# Patient Record
Sex: FEMALE | Race: WHITE | HISPANIC OR LATINO | Employment: FULL TIME | URBAN - METROPOLITAN AREA
[De-identification: names, ages, dates, MRNs, and addresses within clinical notes are randomized per-mention and may not be internally consistent; named-entity substitution may affect disease eponyms.]

---

## 2018-01-12 ENCOUNTER — OFFICE VISIT (OUTPATIENT)
Dept: URGENT CARE | Facility: CLINIC | Age: 53
End: 2018-01-12
Payer: COMMERCIAL

## 2018-01-12 VITALS
HEIGHT: 66 IN | TEMPERATURE: 98 F | OXYGEN SATURATION: 98 % | BODY MASS INDEX: 24.27 KG/M2 | RESPIRATION RATE: 18 BRPM | HEART RATE: 82 BPM | SYSTOLIC BLOOD PRESSURE: 134 MMHG | DIASTOLIC BLOOD PRESSURE: 64 MMHG | WEIGHT: 151 LBS

## 2018-01-12 DIAGNOSIS — R68.89 FLU-LIKE SYMPTOMS: ICD-10-CM

## 2018-01-12 DIAGNOSIS — J10.1 INFLUENZA A: Primary | ICD-10-CM

## 2018-01-12 LAB
CTP QC/QA: YES
FLUAV AG NPH QL: POSITIVE
FLUBV AG NPH QL: NEGATIVE

## 2018-01-12 PROCEDURE — 87804 INFLUENZA ASSAY W/OPTIC: CPT | Mod: 59,QW,S$GLB, | Performed by: NURSE PRACTITIONER

## 2018-01-12 PROCEDURE — 99203 OFFICE O/P NEW LOW 30 MIN: CPT | Mod: S$GLB,,, | Performed by: NURSE PRACTITIONER

## 2018-01-12 RX ORDER — ONDANSETRON 4 MG/1
4 TABLET, ORALLY DISINTEGRATING ORAL EVERY 8 HOURS PRN
Qty: 9 TABLET | Refills: 0 | Status: SHIPPED | OUTPATIENT
Start: 2018-01-12 | End: 2018-01-15

## 2018-01-12 RX ORDER — OSELTAMIVIR PHOSPHATE 75 MG/1
75 CAPSULE ORAL 2 TIMES DAILY
Qty: 10 CAPSULE | Refills: 0 | Status: SHIPPED | OUTPATIENT
Start: 2018-01-12 | End: 2018-01-17

## 2018-01-12 NOTE — LETTER
January 12, 2018      [facility]  [address] [place] [address]  Phone: [phone]  Fax: [phone]       Patient: Halle Spence   YOB: 1965  Date of Visit: 01/12/2018    To Whom It May Concern:    Gabriel Spence  was at [facility] on 01/12/2018. She may return to work/school on 1/16/18 with no restrictions. If you have any questions or concerns, or if I can be of further assistance, please do not hesitate to contact me.    Sincerely,    Halle English NP

## 2018-01-12 NOTE — PATIENT INSTRUCTIONS
Please drink plenty of fluids.  Please get plenty of rest.  Please return here or go to the Emergency Department for any concerns or worsening of condition.  Tamiflu prescription has been discussed and if prescribed, please take to completion unless you cannot tolerate the side effects.   If you were prescribed a narcotic medication, do not drive or operate heavy equipment or machinery while taking these medications.  If you were given a steroid shot in the clinic and have also been given a prescription for a steroid such as Prednisone or a Medrol Dose Pack, please begin taking them tomorrow.  If you do not have Hypertension or any history of palpitations, it is ok to take over the counter Sudafed or Mucinex D or Allegra-D or Claritin-D or Zyrtec-D.  If you do take one of the above, it is ok to combine that with plain over the counter Mucinex or Allegra or Claritin or Zyrtec.  If for example you are taking Zyrtec -D, you can combine that with Mucinex, but not Mucinex-D.  If you are taking Mucinex-D, you can combine that with plain Allegra or Claritin or Zyrtec.   If you do have Hypertension or palpitations, it is safe to take Coricidin HBP for relief of sinus symptoms.  If not allergic, please take over the counter Tylenol (Acetaminophen) and/or Motrin (Ibuprofen) as directed for control of pain and/or fever.  Please follow up with your primary care doctor or specialist as needed.    If you  smoke, please stop smoking.  The Flu (Influenza)     The virus that causes the flu spreads through the air in droplets when someone who has the flu coughs, sneezes, laughs, or talks.   The flu (influenza) is an infection that affects your respiratory tract. This tract is made up of your mouth, nose, and lungs, and the passages between them. Unlike a cold, the flu can make you very ill. And it can lead to pneumonia, a serious lung infection. The flu can have serious complications and even cause death.  Who is at risk for the  flu?  Anyone can get the flu. But you are more likely to become infected if you:  · Have a weakened immune system  · Work in a healthcare setting where you may be exposed to flu germs  · Live or work with someone who has the flu  · Havent had an annual flu shot  How does the flu spread?  The flu is caused by a virus. The virus spreads through the air in droplets when someone who has the flu coughs, sneezes, laughs, or talks. You can become infected when you inhale these viruses directly. You can also become infected when you touch a surface on which the droplets have landed and then transfer the germs to your eyes, nose, or mouth. Touching used tissues, or sharing utensils, drinking glasses, or a toothbrush from an infected person can expose you to flu viruses, too.  What are the symptoms of the flu?  Flu symptoms tend to come on quickly and may last a few days to a few weeks. They include:  · Fever usually higher than 100.4°F  (38°C) and chills  · Sore throat and headache  · Dry cough  · Runny nose  · Tiredness and weakness  · Muscle aches  Who is at risk for flu complications?  For some people, the flu can be very serious. The risk for complications is greater for:  · Children younger than age 5  · Adults ages 65 and older  · People with a chronic illness such as diabetes or heart, kidney, or lung disease  · People who live in a nursing home or long-term care facility   How is the flu treated?  The flu usually gets better after 7 days or so. In some cases, your healthcare provider may prescribe an antiviral medicine. This may help you get well a little sooner. For the medicine to help, you need to take it as soon as possible (ideally within 48 hours) after your symptoms start. If you develop pneumonia or other serious illness, you may need to stay in the hospital.  Easing flu symptoms  · Drink lots of fluids such as water, juice, and warm soup. A good rule is to drink enough so that you urinate your normal  amount.  · Get plenty of rest.  · Ask your healthcare provider what to take for fever and pain.  · Call your provider if your fever is 100.4°F (38°C) or higher, or you become dizzy, lightheaded, or short of breath.  Taking steps to protect others  · Wash your hands often, especially after coughing or sneezing. Or clean your hands with an alcohol-based hand  containing at least 60% alcohol.  · Cough or sneeze into a tissue. Then throw the tissue away and wash your hands. If you dont have a tissue, cough and sneeze into your elbow.  · Stay home until at least 24 hours after you no longer have a fever or chills. Be sure the fever isnt being hidden by fever-reducing medicine.  · Dont share food, utensils, drinking glasses, or a toothbrush with others.  · Ask your healthcare provider if others in your household should get antiviral medicine to help them avoid infection.  How can the flu be prevented?  · One of the best ways to avoid the flu is to get a flu vaccine each year. The virus that causes the flu changes from year to year. For that reason, healthcare providers recommend getting the flu vaccine each year, as soon as it's available in your area. The vaccine is given as a shot. Your healthcare provider can tell you which vaccine is right for you. A nasal spray is also available but is not recommended for the 4990-5058 flu season. The CDC says this is because the nasal spray did not seem to protect against the flu over the last several flu seasons. In the past, it was meant for people ages 2 to 49.  · Wash your hands often. Frequent handwashing is a proven way to help prevent infection.  · Carry an alcohol-based hand gel containing at least 60% alcohol. Use it when you can't use soap and water. Then wash your hands as soon as you can.  · Avoid touching your eyes, nose, and mouth.  · At home and work, clean phones, computer keyboards, and toys often with disinfectant wipes.  · If possible, avoid close  contact with others who have the flu or symptoms of the flu.  Handwashing tips  Handwashing is one of the best ways to prevent many common infections. If you are caring for or visiting someone with the flu, wash your hands each time you enter and leave the room. Follow these steps:  · Use warm water and plenty of soap. Rub your hands together well.  · Clean the whole hand, including under your nails, between your fingers, and up the wrists.  · Wash for at least 15 seconds.  · Rinse, letting the water run down your fingers, not up your wrists.  · Dry your hands well. Use a paper towel to turn off the faucet and open the door.  Using alcohol-based hand   Alcohol-based hand  are also a good choice. Use them when you can't use soap and water. Follow these steps:  · Squeeze about a tablespoon of gel into the palm of one hand.  · Rub your hands together briskly, cleaning the backs of your hands, the palms, between your fingers, and up the wrists.  · Rub until the gel is gone and your hands are completely dry.  Preventing the flu in healthcare settings  The flu is a special concern for people in hospitals and long-term care facilities. To help prevent the spread of flu, many hospitals and nursing homes take these steps:  · Healthcare providers wash their hands or use an alcohol-based hand  before and after treating each patient.  · People with the flu have private rooms and bathrooms or share a room with someone with the same infection.  · People who are at high risk for the flu but don't have it are encouraged to get the flu and pneumonia vaccines.  · All healthcare workers are encouraged or required to get flu shots.   Date Last Reviewed: 12/1/2016  © 6263-1929 [employer]. [address] [place] [address], [place] [address]. All rights reserved. This information is not intended as a substitute for professional medical care. Always follow your healthcare professional's instructions.

## 2018-01-12 NOTE — PROGRESS NOTES
"Subjective:       Patient ID: Halle Spence is a 52 y.o. female.    Vitals:  height is 5' 6" (1.676 m) and weight is 68.5 kg (151 lb). Her temperature is 98 °F (36.7 °C). Her blood pressure is 134/64 and her pulse is 82. Her respiration is 18 and oxygen saturation is 98%.     Chief Complaint: Sinus Problem    PATIENT REPORTS TO CLINIC WITH C/O VOMITING AND DIARRHEA ON  1/10/18. SHE STATES THAT THE VOMITING SUBSIDED THE NEXT DAY AND DIARRHEA STOPPED TODAY. SHE STATES THAT SHE TRIED TO GO TO WORK TODAY BUT DID NOT FEEL WELL. SHE HAS STARTED HAVING SINUS CONGESTION THIS MORNING. SHE STILL HAS SOME NAUSEA.       Sinus Problem   This is a new problem. Episode onset: 3 Days. The problem has been gradually worsening since onset. There has been no fever. Her pain is at a severity of 4/10. The pain is mild. Associated symptoms include chills and congestion. Pertinent negatives include no coughing, ear pain, headaches, hoarse voice, shortness of breath or sore throat. Past treatments include nothing.     Review of Systems   Constitution: Positive for chills and malaise/fatigue. Negative for fever.   HENT: Positive for congestion. Negative for ear pain, hoarse voice and sore throat.    Eyes: Negative for discharge and redness.   Cardiovascular: Negative for chest pain, dyspnea on exertion and leg swelling.   Respiratory: Negative for cough, shortness of breath, sputum production and wheezing.    Musculoskeletal: Negative for myalgias.   Gastrointestinal: Positive for diarrhea, nausea and vomiting. Negative for abdominal pain.   Neurological: Negative for headaches.       Objective:      Physical Exam   Constitutional: She is oriented to person, place, and time. She appears well-developed and well-nourished. She is cooperative.  Non-toxic appearance. She does not appear ill. No distress.   HENT:   Head: Normocephalic and atraumatic.   Right Ear: Hearing, tympanic membrane, external ear and ear canal normal.   Left Ear: " Hearing, tympanic membrane, external ear and ear canal normal.   Nose: Nose normal. No mucosal edema, rhinorrhea or nasal deformity. No epistaxis. Right sinus exhibits no maxillary sinus tenderness and no frontal sinus tenderness. Left sinus exhibits no maxillary sinus tenderness and no frontal sinus tenderness.   Mouth/Throat: Uvula is midline, oropharynx is clear and moist and mucous membranes are normal. No trismus in the jaw. Normal dentition. No uvula swelling. No posterior oropharyngeal erythema.   Eyes: Conjunctivae and lids are normal. No scleral icterus.   Sclera clear bilat   Neck: Trachea normal, full passive range of motion without pain and phonation normal. Neck supple.   Cardiovascular: Normal rate, regular rhythm, normal heart sounds, intact distal pulses and normal pulses.    Pulmonary/Chest: Effort normal and breath sounds normal. No respiratory distress.   Abdominal: Soft. Normal appearance and bowel sounds are normal. She exhibits no distension. There is no tenderness.   Musculoskeletal: Normal range of motion. She exhibits no edema or deformity.   Neurological: She is alert and oriented to person, place, and time. She exhibits normal muscle tone. Coordination normal.   Skin: Skin is warm, dry and intact. She is not diaphoretic. No pallor.   Psychiatric: She has a normal mood and affect. Her speech is normal and behavior is normal. Judgment and thought content normal. Cognition and memory are normal.   Nursing note and vitals reviewed.      Office Visit on 01/12/2018   Component Date Value Ref Range Status    Rapid Influenza A Ag 01/12/2018 Positive* Negative Final    Rapid Influenza B Ag 01/12/2018 Negative  Negative Final     Acceptable 01/12/2018 Yes   Final     Assessment:       1. Influenza A    2. Flu-like symptoms        Plan:         Influenza A  -     oseltamivir (TAMIFLU) 75 MG capsule; Take 1 capsule (75 mg total) by mouth 2 (two) times daily.  Dispense: 10 capsule;  Refill: 0  -     ondansetron (ZOFRAN-ODT) 4 MG TbDL; Take 1 tablet (4 mg total) by mouth every 8 (eight) hours as needed (NAUSEA).  Dispense: 9 tablet; Refill: 0    Flu-like symptoms  -     POCT Influenza A/B      Patient Instructions     Please drink plenty of fluids.  Please get plenty of rest.  Please return here or go to the Emergency Department for any concerns or worsening of condition.  Tamiflu prescription has been discussed and if prescribed, please take to completion unless you cannot tolerate the side effects.   If you were prescribed a narcotic medication, do not drive or operate heavy equipment or machinery while taking these medications.  If you were given a steroid shot in the clinic and have also been given a prescription for a steroid such as Prednisone or a Medrol Dose Pack, please begin taking them tomorrow.  If you do not have Hypertension or any history of palpitations, it is ok to take over the counter Sudafed or Mucinex D or Allegra-D or Claritin-D or Zyrtec-D.  If you do take one of the above, it is ok to combine that with plain over the counter Mucinex or Allegra or Claritin or Zyrtec.  If for example you are taking Zyrtec -D, you can combine that with Mucinex, but not Mucinex-D.  If you are taking Mucinex-D, you can combine that with plain Allegra or Claritin or Zyrtec.   If you do have Hypertension or palpitations, it is safe to take Coricidin HBP for relief of sinus symptoms.  If not allergic, please take over the counter Tylenol (Acetaminophen) and/or Motrin (Ibuprofen) as directed for control of pain and/or fever.  Please follow up with your primary care doctor or specialist as needed.    If you  smoke, please stop smoking.  The Flu (Influenza)     The virus that causes the flu spreads through the air in droplets when someone who has the flu coughs, sneezes, laughs, or talks.   The flu (influenza) is an infection that affects your respiratory tract. This tract is made up of your mouth,  nose, and lungs, and the passages between them. Unlike a cold, the flu can make you very ill. And it can lead to pneumonia, a serious lung infection. The flu can have serious complications and even cause death.  Who is at risk for the flu?  Anyone can get the flu. But you are more likely to become infected if you:  · Have a weakened immune system  · Work in a healthcare setting where you may be exposed to flu germs  · Live or work with someone who has the flu  · Havent had an annual flu shot  How does the flu spread?  The flu is caused by a virus. The virus spreads through the air in droplets when someone who has the flu coughs, sneezes, laughs, or talks. You can become infected when you inhale these viruses directly. You can also become infected when you touch a surface on which the droplets have landed and then transfer the germs to your eyes, nose, or mouth. Touching used tissues, or sharing utensils, drinking glasses, or a toothbrush from an infected person can expose you to flu viruses, too.  What are the symptoms of the flu?  Flu symptoms tend to come on quickly and may last a few days to a few weeks. They include:  · Fever usually higher than 100.4°F  (38°C) and chills  · Sore throat and headache  · Dry cough  · Runny nose  · Tiredness and weakness  · Muscle aches  Who is at risk for flu complications?  For some people, the flu can be very serious. The risk for complications is greater for:  · Children younger than age 5  · Adults ages 65 and older  · People with a chronic illness such as diabetes or heart, kidney, or lung disease  · People who live in a nursing home or long-term care facility   How is the flu treated?  The flu usually gets better after 7 days or so. In some cases, your healthcare provider may prescribe an antiviral medicine. This may help you get well a little sooner. For the medicine to help, you need to take it as soon as possible (ideally within 48 hours) after your symptoms start. If  you develop pneumonia or other serious illness, you may need to stay in the hospital.  Easing flu symptoms  · Drink lots of fluids such as water, juice, and warm soup. A good rule is to drink enough so that you urinate your normal amount.  · Get plenty of rest.  · Ask your healthcare provider what to take for fever and pain.  · Call your provider if your fever is 100.4°F (38°C) or higher, or you become dizzy, lightheaded, or short of breath.  Taking steps to protect others  · Wash your hands often, especially after coughing or sneezing. Or clean your hands with an alcohol-based hand  containing at least 60% alcohol.  · Cough or sneeze into a tissue. Then throw the tissue away and wash your hands. If you dont have a tissue, cough and sneeze into your elbow.  · Stay home until at least 24 hours after you no longer have a fever or chills. Be sure the fever isnt being hidden by fever-reducing medicine.  · Dont share food, utensils, drinking glasses, or a toothbrush with others.  · Ask your healthcare provider if others in your household should get antiviral medicine to help them avoid infection.  How can the flu be prevented?  · One of the best ways to avoid the flu is to get a flu vaccine each year. The virus that causes the flu changes from year to year. For that reason, healthcare providers recommend getting the flu vaccine each year, as soon as it's available in your area. The vaccine is given as a shot. Your healthcare provider can tell you which vaccine is right for you. A nasal spray is also available but is not recommended for the 5307-9942 flu season. The CDC says this is because the nasal spray did not seem to protect against the flu over the last several flu seasons. In the past, it was meant for people ages 2 to 49.  · Wash your hands often. Frequent handwashing is a proven way to help prevent infection.  · Carry an alcohol-based hand gel containing at least 60% alcohol. Use it when you can't use  soap and water. Then wash your hands as soon as you can.  · Avoid touching your eyes, nose, and mouth.  · At home and work, clean phones, computer keyboards, and toys often with disinfectant wipes.  · If possible, avoid close contact with others who have the flu or symptoms of the flu.  Handwashing tips  Handwashing is one of the best ways to prevent many common infections. If you are caring for or visiting someone with the flu, wash your hands each time you enter and leave the room. Follow these steps:  · Use warm water and plenty of soap. Rub your hands together well.  · Clean the whole hand, including under your nails, between your fingers, and up the wrists.  · Wash for at least 15 seconds.  · Rinse, letting the water run down your fingers, not up your wrists.  · Dry your hands well. Use a paper towel to turn off the faucet and open the door.  Using alcohol-based hand   Alcohol-based hand  are also a good choice. Use them when you can't use soap and water. Follow these steps:  · Squeeze about a tablespoon of gel into the palm of one hand.  · Rub your hands together briskly, cleaning the backs of your hands, the palms, between your fingers, and up the wrists.  · Rub until the gel is gone and your hands are completely dry.  Preventing the flu in healthcare settings  The flu is a special concern for people in hospitals and long-term care facilities. To help prevent the spread of flu, many hospitals and nursing homes take these steps:  · Healthcare providers wash their hands or use an alcohol-based hand  before and after treating each patient.  · People with the flu have private rooms and bathrooms or share a room with someone with the same infection.  · People who are at high risk for the flu but don't have it are encouraged to get the flu and pneumonia vaccines.  · All healthcare workers are encouraged or required to get flu shots.   Date Last Reviewed: 12/1/2016  © 8963-6743 [email or web address] [address] [place] [address], [place] [address]. All rights reserved. This information is not intended as a substitute for professional medical care. Always follow your healthcare professional's instructions.

## 2019-02-15 ENCOUNTER — OFFICE VISIT (OUTPATIENT)
Dept: URGENT CARE | Facility: CLINIC | Age: 54
End: 2019-02-15
Payer: COMMERCIAL

## 2019-02-15 VITALS
WEIGHT: 151 LBS | DIASTOLIC BLOOD PRESSURE: 67 MMHG | HEART RATE: 70 BPM | RESPIRATION RATE: 18 BRPM | OXYGEN SATURATION: 97 % | BODY MASS INDEX: 25.16 KG/M2 | SYSTOLIC BLOOD PRESSURE: 134 MMHG | HEIGHT: 65 IN

## 2019-02-15 DIAGNOSIS — R30.0 DYSURIA: Primary | ICD-10-CM

## 2019-02-15 DIAGNOSIS — R10.84 GENERALIZED ABDOMINAL PAIN: ICD-10-CM

## 2019-02-15 LAB
BILIRUB UR QL STRIP: NEGATIVE
GLUCOSE UR QL STRIP: POSITIVE
KETONES UR QL STRIP: NEGATIVE
LEUKOCYTE ESTERASE UR QL STRIP: NEGATIVE
PH, POC UA: 5.5 (ref 5–8)
POC BLOOD, URINE: POSITIVE
POC NITRATES, URINE: NEGATIVE
PROT UR QL STRIP: NEGATIVE
SP GR UR STRIP: 1.02 (ref 1–1.03)
UROBILINOGEN UR STRIP-ACNC: NORMAL (ref 0.1–1.1)

## 2019-02-15 PROCEDURE — 99214 PR OFFICE/OUTPT VISIT, EST, LEVL IV, 30-39 MIN: ICD-10-PCS | Mod: 25,S$GLB,, | Performed by: NURSE PRACTITIONER

## 2019-02-15 PROCEDURE — 3008F PR BODY MASS INDEX (BMI) DOCUMENTED: ICD-10-PCS | Mod: CPTII,S$GLB,, | Performed by: NURSE PRACTITIONER

## 2019-02-15 PROCEDURE — 81003 POCT URINALYSIS, DIPSTICK, AUTOMATED, W/O SCOPE: ICD-10-PCS | Mod: QW,S$GLB,, | Performed by: NURSE PRACTITIONER

## 2019-02-15 PROCEDURE — 3008F BODY MASS INDEX DOCD: CPT | Mod: CPTII,S$GLB,, | Performed by: NURSE PRACTITIONER

## 2019-02-15 PROCEDURE — 99214 OFFICE O/P EST MOD 30 MIN: CPT | Mod: 25,S$GLB,, | Performed by: NURSE PRACTITIONER

## 2019-02-15 PROCEDURE — 81003 URINALYSIS AUTO W/O SCOPE: CPT | Mod: QW,S$GLB,, | Performed by: NURSE PRACTITIONER

## 2019-02-15 RX ORDER — CANAGLIFLOZIN 100 MG/1
TABLET, FILM COATED ORAL
Refills: 10 | Status: ON HOLD | COMMUNITY
Start: 2019-01-24 | End: 2020-04-02 | Stop reason: HOSPADM

## 2019-02-15 RX ORDER — NITROFURANTOIN 25; 75 MG/1; MG/1
100 CAPSULE ORAL 2 TIMES DAILY
Qty: 14 CAPSULE | Refills: 0 | Status: SHIPPED | OUTPATIENT
Start: 2019-02-15 | End: 2019-02-22

## 2019-02-15 RX ORDER — INSULIN LISPRO 100 [IU]/ML
INJECTION, SOLUTION INTRAVENOUS; SUBCUTANEOUS
Refills: 4 | COMMUNITY
Start: 2019-01-28 | End: 2022-12-05

## 2019-02-15 RX ORDER — LEVOTHYROXINE SODIUM 88 UG/1
TABLET ORAL
Refills: 9 | COMMUNITY
Start: 2019-01-28 | End: 2019-11-25 | Stop reason: SDUPTHER

## 2019-02-15 NOTE — PROGRESS NOTES
"Subjective:       Patient ID: Halle Spence is a 53 y.o. female.    Vitals:  height is 5' 5" (1.651 m) and weight is 68.5 kg (151 lb). Her blood pressure is 134/67 and her pulse is 70. Her respiration is 18 and oxygen saturation is 97%.     Chief Complaint: Urinary Tract Infection    Urinary Tract Infection    This is a new problem. Episode onset: 3 days. The problem has been unchanged. Quality: just pressure  The pain is at a severity of 5/10. The pain is moderate. There has been no fever. She is sexually active. There is no history of pyelonephritis. Pertinent negatives include no chills, frequency, hematuria, nausea, urgency, vomiting or rash. Treatments tried: ibuprofen. The treatment provided no relief.       Constitution: Negative for chills and fever.   Neck: Negative for painful lymph nodes.   Gastrointestinal: Positive for abdominal pain. Negative for nausea and vomiting.   Genitourinary: Negative for dysuria, frequency, urgency, urine decreased, hematuria, history of kidney stones, painful menstruation, irregular menstruation, missed menses, heavy menstrual bleeding, ovarian cysts, genital trauma, vaginal pain, vaginal discharge, vaginal bleeding, vaginal odor, painful intercourse, genital sore, painful ejaculation and pelvic pain.   Musculoskeletal: Positive for back pain.   Skin: Negative for rash and lesion.   Hematologic/Lymphatic: Negative for swollen lymph nodes.       Objective:      Physical Exam   Constitutional: She is oriented to person, place, and time. She appears well-developed and well-nourished. She is cooperative.  Non-toxic appearance. She does not appear ill. No distress.   HENT:   Head: Normocephalic and atraumatic.   Right Ear: Hearing, tympanic membrane, external ear and ear canal normal.   Left Ear: Hearing, tympanic membrane, external ear and ear canal normal.   Nose: Nose normal. No mucosal edema, rhinorrhea or nasal deformity. No epistaxis. Right sinus exhibits no maxillary " sinus tenderness and no frontal sinus tenderness. Left sinus exhibits no maxillary sinus tenderness and no frontal sinus tenderness.   Mouth/Throat: Uvula is midline, oropharynx is clear and moist and mucous membranes are normal. No trismus in the jaw. Normal dentition. No uvula swelling. No posterior oropharyngeal erythema.   Eyes: Conjunctivae and lids are normal. Right eye exhibits no discharge. Left eye exhibits no discharge. No scleral icterus.   Sclera clear bilat   Neck: Trachea normal, normal range of motion, full passive range of motion without pain and phonation normal. Neck supple.   Cardiovascular: Normal rate, regular rhythm, normal heart sounds, intact distal pulses and normal pulses.   Pulmonary/Chest: Effort normal and breath sounds normal. No respiratory distress.   Abdominal: Soft. Normal appearance and bowel sounds are normal. She exhibits no distension, no pulsatile midline mass and no mass. There is tenderness in the suprapubic area.       Musculoskeletal: Normal range of motion. She exhibits no edema or deformity.   Neurological: She is alert and oriented to person, place, and time. She exhibits normal muscle tone. Coordination normal.   Skin: Skin is warm, dry and intact. She is not diaphoretic. No pallor.   Psychiatric: She has a normal mood and affect. Her speech is normal and behavior is normal. Judgment and thought content normal. Cognition and memory are normal.   Nursing note and vitals reviewed.      Results for orders placed or performed in visit on 02/15/19   POCT Urinalysis, Dipstick, Automated, W/O Scope   Result Value Ref Range    POC Blood, Urine Positive (A) Negative    POC Bilirubin, Urine Negative Negative    POC Urobilinogen, Urine normal 0.1 - 1.1    POC Ketones, Urine Negative Negative    POC Protein, Urine Negative Negative    POC Nitrates, Urine Negative Negative    POC Glucose, Urine Positive (A) Negative    pH, UA 5.5 5 - 8    POC Specific Gravity, Urine 1.020 1.003 -  1.029    POC Leukocytes, Urine Negative Negative     Assessment:       1. Dysuria    2. Generalized abdominal pain        Plan:       Patient urine negative for leukocytes and nitrates.  Urine culture sent off.  Patient treated symptomatically with Macrobid.  Dysuria  -     Culture, Urine    Generalized abdominal pain  -     POCT Urinalysis, Dipstick, Automated, W/O Scope      Patient Instructions     Patient given antibiotics treating symptomatically. Patient educated and will come back approximately 5 days.    Please return here or go to the Emergency Department for any concerns or worsening of condition.  If you were prescribed antibiotics, please take them to completion.  Please follow up with your primary care doctor or specialist as needed.  Please drink plenty of fluids.  Please get plenty of rest.  I  Push fluids aggressively to improve symptoms and wash through the infection.  Cranberry juice can help relieve symptoms  If you  smoke, please stop smoking.    Please follow up with your primary care doctor or specialist as needed.    Adolfo Osborne MD  [phone]        Dysuria     Painful urination (dysuria) is often caused by a problem in the urinary tract.   Dysuria is pain felt during urination. It is often described as a burning. Learn more about this problem and how it can be treated.  What causes dysuria?  Possible causes include:  · Infection with a bacteria or virus such as a urinary tract infection (UTI or a sexually transmitted infection (STI)  · Sensitivity or allergy to chemicals such as those found in lotions and other products  · Prostate or bladder problems  · Radiation therapy to the pelvic area  How is dysuria diagnosed?  Your healthcare provider will examine you. He or she will ask about your symptoms and health. After talking with you and doing a physical exam, your healthcare provider may know what is causing your dysuria. He or she will usually request  a sample of your urine. Tests  "of your urine, or a "urinalysis," are done. A urinalysis may include:  · Looking at the urine sample (visual exam)  · Checking for substances (chemical exam)  · Looking at a small amount under a microscope (microscopic exam)  Some parts of the urinalysis may be done in the provider's office and some in a lab. And, the urine sample may be checked for bacteria and yeast (urine culture). Your healthcare provider will tell you more about these tests if they are needed.  How is dysuria treated?  Treatment depends on the cause. If you have a bacterial infection, you may need antibiotics. You may be given medicines to make it easier for you to urinate and help relieve pain. Your healthcare provider can tell you more about your treatment options. Untreated, symptoms may get worse.  When to call your healthcare provider  Call the healthcare provider right away if you have any of the following:  · Fever of 100.4°F (38°C) or higher   · No improvement after three days of treatment  · Trouble urinating because of pain  · New or increased discharge from the vagina or penis  · Rash or joint pain  · Increased back or abdominal pain  · Enlarged painful lymph nodes (lumps) in the groin   Date Last Reviewed: 1/1/2017  © 1874-5046 [employer]. [address] [place] [address], [place] [address]. All rights reserved. This information is not intended as a substitute for professional medical care. Always follow your healthcare professional's instructions.             "

## 2019-02-15 NOTE — PATIENT INSTRUCTIONS
"Patient given antibiotics treating symptomatically. Patient educated and will come back approximately 5 days.    Please return here or go to the Emergency Department for any concerns or worsening of condition.  If you were prescribed antibiotics, please take them to completion.  Please follow up with your primary care doctor or specialist as needed.  Please drink plenty of fluids.  Please get plenty of rest.  I  Push fluids aggressively to improve symptoms and wash through the infection.  Cranberry juice can help relieve symptoms  If you  smoke, please stop smoking.    Please follow up with your primary care doctor or specialist as needed.    Adolfo Osborne MD  [phone]        Dysuria     Painful urination (dysuria) is often caused by a problem in the urinary tract.   Dysuria is pain felt during urination. It is often described as a burning. Learn more about this problem and how it can be treated.  What causes dysuria?  Possible causes include:  · Infection with a bacteria or virus such as a urinary tract infection (UTI or a sexually transmitted infection (STI)  · Sensitivity or allergy to chemicals such as those found in lotions and other products  · Prostate or bladder problems  · Radiation therapy to the pelvic area  How is dysuria diagnosed?  Your healthcare provider will examine you. He or she will ask about your symptoms and health. After talking with you and doing a physical exam, your healthcare provider may know what is causing your dysuria. He or she will usually request  a sample of your urine. Tests of your urine, or a "urinalysis," are done. A urinalysis may include:  · Looking at the urine sample (visual exam)  · Checking for substances (chemical exam)  · Looking at a small amount under a microscope (microscopic exam)  Some parts of the urinalysis may be done in the provider's office and some in a lab. And, the urine sample may be checked for bacteria and yeast (urine culture). Your healthcare " provider will tell you more about these tests if they are needed.  How is dysuria treated?  Treatment depends on the cause. If you have a bacterial infection, you may need antibiotics. You may be given medicines to make it easier for you to urinate and help relieve pain. Your healthcare provider can tell you more about your treatment options. Untreated, symptoms may get worse.  When to call your healthcare provider  Call the healthcare provider right away if you have any of the following:  · Fever of 100.4°F (38°C) or higher   · No improvement after three days of treatment  · Trouble urinating because of pain  · New or increased discharge from the vagina or penis  · Rash or joint pain  · Increased back or abdominal pain  · Enlarged painful lymph nodes (lumps) in the groin   Date Last Reviewed: 1/1/2017  © 0295-7333 [employer]. [address] [place] [address], [place] [address]. All rights reserved. This information is not intended as a substitute for professional medical care. Always follow your healthcare professional's instructions.

## 2019-02-18 LAB
BACTERIA UR CULT: NORMAL
BACTERIA UR CULT: NORMAL

## 2019-02-20 ENCOUNTER — TELEPHONE (OUTPATIENT)
Dept: URGENT CARE | Facility: CLINIC | Age: 54
End: 2019-02-20

## 2019-02-20 NOTE — TELEPHONE ENCOUNTER
Called patient, no answer, could not leave a message. I was calling to inform patient of her negative urine culture results.

## 2019-02-20 NOTE — TELEPHONE ENCOUNTER
----- Message from Laura Dubon PA-C sent at 2/20/2019  8:55 AM CST -----  Please call the patient regarding her normal urine culture results.

## 2019-02-27 ENCOUNTER — TELEPHONE (OUTPATIENT)
Dept: URGENT CARE | Facility: CLINIC | Age: 54
End: 2019-02-27

## 2019-03-05 ENCOUNTER — TELEPHONE (OUTPATIENT)
Dept: URGENT CARE | Facility: CLINIC | Age: 54
End: 2019-03-05

## 2019-03-05 NOTE — TELEPHONE ENCOUNTER
Called patient, no answer, could not leave a message. I was calling to inform patient of her results.

## 2019-07-16 ENCOUNTER — OFFICE VISIT (OUTPATIENT)
Dept: URGENT CARE | Facility: CLINIC | Age: 54
End: 2019-07-16
Payer: COMMERCIAL

## 2019-07-16 VITALS
DIASTOLIC BLOOD PRESSURE: 69 MMHG | TEMPERATURE: 98 F | BODY MASS INDEX: 25.83 KG/M2 | OXYGEN SATURATION: 98 % | HEIGHT: 65 IN | WEIGHT: 155 LBS | HEART RATE: 72 BPM | RESPIRATION RATE: 18 BRPM | SYSTOLIC BLOOD PRESSURE: 125 MMHG

## 2019-07-16 DIAGNOSIS — J06.9 URI, ACUTE: ICD-10-CM

## 2019-07-16 DIAGNOSIS — J02.9 ACUTE PHARYNGITIS, UNSPECIFIED ETIOLOGY: Primary | ICD-10-CM

## 2019-07-16 DIAGNOSIS — J30.1 SEASONAL ALLERGIC RHINITIS DUE TO POLLEN: ICD-10-CM

## 2019-07-16 LAB
CTP QC/QA: YES
S PYO RRNA THROAT QL PROBE: NEGATIVE

## 2019-07-16 PROCEDURE — 99213 PR OFFICE/OUTPT VISIT, EST, LEVL III, 20-29 MIN: ICD-10-PCS | Mod: S$GLB,,, | Performed by: FAMILY MEDICINE

## 2019-07-16 PROCEDURE — 3008F BODY MASS INDEX DOCD: CPT | Mod: CPTII,S$GLB,, | Performed by: FAMILY MEDICINE

## 2019-07-16 PROCEDURE — 87880 STREP A ASSAY W/OPTIC: CPT | Mod: QW,S$GLB,, | Performed by: FAMILY MEDICINE

## 2019-07-16 PROCEDURE — 99213 OFFICE O/P EST LOW 20 MIN: CPT | Mod: S$GLB,,, | Performed by: FAMILY MEDICINE

## 2019-07-16 PROCEDURE — 87880 POCT RAPID STREP A: ICD-10-PCS | Mod: QW,S$GLB,, | Performed by: FAMILY MEDICINE

## 2019-07-16 PROCEDURE — 3008F PR BODY MASS INDEX (BMI) DOCUMENTED: ICD-10-PCS | Mod: CPTII,S$GLB,, | Performed by: FAMILY MEDICINE

## 2019-07-16 NOTE — PROGRESS NOTES
"Subjective:       Patient ID: Halle Spence is a 53 y.o. female.    Vitals:  height is 5' 5" (1.651 m) and weight is 70.3 kg (155 lb). Her temperature is 98.2 °F (36.8 °C). Her blood pressure is 125/69 and her pulse is 72. Her respiration is 18 and oxygen saturation is 98%.     Chief Complaint: Sore Throat    C/o sore throat, post nasal drip and mild cough x few days, now with earache    Sore Throat    This is a new problem. The current episode started yesterday. The problem has been gradually worsening. Neither side of throat is experiencing more pain than the other. There has been no fever. The pain is at a severity of 5/10. The pain is moderate. Associated symptoms include coughing, ear pain, headaches and a hoarse voice. Pertinent negatives include no abdominal pain, congestion, diarrhea, drooling, ear discharge, plugged ear sensation, neck pain, shortness of breath, stridor, swollen glands, trouble swallowing or vomiting. She has had no exposure to strep or mono. She has tried nothing for the symptoms.       Constitution: Positive for chills. Negative for sweating, fatigue and fever.   HENT: Positive for ear pain and sore throat. Negative for ear discharge, drooling, congestion, sinus pain, sinus pressure, trouble swallowing and voice change.    Neck: Negative for neck pain and painful lymph nodes.   Eyes: Negative for eye redness.   Respiratory: Positive for cough. Negative for chest tightness, sputum production, bloody sputum, COPD, shortness of breath, stridor, wheezing and asthma.    Gastrointestinal: Negative for abdominal pain, nausea, vomiting and diarrhea.   Musculoskeletal: Negative for muscle ache.   Skin: Negative for rash.   Allergic/Immunologic: Negative for seasonal allergies and asthma.   Neurological: Positive for headaches.   Hematologic/Lymphatic: Negative for swollen lymph nodes.       Objective:      Physical Exam   Constitutional: She is oriented to person, place, and time. She " appears well-developed and well-nourished. She is cooperative.  Non-toxic appearance. She does not appear ill. No distress.   HENT:   Head: Normocephalic and atraumatic.   Right Ear: Hearing, tympanic membrane, external ear and ear canal normal.   Left Ear: Hearing, tympanic membrane, external ear and ear canal normal.   Nose: Nose normal. No mucosal edema, rhinorrhea or nasal deformity. No epistaxis. Right sinus exhibits no maxillary sinus tenderness and no frontal sinus tenderness. Left sinus exhibits no maxillary sinus tenderness and no frontal sinus tenderness.   Mouth/Throat: Uvula is midline, oropharynx is clear and moist and mucous membranes are normal. No trismus in the jaw. Normal dentition. No uvula swelling. No posterior oropharyngeal erythema.   Erythematous posterior pharynx, no LAP  No neck rigidity   Eyes: Conjunctivae and lids are normal. Right eye exhibits no discharge. Left eye exhibits no discharge. No scleral icterus.   Sclera clear bilat   Neck: Trachea normal, normal range of motion, full passive range of motion without pain and phonation normal. Neck supple.   Cardiovascular: Normal rate, regular rhythm, normal heart sounds, intact distal pulses and normal pulses.   Pulmonary/Chest: Effort normal and breath sounds normal. No respiratory distress.   Abdominal: Soft. Normal appearance and bowel sounds are normal. She exhibits no distension, no pulsatile midline mass and no mass. There is no tenderness.   Musculoskeletal: Normal range of motion. She exhibits no edema or deformity.   Neurological: She is alert and oriented to person, place, and time. She exhibits normal muscle tone. Coordination normal.   Skin: Skin is warm, dry and intact. She is not diaphoretic. No pallor.   Psychiatric: She has a normal mood and affect. Her speech is normal and behavior is normal. Judgment and thought content normal. Cognition and memory are normal.   Nursing note and vitals reviewed.      Assessment:       1.  Acute pharyngitis, unspecified etiology    2. URI, acute    3. Seasonal allergic rhinitis due to pollen        Plan:         Acute pharyngitis, unspecified etiology  -     POCT rapid strep A    URI, acute    Seasonal allergic rhinitis due to pollen     Cont zyrtec D one bid    Mucinex DM one bid

## 2019-09-19 ENCOUNTER — OFFICE VISIT (OUTPATIENT)
Dept: URGENT CARE | Facility: CLINIC | Age: 54
End: 2019-09-19
Payer: COMMERCIAL

## 2019-09-19 VITALS
SYSTOLIC BLOOD PRESSURE: 130 MMHG | TEMPERATURE: 98 F | HEIGHT: 65 IN | HEART RATE: 63 BPM | RESPIRATION RATE: 16 BRPM | WEIGHT: 155 LBS | OXYGEN SATURATION: 98 % | BODY MASS INDEX: 25.83 KG/M2 | DIASTOLIC BLOOD PRESSURE: 72 MMHG

## 2019-09-19 DIAGNOSIS — H66.92 LEFT OTITIS MEDIA, UNSPECIFIED OTITIS MEDIA TYPE: Primary | ICD-10-CM

## 2019-09-19 DIAGNOSIS — R68.83 CHILLS: ICD-10-CM

## 2019-09-19 DIAGNOSIS — J32.9 BACTERIAL SINUSITIS: ICD-10-CM

## 2019-09-19 DIAGNOSIS — J02.9 SORE THROAT: ICD-10-CM

## 2019-09-19 DIAGNOSIS — B96.89 BACTERIAL SINUSITIS: ICD-10-CM

## 2019-09-19 DIAGNOSIS — R11.0 NAUSEA: ICD-10-CM

## 2019-09-19 LAB
CTP QC/QA: YES
CTP QC/QA: YES
FLUAV AG NPH QL: NEGATIVE
FLUBV AG NPH QL: NEGATIVE
S PYO RRNA THROAT QL PROBE: NEGATIVE

## 2019-09-19 PROCEDURE — 87804 POCT INFLUENZA A/B: ICD-10-PCS | Mod: 59,QW,S$GLB, | Performed by: PHYSICIAN ASSISTANT

## 2019-09-19 PROCEDURE — 99214 OFFICE O/P EST MOD 30 MIN: CPT | Mod: S$GLB,,, | Performed by: PHYSICIAN ASSISTANT

## 2019-09-19 PROCEDURE — 87880 STREP A ASSAY W/OPTIC: CPT | Mod: QW,S$GLB,, | Performed by: PHYSICIAN ASSISTANT

## 2019-09-19 PROCEDURE — 87804 INFLUENZA ASSAY W/OPTIC: CPT | Mod: QW,S$GLB,, | Performed by: PHYSICIAN ASSISTANT

## 2019-09-19 PROCEDURE — 99214 PR OFFICE/OUTPT VISIT, EST, LEVL IV, 30-39 MIN: ICD-10-PCS | Mod: S$GLB,,, | Performed by: PHYSICIAN ASSISTANT

## 2019-09-19 PROCEDURE — 87880 POCT RAPID STREP A: ICD-10-PCS | Mod: QW,S$GLB,, | Performed by: PHYSICIAN ASSISTANT

## 2019-09-19 PROCEDURE — 3008F PR BODY MASS INDEX (BMI) DOCUMENTED: ICD-10-PCS | Mod: CPTII,S$GLB,, | Performed by: PHYSICIAN ASSISTANT

## 2019-09-19 PROCEDURE — 3008F BODY MASS INDEX DOCD: CPT | Mod: CPTII,S$GLB,, | Performed by: PHYSICIAN ASSISTANT

## 2019-09-19 RX ORDER — AMOXICILLIN AND CLAVULANATE POTASSIUM 875; 125 MG/1; MG/1
1 TABLET, FILM COATED ORAL 2 TIMES DAILY
Qty: 20 TABLET | Refills: 0 | Status: SHIPPED | OUTPATIENT
Start: 2019-09-19 | End: 2019-09-29

## 2019-09-19 RX ORDER — ONDANSETRON 4 MG/1
4 TABLET, ORALLY DISINTEGRATING ORAL EVERY 8 HOURS PRN
Qty: 12 TABLET | Refills: 0 | Status: SHIPPED | OUTPATIENT
Start: 2019-09-19 | End: 2019-11-25

## 2019-09-19 RX ORDER — PREDNISONE 10 MG/1
20 TABLET ORAL DAILY
Qty: 6 TABLET | Refills: 0 | Status: SHIPPED | OUTPATIENT
Start: 2019-09-19 | End: 2019-09-22

## 2019-09-19 NOTE — PROGRESS NOTES
"Subjective:       Patient ID: Halle Spence is a 53 y.o. female.    Vitals:  height is 5' 5" (1.651 m) and weight is 70.3 kg (155 lb). Her temperature is 98 °F (36.7 °C). Her blood pressure is 130/72 and her pulse is 63. Her respiration is 16 and oxygen saturation is 98%.     Chief Complaint: Otalgia and Sore Throat    Otalgia     There is pain in the left ear. This is a new problem. The current episode started in the past 7 days. The problem occurs constantly. The problem has been gradually worsening. There has been no fever. The pain is at a severity of 5/10. The pain is mild. Associated symptoms include headaches and a sore throat. Pertinent negatives include no abdominal pain, coughing, diarrhea, ear discharge, hearing loss, neck pain, rash, rhinorrhea or vomiting. She has tried acetaminophen for the symptoms. The treatment provided no relief.    Sore Throat     This is a new problem. The current episode started in the past 7 days. The problem has been unchanged.  Neither side of throat is experiencing more pain than the other. There has been no fever. The pain is at a severity of 3/10. The pain is mild. Associated symptoms include congestion, ear pain (left) and headaches. Pertinent negatives include no abdominal pain, coughing, diarrhea, drooling, ear discharge, hoarse voice, plugged ear sensation, neck pain, shortness of breath, stridor, swollen glands, trouble swallowing or vomiting. She has had no exposure to strep or mono. She has tried acetaminophen for the symptoms. The treatment provided no relief.       Constitution: Positive for chills. Negative for sweating, fatigue and fever.   HENT: Positive for ear pain (left), congestion, postnasal drip, sinus pain, sinus pressure and sore throat. Negative for ear discharge, foreign body in ear, tinnitus, hearing loss, drooling, nosebleeds, foreign body in nose, trouble swallowing and voice change.    Neck: Negative for neck pain, neck stiffness, painful " lymph nodes and neck swelling.   Cardiovascular: Negative for chest pain, leg swelling, palpitations, sob on exertion and passing out.   Eyes: Negative for eye pain, eye redness, photophobia, double vision, blurred vision and eyelid swelling.   Respiratory: Negative for chest tightness, cough, sputum production, bloody sputum, shortness of breath, stridor and wheezing.    Gastrointestinal: Positive for nausea. Negative for abdominal pain, abdominal bloating, vomiting, constipation, diarrhea and heartburn.   Musculoskeletal: Negative for joint pain, joint swelling, muscle cramps and muscle ache.   Skin: Negative for rash and hives.   Allergic/Immunologic: Negative for seasonal allergies, hives, itching and sneezing.   Neurological: Positive for headaches. Negative for dizziness, light-headedness, passing out, loss of balance, altered mental status, loss of consciousness and seizures.   Hematologic/Lymphatic: Negative for swollen lymph nodes.   Psychiatric/Behavioral: Negative for altered mental status and nervous/anxious. The patient is not nervous/anxious.        Objective:      Physical Exam   Constitutional: She is oriented to person, place, and time. She appears well-developed and well-nourished. She is cooperative.  Non-toxic appearance. She does not appear ill. No distress.   HENT:   Head: Normocephalic and atraumatic.   Right Ear: Hearing, tympanic membrane, external ear and ear canal normal.   Left Ear: Hearing, external ear and ear canal normal. No lacerations. There is tenderness. No drainage or swelling. No foreign bodies. No mastoid tenderness. Tympanic membrane is injected and erythematous. Tympanic membrane is not scarred, not perforated, not retracted and not bulging. Tympanic membrane mobility is abnormal. A middle ear effusion is present. No hemotympanum. No decreased hearing is noted.   Nose: Mucosal edema and rhinorrhea present. No nasal deformity. No epistaxis. Right sinus exhibits maxillary sinus  tenderness and frontal sinus tenderness. Left sinus exhibits maxillary sinus tenderness and frontal sinus tenderness.   Mouth/Throat: Uvula is midline and mucous membranes are normal. No trismus in the jaw. Normal dentition. No uvula swelling. Posterior oropharyngeal erythema present. No oropharyngeal exudate or posterior oropharyngeal edema. No tonsillar exudate.   Eyes: Conjunctivae and lids are normal. No scleral icterus.   Sclera clear bilat   Neck: Trachea normal, normal range of motion, full passive range of motion without pain and phonation normal. Neck supple.   Cardiovascular: Normal rate, regular rhythm, normal heart sounds, intact distal pulses and normal pulses.   Pulmonary/Chest: Effort normal and breath sounds normal. No respiratory distress.   Abdominal: Soft. Normal appearance and bowel sounds are normal. She exhibits no distension. There is no tenderness. There is no rigidity, no rebound, no guarding, no CVA tenderness, no tenderness at McBurney's point and negative Galdamez's sign. No hernia.   Musculoskeletal: Normal range of motion. She exhibits no edema or deformity.   Lymphadenopathy:     She has no cervical adenopathy.   Neurological: She is alert and oriented to person, place, and time. She exhibits normal muscle tone. Coordination normal.   Skin: Skin is warm, dry and intact. Capillary refill takes less than 2 seconds. No rash noted. She is not diaphoretic. No pallor.   Psychiatric: She has a normal mood and affect. Her speech is normal and behavior is normal. Judgment and thought content normal. Cognition and memory are normal.   Nursing note and vitals reviewed.        Results for orders placed or performed in visit on 09/19/19   POCT rapid strep A   Result Value Ref Range    Rapid Strep A Screen Negative Negative     Acceptable Yes    POCT Influenza A/B   Result Value Ref Range    Rapid Influenza A Ag Negative Negative    Rapid Influenza B Ag Negative Negative    Quality  Control Acceptable Yes        Assessment:       1. Left otitis media, unspecified otitis media type    2. Bacterial sinusitis    3. Sore throat    4. Chills    5. Nausea        Plan:         Left otitis media, unspecified otitis media type  -     amoxicillin-clavulanate 875-125mg (AUGMENTIN) 875-125 mg per tablet; Take 1 tablet by mouth 2 (two) times daily. for 10 days  Dispense: 20 tablet; Refill: 0    Bacterial sinusitis  -     amoxicillin-clavulanate 875-125mg (AUGMENTIN) 875-125 mg per tablet; Take 1 tablet by mouth 2 (two) times daily. for 10 days  Dispense: 20 tablet; Refill: 0  -     predniSONE (DELTASONE) 10 MG tablet; Take 2 tablets (20 mg total) by mouth once daily. for 3 days  Dispense: 6 tablet; Refill: 0    Sore throat  -     POCT rapid strep A    Chills  -     POCT Influenza A/B    Nausea  -     ondansetron (ZOFRAN-ODT) 4 MG TbDL; Take 1 tablet (4 mg total) by mouth every 8 (eight) hours as needed (nausea).  Dispense: 12 tablet; Refill: 0      Patient Instructions   If you were prescribed a narcotic or controlled medication, do not drive or operate heavy equipment or machinery while taking these medications.  You must understand that you've received an Urgent Care treatment only and that you may be released before all your medical problems are known or treated. You, the patient, will arrange for follow up care as instructed.  Follow up with your PCP or specialty clinic as directed if not improved or as needed. You can call [phone] to schedule an appointment with the appropriate provider.  If your condition worsens we recommend that you receive another evaluation at the Emergency Department for any concerns or worsening of condition.  Patient aware and verbalized understanding.    You tested NEGATIVE for both flu and strep today.  Patient aware and verbalized understanding.    Take full course of antibiotics as prescribed.  Prednisone RX as prescribed to help reduce inflammation/swelling.  Zofran  RX as prescribed for nausea/vomiting as needed.  Humidifier use at home.  Warm compresses to affected ear.  Elevate head on a pillow at night.   Over the Counter Flonase Nasal Spray as directed for nasal congestion.  Over the Counter Claritin, Allegra or Zyrtec (plain) as directed for allergy symptoms/nasal congestion.  Over the Counter Tylenol or Motrin every 4 - 6 hours as needed for fever or ear pain.  No swimming for at least 7-10 days and DO NOT place any foreign objects including Q-tips into affected ear because this could make it worse.  Follow up with your PCP in 1 week of initiating antibiotics or sooner for no improvement in symptoms.  Follow up in the ER for any worsening of symptoms such as new fever, increasing ear pain, neck stiffness, shortness of breath, etc.  If you smoke, please stop smoking.  Patient aware, verbalized understanding and agreed with plan of care.    Middle Ear Infection (Adult)  You have an infection of the middle ear, the space behind the eardrum. This is also called acute otitis media (AOM). Sometimes it is caused by the common cold. This is because congestion can block the internal passage (eustachian tube) that drains fluid from the middle ear. When the middle ear fills with fluid, bacteria can grow there and cause an infection. Oral antibiotics are used to treat this illness, not ear drops. Symptoms usually start to improve within 1 to 2 days of treatment.    Home care  The following are general care guidelines:  · Finish all of the antibiotic medicine given, even though you may feel better after the first few days.  · You may use over-the-counter medicine, such as acetaminophen or ibuprofen, to control pain and fever, unless something else was prescribed. If you have chronic liver or kidney disease or have ever had a stomach ulcer or gastrointestinal bleeding, talk with your healthcare provider before using these medicines. Do not give aspirin to anyone under 18 years of age  who has a fever. It may cause severe illness or death.  Follow-up care  Follow up with your healthcare provider, or as advised, in 2 weeks if all symptoms have not gotten better, or if hearing doesn't go back to normal within 1 month.  When to seek medical advice  Call your healthcare provider right away if any of these occur:  · Ear pain gets worse or does not improve after 3 days of treatment  · Unusual drowsiness or confusion  · Neck pain, stiff neck, or headache  · Fluid or blood draining from the ear canal  · Fever of 100.4°F (38°C) or as advised   · Seizure  Date Last Reviewed: 6/1/2016 © 2000-2017 [employer] [address] [place] [address], [place] [address]. All rights reserved. This information is not intended as a substitute for professional medical care. Always follow your healthcare professional's instructions.

## 2019-09-19 NOTE — PATIENT INSTRUCTIONS
If you were prescribed a narcotic or controlled medication, do not drive or operate heavy equipment or machinery while taking these medications.  You must understand that you've received an Urgent Care treatment only and that you may be released before all your medical problems are known or treated. You, the patient, will arrange for follow up care as instructed.  Follow up with your PCP or specialty clinic as directed if not improved or as needed. You can call [phone] to schedule an appointment with the appropriate provider.  If your condition worsens we recommend that you receive another evaluation at the Emergency Department for any concerns or worsening of condition.  Patient aware and verbalized understanding.    You tested NEGATIVE for both flu and strep today.  Patient aware and verbalized understanding.    Take full course of antibiotics as prescribed.  Prednisone RX as prescribed to help reduce inflammation/swelling.  Zofran RX as prescribed for nausea/vomiting as needed.  Humidifier use at home.  Warm compresses to affected ear.  Elevate head on a pillow at night.   Over the Counter Flonase Nasal Spray as directed for nasal congestion.  Over the Counter Claritin, Allegra or Zyrtec (plain) as directed for allergy symptoms/nasal congestion.  Over the Counter Tylenol or Motrin every 4 - 6 hours as needed for fever or ear pain.  No swimming for at least 7-10 days and DO NOT place any foreign objects including Q-tips into affected ear because this could make it worse.  Follow up with your PCP in 1 week of initiating antibiotics or sooner for no improvement in symptoms.  Follow up in the ER for any worsening of symptoms such as new fever, increasing ear pain, neck stiffness, shortness of breath, etc.  If you smoke, please stop smoking.  Patient aware, verbalized understanding and agreed with plan of care.    Middle Ear Infection (Adult)  You have an infection of the middle ear, the space behind the  eardrum. This is also called acute otitis media (AOM). Sometimes it is caused by the common cold. This is because congestion can block the internal passage (eustachian tube) that drains fluid from the middle ear. When the middle ear fills with fluid, bacteria can grow there and cause an infection. Oral antibiotics are used to treat this illness, not ear drops. Symptoms usually start to improve within 1 to 2 days of treatment.    Home care  The following are general care guidelines:  · Finish all of the antibiotic medicine given, even though you may feel better after the first few days.  · You may use over-the-counter medicine, such as acetaminophen or ibuprofen, to control pain and fever, unless something else was prescribed. If you have chronic liver or kidney disease or have ever had a stomach ulcer or gastrointestinal bleeding, talk with your healthcare provider before using these medicines. Do not give aspirin to anyone under 18 years of age who has a fever. It may cause severe illness or death.  Follow-up care  Follow up with your healthcare provider, or as advised, in 2 weeks if all symptoms have not gotten better, or if hearing doesn't go back to normal within 1 month.  When to seek medical advice  Call your healthcare provider right away if any of these occur:  · Ear pain gets worse or does not improve after 3 days of treatment  · Unusual drowsiness or confusion  · Neck pain, stiff neck, or headache  · Fluid or blood draining from the ear canal  · Fever of 100.4°F (38°C) or as advised   · Seizure  Date Last Reviewed: 6/1/2016  © 9418-3122 [employer]. [address] [place] [address], [place] [address]. All rights reserved. This information is not intended as a substitute for professional medical care. Always follow your healthcare professional's instructions.

## 2019-10-07 ENCOUNTER — TELEPHONE (OUTPATIENT)
Dept: FAMILY MEDICINE | Facility: CLINIC | Age: 54
End: 2019-10-07

## 2019-10-07 NOTE — TELEPHONE ENCOUNTER
----- Message from Magdalena Ortiz sent at 10/7/2019  2:50 PM CDT -----  No. [phone]   New patient would like an appointment.  She would like to get established with Dr. English.

## 2019-11-25 ENCOUNTER — OFFICE VISIT (OUTPATIENT)
Dept: INTERNAL MEDICINE | Facility: CLINIC | Age: 54
End: 2019-11-25
Payer: COMMERCIAL

## 2019-11-25 ENCOUNTER — LAB VISIT (OUTPATIENT)
Dept: LAB | Facility: HOSPITAL | Age: 54
End: 2019-11-25
Attending: INTERNAL MEDICINE
Payer: COMMERCIAL

## 2019-11-25 VITALS
BODY MASS INDEX: 26.63 KG/M2 | WEIGHT: 159.81 LBS | SYSTOLIC BLOOD PRESSURE: 112 MMHG | HEART RATE: 70 BPM | TEMPERATURE: 98 F | HEIGHT: 65 IN | DIASTOLIC BLOOD PRESSURE: 64 MMHG

## 2019-11-25 DIAGNOSIS — Z00.00 ANNUAL PHYSICAL EXAM: ICD-10-CM

## 2019-11-25 DIAGNOSIS — H65.03 NON-RECURRENT ACUTE SEROUS OTITIS MEDIA OF BOTH EARS: ICD-10-CM

## 2019-11-25 DIAGNOSIS — Z11.59 NEED FOR HEPATITIS C SCREENING TEST: ICD-10-CM

## 2019-11-25 DIAGNOSIS — E06.3 HYPOTHYROIDISM DUE TO HASHIMOTO'S THYROIDITIS: ICD-10-CM

## 2019-11-25 DIAGNOSIS — Z00.00 ANNUAL PHYSICAL EXAM: Primary | ICD-10-CM

## 2019-11-25 DIAGNOSIS — S16.1XXA STRAIN OF NECK MUSCLE, INITIAL ENCOUNTER: ICD-10-CM

## 2019-11-25 DIAGNOSIS — E10.9 TYPE 1 DIABETES MELLITUS WITHOUT COMPLICATION: ICD-10-CM

## 2019-11-25 DIAGNOSIS — M21.42 PES PLANUS OF BOTH FEET: ICD-10-CM

## 2019-11-25 DIAGNOSIS — E03.8 HYPOTHYROIDISM DUE TO HASHIMOTO'S THYROIDITIS: ICD-10-CM

## 2019-11-25 DIAGNOSIS — R60.9 EDEMA, UNSPECIFIED TYPE: ICD-10-CM

## 2019-11-25 DIAGNOSIS — J30.9 ALLERGIC RHINITIS, UNSPECIFIED SEASONALITY, UNSPECIFIED TRIGGER: ICD-10-CM

## 2019-11-25 DIAGNOSIS — Z23 NEED FOR INFLUENZA VACCINATION: ICD-10-CM

## 2019-11-25 DIAGNOSIS — M21.41 PES PLANUS OF BOTH FEET: ICD-10-CM

## 2019-11-25 DIAGNOSIS — Z12.31 SCREENING MAMMOGRAM, ENCOUNTER FOR: ICD-10-CM

## 2019-11-25 DIAGNOSIS — Z12.11 ENCOUNTER FOR SCREENING COLONOSCOPY: ICD-10-CM

## 2019-11-25 LAB
ALBUMIN SERPL BCP-MCNC: 4.1 G/DL (ref 3.5–5.2)
ALP SERPL-CCNC: 116 U/L (ref 55–135)
ALT SERPL W/O P-5'-P-CCNC: 15 U/L (ref 10–44)
ANION GAP SERPL CALC-SCNC: 7 MMOL/L (ref 8–16)
AST SERPL-CCNC: 16 U/L (ref 10–40)
BASOPHILS # BLD AUTO: 0.08 K/UL (ref 0–0.2)
BASOPHILS NFR BLD: 1.5 % (ref 0–1.9)
BILIRUB SERPL-MCNC: 0.6 MG/DL (ref 0.1–1)
BNP SERPL-MCNC: 14 PG/ML (ref 0–99)
BUN SERPL-MCNC: 22 MG/DL (ref 6–20)
CALCIUM SERPL-MCNC: 9.1 MG/DL (ref 8.7–10.5)
CHLORIDE SERPL-SCNC: 108 MMOL/L (ref 95–110)
CHOLEST SERPL-MCNC: 219 MG/DL (ref 120–199)
CHOLEST/HDLC SERPL: 2.6 {RATIO} (ref 2–5)
CO2 SERPL-SCNC: 27 MMOL/L (ref 23–29)
CREAT SERPL-MCNC: 0.9 MG/DL (ref 0.5–1.4)
DIFFERENTIAL METHOD: NORMAL
EOSINOPHIL # BLD AUTO: 0.1 K/UL (ref 0–0.5)
EOSINOPHIL NFR BLD: 2.2 % (ref 0–8)
ERYTHROCYTE [DISTWIDTH] IN BLOOD BY AUTOMATED COUNT: 12.6 % (ref 11.5–14.5)
EST. GFR  (AFRICAN AMERICAN): >60 ML/MIN/1.73 M^2
EST. GFR  (NON AFRICAN AMERICAN): >60 ML/MIN/1.73 M^2
GLUCOSE SERPL-MCNC: 171 MG/DL (ref 70–110)
HCT VFR BLD AUTO: 44.4 % (ref 37–48.5)
HDLC SERPL-MCNC: 83 MG/DL (ref 40–75)
HDLC SERPL: 37.9 % (ref 20–50)
HGB BLD-MCNC: 14.7 G/DL (ref 12–16)
IMM GRANULOCYTES # BLD AUTO: 0.02 K/UL (ref 0–0.04)
IMM GRANULOCYTES NFR BLD AUTO: 0.4 % (ref 0–0.5)
LDLC SERPL CALC-MCNC: 123.6 MG/DL (ref 63–159)
LYMPHOCYTES # BLD AUTO: 2.3 K/UL (ref 1–4.8)
LYMPHOCYTES NFR BLD: 41.9 % (ref 18–48)
MCH RBC QN AUTO: 30.7 PG (ref 27–31)
MCHC RBC AUTO-ENTMCNC: 33.1 G/DL (ref 32–36)
MCV RBC AUTO: 93 FL (ref 82–98)
MONOCYTES # BLD AUTO: 0.5 K/UL (ref 0.3–1)
MONOCYTES NFR BLD: 8.7 % (ref 4–15)
NEUTROPHILS # BLD AUTO: 2.5 K/UL (ref 1.8–7.7)
NEUTROPHILS NFR BLD: 45.3 % (ref 38–73)
NONHDLC SERPL-MCNC: 136 MG/DL
NRBC BLD-RTO: 0 /100 WBC
PLATELET # BLD AUTO: 243 K/UL (ref 150–350)
PMV BLD AUTO: 10.3 FL (ref 9.2–12.9)
POTASSIUM SERPL-SCNC: 4.4 MMOL/L (ref 3.5–5.1)
PROT SERPL-MCNC: 6.6 G/DL (ref 6–8.4)
RBC # BLD AUTO: 4.79 M/UL (ref 4–5.4)
SODIUM SERPL-SCNC: 142 MMOL/L (ref 136–145)
TRIGL SERPL-MCNC: 62 MG/DL (ref 30–150)
TSH SERPL DL<=0.005 MIU/L-ACNC: 1.1 UIU/ML (ref 0.4–4)
WBC # BLD AUTO: 5.49 K/UL (ref 3.9–12.7)

## 2019-11-25 PROCEDURE — 99396 PR PREVENTIVE VISIT,EST,40-64: ICD-10-PCS | Mod: 25,S$GLB,, | Performed by: INTERNAL MEDICINE

## 2019-11-25 PROCEDURE — 83036 HEMOGLOBIN GLYCOSYLATED A1C: CPT

## 2019-11-25 PROCEDURE — 80061 LIPID PANEL: CPT

## 2019-11-25 PROCEDURE — 83880 ASSAY OF NATRIURETIC PEPTIDE: CPT

## 2019-11-25 PROCEDURE — 90686 FLU VACCINE (QUAD) GREATER THAN OR EQUAL TO 3YO PRESERVATIVE FREE IM: ICD-10-PCS | Mod: S$GLB,,, | Performed by: INTERNAL MEDICINE

## 2019-11-25 PROCEDURE — 90686 IIV4 VACC NO PRSV 0.5 ML IM: CPT | Mod: S$GLB,,, | Performed by: INTERNAL MEDICINE

## 2019-11-25 PROCEDURE — 99396 PREV VISIT EST AGE 40-64: CPT | Mod: 25,S$GLB,, | Performed by: INTERNAL MEDICINE

## 2019-11-25 PROCEDURE — 90471 FLU VACCINE (QUAD) GREATER THAN OR EQUAL TO 3YO PRESERVATIVE FREE IM: ICD-10-PCS | Mod: S$GLB,,, | Performed by: INTERNAL MEDICINE

## 2019-11-25 PROCEDURE — 85025 COMPLETE CBC W/AUTO DIFF WBC: CPT

## 2019-11-25 PROCEDURE — 80053 COMPREHEN METABOLIC PANEL: CPT

## 2019-11-25 PROCEDURE — 84443 ASSAY THYROID STIM HORMONE: CPT

## 2019-11-25 PROCEDURE — 90471 IMMUNIZATION ADMIN: CPT | Mod: S$GLB,,, | Performed by: INTERNAL MEDICINE

## 2019-11-25 PROCEDURE — 36415 COLL VENOUS BLD VENIPUNCTURE: CPT | Mod: PO

## 2019-11-25 PROCEDURE — 99999 PR PBB SHADOW E&M-EST. PATIENT-LVL IV: ICD-10-PCS | Mod: PBBFAC,,, | Performed by: INTERNAL MEDICINE

## 2019-11-25 PROCEDURE — 86803 HEPATITIS C AB TEST: CPT

## 2019-11-25 PROCEDURE — 99999 PR PBB SHADOW E&M-EST. PATIENT-LVL IV: CPT | Mod: PBBFAC,,, | Performed by: INTERNAL MEDICINE

## 2019-11-25 RX ORDER — FLUTICASONE PROPIONATE 50 MCG
2 SPRAY, SUSPENSION (ML) NASAL DAILY
Qty: 16 G | Refills: 11 | Status: SHIPPED | OUTPATIENT
Start: 2019-11-25 | End: 2019-12-25

## 2019-11-25 RX ORDER — AMOXICILLIN AND CLAVULANATE POTASSIUM 875; 125 MG/1; MG/1
TABLET, FILM COATED ORAL
Refills: 0 | COMMUNITY
Start: 2019-11-20 | End: 2020-03-28 | Stop reason: ALTCHOICE

## 2019-11-25 RX ORDER — CIPROFLOXACIN AND DEXAMETHASONE 3; 1 MG/ML; MG/ML
SUSPENSION/ DROPS AURICULAR (OTIC)
Refills: 0 | COMMUNITY
Start: 2019-11-20 | End: 2020-01-17 | Stop reason: SDUPTHER

## 2019-11-25 RX ORDER — MONTELUKAST SODIUM 10 MG/1
10 TABLET ORAL NIGHTLY
Qty: 90 TABLET | Refills: 3 | Status: SHIPPED | OUTPATIENT
Start: 2019-11-25 | End: 2019-12-25

## 2019-11-25 NOTE — PROGRESS NOTES
Subjective:       Patient ID: Halle Spence is a 53 y.o. female.    Chief Complaint: [facility]    HPI Mrs. Spence is a 53-year-old female with type 1 diabetes, hypothyroidism due to Hashimoto's thyroiditis, and allergic rhinitis who presents to [facility], for annual exam, and with chief complaint of earache and retaining fluid.  Onset of earache was 1.5 months ago.  She went to an urgent care was treated with amoxicillin for 10 days.  It felt slightly better but she continued to have a slight ache.  She then went to see her ENT told her there was a pimple forming in the ear canal.  He treated her with in office steroid shot and antibiotic shot.  She got prescriptions for Augmentin and steroid drops.  She is on day 5 of these medications.  Reports continued ache in the left ear.  Patient has been retaining fluid.  Feels that her weight fluctuates.  Fluid retention most noticeable in feet and face.  Also has flat feet and wears inserts in her shoes.  Swelling in the feet improves with elevation.  Facial swelling most noticeable when she had severe allergic symptoms.  No associated shortness of breath of her swelling.  She had a hysterectomy for benign reasons.  She is not up-to-date with mammogram or colonoscopy.  She follows with her endocrinologist every 3 months for treatment of hypothyroidism caused by Hashimoto's thyroiditis and type 1 diabetes.  She is currently on an insulin pump.  Last A1c was 7.9, prior to this it was 8.5.  Her OBGYN is Dr. Green.  Her ENT is .   Also complains of left-sided neck pain. She is wondering if this is related to her ear.    Review of Systems   Constitutional: Negative for activity change and unexpected weight change.   HENT: Negative for hearing loss, rhinorrhea and trouble swallowing.    Eyes: Negative for discharge and visual disturbance.   Respiratory: Negative for chest tightness and wheezing.    Cardiovascular: Negative for chest pain and  palpitations.   Gastrointestinal: Negative for blood in stool, constipation, diarrhea and vomiting.   Endocrine: Negative for polydipsia and polyuria.   Genitourinary: Negative for difficulty urinating, dysuria, hematuria and menstrual problem.   Musculoskeletal: Positive for neck pain. Negative for arthralgias and joint swelling.   Neurological: Positive for headaches. Negative for weakness.   Psychiatric/Behavioral: Negative for confusion and dysphoric mood.       Objective:      Physical Exam   Constitutional: She is oriented to person, place, and time. She appears well-developed and well-nourished. No distress.   HENT:   Head: Normocephalic and atraumatic.   Right Ear: External ear and ear canal normal.   Left Ear: External ear and ear canal normal.   Nose: Nose normal.   No light reflex appreciated bilaterally.  No comedones present.   Eyes: Conjunctivae and EOM are normal.   Neck:   Tense, tight left-sided cervical neck muscles.   Cardiovascular: Normal rate, regular rhythm, normal heart sounds and intact distal pulses. Exam reveals no gallop and no friction rub.   No murmur heard.  Pulmonary/Chest: Effort normal and breath sounds normal. No stridor. No respiratory distress. She has no wheezes. She has no rales.   Neurological: She is alert and oriented to person, place, and time.   Skin: Skin is warm and dry. She is not diaphoretic.   Psychiatric: She has a normal mood and affect. Her behavior is normal. Judgment and thought content normal.   Vitals reviewed.      Assessment:       1. Annual physical exam    2. Screening mammogram, encounter for    3. Encounter for screening colonoscopy    4. Need for influenza vaccination    5. Allergic rhinitis, unspecified seasonality, unspecified trigger Active   6. Non-recurrent acute serous otitis media of both ears Active   7. Type 1 diabetes mellitus without complication Sub-optimally controlled   8. Hypothyroidism due to Hashimoto's thyroiditis    9. Need for  hepatitis C screening test    10. Edema, unspecified type Inactive   11. Strain of neck muscle, initial encounter Active   12. Pes planus of both feet        Plan:     Edema: Will check renal function and BNP. But likely due to two different etiologies. Swelling in the face likely due to allergies. And swelling in the feet like due to pes planus deformity. Recommend allergy treatment as below and wearing her foot inserts for pes planus deformity.      Halle was seen today for establish care.    Diagnoses and all orders for this visit:    Annual physical exam  Comments:  Not up-to-date with mammogram or colonoscopy.  Orders:  -     Comprehensive metabolic panel; Future  -     CBC auto differential; Future  -     Hemoglobin A1c; Future  -     Lipid panel; Future  -     TSH; Future  -     Brain natriuretic peptide; Future    Screening mammogram, encounter for  -     Mammo Digital Screening Bilat; Future  -     Mammo Digital Screening Bilat    Encounter for screening colonoscopy  -     Case request GI: COLONOSCOPY    Need for influenza vaccination  -     Influenza - Quadrivalent (PF)    Allergic rhinitis, unspecified seasonality, unspecified trigger  Comments:  Discontinue OTC allergy pills. Start singulair and flonase. Use both daily  Orders:  -     montelukast (SINGULAIR) 10 mg tablet; Take 1 tablet (10 mg total) by mouth every evening.  -     fluticasone propionate (FLONASE) 50 mcg/actuation nasal spray; 2 sprays (100 mcg total) by Each Nostril route once daily.    Non-recurrent acute serous otitis media of both ears  Comments:  Finish antibiotics and steroids as prescribed by ENT. Discontinue OTC allergy pills. Start singulair and flonase. Use both daily. If no relief, f/u with ENT  Orders:  -     montelukast (SINGULAIR) 10 mg tablet; Take 1 tablet (10 mg total) by mouth every evening.  -     fluticasone propionate (FLONASE) 50 mcg/actuation nasal spray; 2 sprays (100 mcg total) by Each Nostril route once  daily.    Type 1 diabetes mellitus without complication  Comments:  Being managed by endocrinology. Sees them every 3 months    Hypothyroidism due to Hashimoto's thyroiditis  Comments:  Status unknown. Managed by endocrinology. Sees them every 3 months    Need for hepatitis C screening test  -     Hepatitis C antibody; Future    Edema, unspecified type    Strain of neck muscle, initial encounter  Comments:  Recommend use of heating pad and OTC NSAIDS as needed for pain relief    Pes planus of both feet    RTC one year and PRN

## 2019-11-26 LAB
ESTIMATED AVG GLUCOSE: 177 MG/DL (ref 68–131)
HBA1C MFR BLD HPLC: 7.8 % (ref 4–5.6)

## 2019-11-27 LAB — HCV AB SERPL QL IA: NEGATIVE

## 2019-12-04 ENCOUNTER — TELEPHONE (OUTPATIENT)
Dept: GASTROENTEROLOGY | Facility: CLINIC | Age: 54
End: 2019-12-04

## 2020-01-17 ENCOUNTER — OFFICE VISIT (OUTPATIENT)
Dept: INTERNAL MEDICINE | Facility: CLINIC | Age: 55
End: 2020-01-17
Payer: COMMERCIAL

## 2020-01-17 ENCOUNTER — TELEPHONE (OUTPATIENT)
Dept: GASTROENTEROLOGY | Facility: CLINIC | Age: 55
End: 2020-01-17

## 2020-01-17 VITALS
DIASTOLIC BLOOD PRESSURE: 62 MMHG | HEIGHT: 65 IN | SYSTOLIC BLOOD PRESSURE: 90 MMHG | HEART RATE: 71 BPM | BODY MASS INDEX: 26.6 KG/M2 | WEIGHT: 159.63 LBS | OXYGEN SATURATION: 96 % | TEMPERATURE: 98 F

## 2020-01-17 DIAGNOSIS — H69.93 DYSFUNCTION OF BOTH EUSTACHIAN TUBES: ICD-10-CM

## 2020-01-17 DIAGNOSIS — J30.9 ALLERGIC RHINITIS, UNSPECIFIED SEASONALITY, UNSPECIFIED TRIGGER: ICD-10-CM

## 2020-01-17 DIAGNOSIS — B34.9 VIRAL ILLNESS: Primary | ICD-10-CM

## 2020-01-17 LAB
CTP QC/QA: YES
FLUAV AG NPH QL: NEGATIVE
FLUBV AG NPH QL: NEGATIVE

## 2020-01-17 PROCEDURE — 87804 INFLUENZA ASSAY W/OPTIC: CPT | Mod: QW,S$GLB,, | Performed by: INTERNAL MEDICINE

## 2020-01-17 PROCEDURE — 3008F BODY MASS INDEX DOCD: CPT | Mod: CPTII,S$GLB,, | Performed by: INTERNAL MEDICINE

## 2020-01-17 PROCEDURE — 99999 PR PBB SHADOW E&M-EST. PATIENT-LVL III: ICD-10-PCS | Mod: PBBFAC,,, | Performed by: INTERNAL MEDICINE

## 2020-01-17 PROCEDURE — 3008F PR BODY MASS INDEX (BMI) DOCUMENTED: ICD-10-PCS | Mod: CPTII,S$GLB,, | Performed by: INTERNAL MEDICINE

## 2020-01-17 PROCEDURE — 99214 OFFICE O/P EST MOD 30 MIN: CPT | Mod: 25,S$GLB,, | Performed by: INTERNAL MEDICINE

## 2020-01-17 PROCEDURE — 99999 PR PBB SHADOW E&M-EST. PATIENT-LVL III: CPT | Mod: PBBFAC,,, | Performed by: INTERNAL MEDICINE

## 2020-01-17 PROCEDURE — 99214 PR OFFICE/OUTPT VISIT, EST, LEVL IV, 30-39 MIN: ICD-10-PCS | Mod: 25,S$GLB,, | Performed by: INTERNAL MEDICINE

## 2020-01-17 PROCEDURE — 87804 POCT INFLUENZA A/B: ICD-10-PCS | Mod: QW,S$GLB,, | Performed by: INTERNAL MEDICINE

## 2020-01-17 RX ORDER — PREDNISONE 20 MG/1
20 TABLET ORAL DAILY
Qty: 20 TABLET | Refills: 0 | Status: SHIPPED | OUTPATIENT
Start: 2020-01-17 | End: 2020-01-27

## 2020-01-17 RX ORDER — CIPROFLOXACIN AND DEXAMETHASONE 3; 1 MG/ML; MG/ML
SUSPENSION/ DROPS AURICULAR (OTIC)
Qty: 7.5 ML | Refills: 1 | Status: SHIPPED | OUTPATIENT
Start: 2020-01-17 | End: 2020-03-28 | Stop reason: ALTCHOICE

## 2020-01-17 NOTE — PROGRESS NOTES
Patient ID: Halle Spence is a 54 y.o. female.    Chief Complaint: Sore Throat (x 2 days); Otalgia (left mainly); and Cough    HPI Halle is a 54 y.o. female who presents today with chief complaint of cough and sore throat.  Onset was last night.  She has associated symptoms of chills and ear discomfort.  Symptoms are constant in duration.  She is taking her regular singulair and Flonase, but with no relief of her symptoms.  Nothing is making symptoms better currently.  Of note, she has had multiple sick contacts in her office who tested positive for influenza.  Also of note, she follows with ENT Dr. Lcuio for chronic sinusitis and chronic otitis media.    Review of Systems   Constitutional: Negative for fever.   HENT: Positive for ear pain and sore throat.    Respiratory: Positive for cough.         Objective:     Vitals:    01/17/20 0842   BP: 90/62   Pulse: 71   Temp: 98 °F (36.7 °C)        Physical Exam   Constitutional: She is oriented to person, place, and time. She appears well-developed and well-nourished. No distress.   HENT:   Head: Normocephalic and atraumatic.   Right Ear: External ear normal.   Left Ear: External ear normal.   Nose: Nose normal.   Mouth/Throat: Oropharynx is clear and moist. No oropharyngeal exudate.   Small ear canals.  Fluid behind TMs bilaterally.   Eyes: Conjunctivae and EOM are normal. Right eye exhibits no discharge. Left eye exhibits no discharge. No scleral icterus.   Cardiovascular: Normal rate, regular rhythm, normal heart sounds and intact distal pulses. Exam reveals no gallop and no friction rub.   No murmur heard.  Pulmonary/Chest: Effort normal and breath sounds normal. No respiratory distress. She has no wheezes. She has no rales.   Neurological: She is alert and oriented to person, place, and time.   Skin: Skin is warm and dry. She is not diaphoretic.   Psychiatric: She has a normal mood and affect. Her behavior is normal. Judgment and thought content normal.    Vitals reviewed.      Assessment:       1. Viral illness Active   2. Dysfunction of both eustachian tubes Active   3. Allergic rhinitis, unspecified seasonality, unspecified trigger Active       Plan:     Recommend follow up with her ENT for chronic eustachian tube dysfunction.  Patient states she will follow up with her ENT.  Influenza test negative.     Viral illness  -     POCT Influenza A/B  -     predniSONE (DELTASONE) 20 MG tablet; Take 1 tablet (20 mg total) by mouth once daily. Take 3 pills daily for 3 days, then 2 pills daily for 3 days, then 1 pill daily for 3 days, then 1/2 pill daily for 4 days. for 10 days  Dispense: 20 tablet; Refill: 0    Dysfunction of both eustachian tubes  Comments:  Follow-up with ENT.  Orders:  -     predniSONE (DELTASONE) 20 MG tablet; Take 1 tablet (20 mg total) by mouth once daily. Take 3 pills daily for 3 days, then 2 pills daily for 3 days, then 1 pill daily for 3 days, then 1/2 pill daily for 4 days. for 10 days  Dispense: 20 tablet; Refill: 0  -     CIPRODEX 0.3-0.1 % DrpS; PLACE 4 DROP INTO BOTH EARS BID  Dispense: 7.5 mL; Refill: 1    Allergic rhinitis, unspecified seasonality, unspecified trigger  Comments:  Continue current medications and follow up with ENT.        RTC PRN    Warning signs discussed, patient to call with any further issues or worsening of symptoms.       Parts of the above note were dictated using a voice dictation software. Please excuse any grammatical or typographical errors.

## 2020-03-28 ENCOUNTER — HOSPITAL ENCOUNTER (INPATIENT)
Facility: HOSPITAL | Age: 55
LOS: 2 days | Discharge: ANOTHER HOSPITAL | DRG: 637 | End: 2020-03-30
Attending: EMERGENCY MEDICINE | Admitting: FAMILY MEDICINE
Payer: COMMERCIAL

## 2020-03-28 DIAGNOSIS — J18.9 PRIMARY ATYPICAL PNEUMONIA: ICD-10-CM

## 2020-03-28 DIAGNOSIS — R07.89 CHEST PRESSURE: ICD-10-CM

## 2020-03-28 DIAGNOSIS — Z20.822 SUSPECTED 2019 NOVEL CORONAVIRUS INFECTION: ICD-10-CM

## 2020-03-28 DIAGNOSIS — R73.9 HYPERGLYCEMIA: ICD-10-CM

## 2020-03-28 DIAGNOSIS — E11.10 DIABETIC KETOACIDOSIS WITHOUT COMA ASSOCIATED WITH TYPE 2 DIABETES MELLITUS: Primary | ICD-10-CM

## 2020-03-28 DIAGNOSIS — A41.9 SEPSIS, DUE TO UNSPECIFIED ORGANISM, UNSPECIFIED WHETHER ACUTE ORGAN DYSFUNCTION PRESENT: ICD-10-CM

## 2020-03-28 DIAGNOSIS — E10.65 TYPE 1 DIABETES MELLITUS WITH HYPERGLYCEMIA: ICD-10-CM

## 2020-03-28 DIAGNOSIS — J18.9 PNEUMONIA DUE TO INFECTIOUS ORGANISM, UNSPECIFIED LATERALITY, UNSPECIFIED PART OF LUNG: ICD-10-CM

## 2020-03-28 DIAGNOSIS — R00.0 TACHYCARDIA: ICD-10-CM

## 2020-03-28 DIAGNOSIS — E10.10 DIABETIC KETOACIDOSIS WITHOUT COMA ASSOCIATED WITH TYPE 1 DIABETES MELLITUS: ICD-10-CM

## 2020-03-28 PROBLEM — E87.20 METABOLIC ACIDOSIS: Status: ACTIVE | Noted: 2020-03-28

## 2020-03-28 PROBLEM — R79.89 ELEVATED D-DIMER: Status: ACTIVE | Noted: 2020-03-28

## 2020-03-28 PROBLEM — E87.5 HYPERKALEMIA: Status: ACTIVE | Noted: 2020-03-28

## 2020-03-28 PROBLEM — D72.829 LEUKOCYTOSIS: Status: ACTIVE | Noted: 2020-03-28

## 2020-03-28 PROBLEM — E87.20 LACTIC ACIDOSIS: Status: ACTIVE | Noted: 2020-03-28

## 2020-03-28 LAB
ALBUMIN SERPL BCP-MCNC: 4 G/DL (ref 3.5–5.2)
ALP SERPL-CCNC: 138 U/L (ref 55–135)
ALT SERPL W/O P-5'-P-CCNC: 19 U/L (ref 10–44)
ANION GAP SERPL CALC-SCNC: ABNORMAL MMOL/L (ref 8–16)
AST SERPL-CCNC: 20 U/L (ref 10–40)
B-OH-BUTYR BLD STRIP-SCNC: 4.2 MMOL/L (ref 0–0.5)
BACTERIA #/AREA URNS HPF: NORMAL /HPF
BASOPHILS # BLD AUTO: 0.12 K/UL (ref 0–0.2)
BASOPHILS NFR BLD: 0.4 % (ref 0–1.9)
BILIRUB SERPL-MCNC: 0.2 MG/DL (ref 0.1–1)
BILIRUB UR QL STRIP: NEGATIVE
BNP SERPL-MCNC: 44 PG/ML (ref 0–99)
BUN SERPL-MCNC: 25 MG/DL (ref 6–20)
CALCIUM SERPL-MCNC: 8.8 MG/DL (ref 8.7–10.5)
CHLORIDE SERPL-SCNC: 113 MMOL/L (ref 95–110)
CLARITY UR: CLEAR
CO2 SERPL-SCNC: <5 MMOL/L (ref 23–29)
COLOR UR: YELLOW
CREAT SERPL-MCNC: 1.5 MG/DL (ref 0.5–1.4)
D DIMER PPP IA.FEU-MCNC: 1.13 MG/L FEU
DIFFERENTIAL METHOD: ABNORMAL
EOSINOPHIL # BLD AUTO: 0 K/UL (ref 0–0.5)
EOSINOPHIL NFR BLD: 0.1 % (ref 0–8)
ERYTHROCYTE [DISTWIDTH] IN BLOOD BY AUTOMATED COUNT: 12.9 % (ref 11.5–14.5)
EST. GFR  (AFRICAN AMERICAN): 45 ML/MIN/1.73 M^2
EST. GFR  (NON AFRICAN AMERICAN): 39 ML/MIN/1.73 M^2
FERRITIN SERPL-MCNC: 530 NG/ML (ref 20–300)
GLUCOSE SERPL-MCNC: 545 MG/DL (ref 70–110)
GLUCOSE SERPL-MCNC: >500 MG/DL (ref 70–110)
GLUCOSE UR QL STRIP: ABNORMAL
HCT VFR BLD AUTO: 52.5 % (ref 37–48.5)
HGB BLD-MCNC: 16.2 G/DL (ref 12–16)
HGB UR QL STRIP: ABNORMAL
HYALINE CASTS #/AREA URNS LPF: 0 /LPF
IMM GRANULOCYTES # BLD AUTO: 0.44 K/UL (ref 0–0.04)
IMM GRANULOCYTES NFR BLD AUTO: 1.6 % (ref 0–0.5)
INFLUENZA A, MOLECULAR: NEGATIVE
INFLUENZA B, MOLECULAR: NEGATIVE
INR PPP: 0.9 (ref 0.8–1.2)
KETONES UR QL STRIP: ABNORMAL
LACTATE SERPL-SCNC: 6.5 MMOL/L (ref 0.5–2.2)
LEUKOCYTE ESTERASE UR QL STRIP: NEGATIVE
LIPASE SERPL-CCNC: 72 U/L (ref 4–60)
LYMPHOCYTES # BLD AUTO: 2.1 K/UL (ref 1–4.8)
LYMPHOCYTES NFR BLD: 7.7 % (ref 18–48)
MAGNESIUM SERPL-MCNC: 2.6 MG/DL (ref 1.6–2.6)
MCH RBC QN AUTO: 30.6 PG (ref 27–31)
MCHC RBC AUTO-ENTMCNC: 30.9 G/DL (ref 32–36)
MCV RBC AUTO: 99 FL (ref 82–98)
MICROSCOPIC COMMENT: NORMAL
MONOCYTES # BLD AUTO: 1.3 K/UL (ref 0.3–1)
MONOCYTES NFR BLD: 4.7 % (ref 4–15)
NEUTROPHILS # BLD AUTO: 23.6 K/UL (ref 1.8–7.7)
NEUTROPHILS NFR BLD: 85.5 % (ref 38–73)
NITRITE UR QL STRIP: NEGATIVE
NRBC BLD-RTO: 0 /100 WBC
PH UR STRIP: 6 [PH] (ref 5–8)
PLATELET # BLD AUTO: 346 K/UL (ref 150–350)
PLATELET BLD QL SMEAR: ABNORMAL
PMV BLD AUTO: 10.7 FL (ref 9.2–12.9)
POCT GLUCOSE: 412 MG/DL (ref 70–110)
POCT GLUCOSE: 452 MG/DL (ref 70–110)
POCT GLUCOSE: 452 MG/DL (ref 70–110)
POCT GLUCOSE: 470 MG/DL (ref 70–110)
POCT GLUCOSE: 478 MG/DL (ref 70–110)
POLYCHROMASIA BLD QL SMEAR: ABNORMAL
POTASSIUM SERPL-SCNC: 5.4 MMOL/L (ref 3.5–5.1)
PROT SERPL-MCNC: 7.2 G/DL (ref 6–8.4)
PROT UR QL STRIP: ABNORMAL
PROTHROMBIN TIME: 10 SEC (ref 9–12.5)
RBC # BLD AUTO: 5.3 M/UL (ref 4–5.4)
RBC #/AREA URNS HPF: 0 /HPF (ref 0–4)
SODIUM SERPL-SCNC: 144 MMOL/L (ref 136–145)
SP GR UR STRIP: >=1.03 (ref 1–1.03)
SPECIMEN SOURCE: NORMAL
TROPONIN I SERPL DL<=0.01 NG/ML-MCNC: 0.01 NG/ML (ref 0–0.03)
URN SPEC COLLECT METH UR: ABNORMAL
UROBILINOGEN UR STRIP-ACNC: NEGATIVE EU/DL
WBC # BLD AUTO: 27.63 K/UL (ref 3.9–12.7)
WBC #/AREA URNS HPF: 1 /HPF (ref 0–5)
YEAST URNS QL MICRO: NORMAL

## 2020-03-28 PROCEDURE — 81000 URINALYSIS NONAUTO W/SCOPE: CPT

## 2020-03-28 PROCEDURE — 85025 COMPLETE CBC W/AUTO DIFF WBC: CPT

## 2020-03-28 PROCEDURE — 83605 ASSAY OF LACTIC ACID: CPT

## 2020-03-28 PROCEDURE — 85610 PROTHROMBIN TIME: CPT

## 2020-03-28 PROCEDURE — 87040 BLOOD CULTURE FOR BACTERIA: CPT | Mod: 59

## 2020-03-28 PROCEDURE — 25000003 PHARM REV CODE 250: Performed by: NURSE PRACTITIONER

## 2020-03-28 PROCEDURE — 85379 FIBRIN DEGRADATION QUANT: CPT

## 2020-03-28 PROCEDURE — 99291 CRITICAL CARE FIRST HOUR: CPT | Mod: 25

## 2020-03-28 PROCEDURE — 96375 TX/PRO/DX INJ NEW DRUG ADDON: CPT

## 2020-03-28 PROCEDURE — 82728 ASSAY OF FERRITIN: CPT

## 2020-03-28 PROCEDURE — 93010 EKG 12-LEAD: ICD-10-PCS | Mod: ,,, | Performed by: INTERNAL MEDICINE

## 2020-03-28 PROCEDURE — 83605 ASSAY OF LACTIC ACID: CPT | Mod: 91

## 2020-03-28 PROCEDURE — 63600175 PHARM REV CODE 636 W HCPCS: Performed by: NURSE PRACTITIONER

## 2020-03-28 PROCEDURE — 96365 THER/PROPH/DIAG IV INF INIT: CPT | Mod: 59

## 2020-03-28 PROCEDURE — 63600175 PHARM REV CODE 636 W HCPCS: Performed by: EMERGENCY MEDICINE

## 2020-03-28 PROCEDURE — U0002 COVID-19 LAB TEST NON-CDC: HCPCS

## 2020-03-28 PROCEDURE — 96361 HYDRATE IV INFUSION ADD-ON: CPT

## 2020-03-28 PROCEDURE — 99900035 HC TECH TIME PER 15 MIN (STAT)

## 2020-03-28 PROCEDURE — 93010 ELECTROCARDIOGRAM REPORT: CPT | Mod: ,,, | Performed by: INTERNAL MEDICINE

## 2020-03-28 PROCEDURE — 80053 COMPREHEN METABOLIC PANEL: CPT | Mod: 91

## 2020-03-28 PROCEDURE — 12000002 HC ACUTE/MED SURGE SEMI-PRIVATE ROOM

## 2020-03-28 PROCEDURE — 84484 ASSAY OF TROPONIN QUANT: CPT

## 2020-03-28 PROCEDURE — 83735 ASSAY OF MAGNESIUM: CPT

## 2020-03-28 PROCEDURE — 93005 ELECTROCARDIOGRAM TRACING: CPT

## 2020-03-28 PROCEDURE — 83690 ASSAY OF LIPASE: CPT

## 2020-03-28 PROCEDURE — 82803 BLOOD GASES ANY COMBINATION: CPT

## 2020-03-28 PROCEDURE — 87502 INFLUENZA DNA AMP PROBE: CPT

## 2020-03-28 PROCEDURE — 96366 THER/PROPH/DIAG IV INF ADDON: CPT | Mod: 59

## 2020-03-28 PROCEDURE — 80053 COMPREHEN METABOLIC PANEL: CPT

## 2020-03-28 PROCEDURE — 82010 KETONE BODYS QUAN: CPT

## 2020-03-28 PROCEDURE — 83880 ASSAY OF NATRIURETIC PEPTIDE: CPT

## 2020-03-28 PROCEDURE — 25000003 PHARM REV CODE 250: Performed by: EMERGENCY MEDICINE

## 2020-03-28 PROCEDURE — 82962 GLUCOSE BLOOD TEST: CPT

## 2020-03-28 RX ORDER — SODIUM CHLORIDE 9 MG/ML
1000 INJECTION, SOLUTION INTRAVENOUS
Status: COMPLETED | OUTPATIENT
Start: 2020-03-28 | End: 2020-03-28

## 2020-03-28 RX ORDER — SODIUM CHLORIDE 9 MG/ML
INJECTION, SOLUTION INTRAVENOUS CONTINUOUS
Status: DISCONTINUED | OUTPATIENT
Start: 2020-03-28 | End: 2020-03-30

## 2020-03-28 RX ORDER — ONDANSETRON 2 MG/ML
8 INJECTION INTRAMUSCULAR; INTRAVENOUS ONCE
Status: COMPLETED | OUTPATIENT
Start: 2020-03-28 | End: 2020-03-28

## 2020-03-28 RX ORDER — MONTELUKAST SODIUM 10 MG/1
10 TABLET ORAL NIGHTLY
COMMUNITY
End: 2020-11-18

## 2020-03-28 RX ORDER — INDOMETHACIN 25 MG/1
50 CAPSULE ORAL
Status: COMPLETED | OUTPATIENT
Start: 2020-03-28 | End: 2020-03-28

## 2020-03-28 RX ORDER — DEXTROSE MONOHYDRATE 100 MG/ML
1000 INJECTION, SOLUTION INTRAVENOUS
Status: DISCONTINUED | OUTPATIENT
Start: 2020-03-28 | End: 2020-03-30

## 2020-03-28 RX ORDER — ENOXAPARIN SODIUM 100 MG/ML
30 INJECTION SUBCUTANEOUS EVERY 24 HOURS
Status: DISCONTINUED | OUTPATIENT
Start: 2020-03-28 | End: 2020-03-30 | Stop reason: HOSPADM

## 2020-03-28 RX ORDER — SODIUM CHLORIDE 0.9 % (FLUSH) 0.9 %
10 SYRINGE (ML) INJECTION
Status: DISCONTINUED | OUTPATIENT
Start: 2020-03-28 | End: 2020-03-30 | Stop reason: HOSPADM

## 2020-03-28 RX ORDER — TALC
6 POWDER (GRAM) TOPICAL NIGHTLY PRN
Status: DISCONTINUED | OUTPATIENT
Start: 2020-03-28 | End: 2020-03-30 | Stop reason: HOSPADM

## 2020-03-28 RX ORDER — ACETAMINOPHEN 325 MG/1
650 TABLET ORAL EVERY 6 HOURS PRN
Status: DISCONTINUED | OUTPATIENT
Start: 2020-03-28 | End: 2020-03-30 | Stop reason: HOSPADM

## 2020-03-28 RX ORDER — ONDANSETRON 2 MG/ML
4 INJECTION INTRAMUSCULAR; INTRAVENOUS EVERY 6 HOURS PRN
Status: DISCONTINUED | OUTPATIENT
Start: 2020-03-28 | End: 2020-03-30 | Stop reason: HOSPADM

## 2020-03-28 RX ADMIN — PIPERACILLIN SODIUM AND TAZOBACTAM SODIUM 4.5 G: 4; .5 INJECTION, POWDER, LYOPHILIZED, FOR SOLUTION INTRAVENOUS at 07:03

## 2020-03-28 RX ADMIN — SODIUM CHLORIDE 1000 ML: 0.9 INJECTION, SOLUTION INTRAVENOUS at 06:03

## 2020-03-28 RX ADMIN — VANCOMYCIN HYDROCHLORIDE 2000 MG: 100 INJECTION, POWDER, LYOPHILIZED, FOR SOLUTION INTRAVENOUS at 07:03

## 2020-03-28 RX ADMIN — SODIUM CHLORIDE: 0.9 INJECTION, SOLUTION INTRAVENOUS at 10:03

## 2020-03-28 RX ADMIN — SODIUM CHLORIDE 5 UNITS/HR: 9 INJECTION, SOLUTION INTRAVENOUS at 07:03

## 2020-03-28 RX ADMIN — ONDANSETRON 8 MG: 2 INJECTION INTRAMUSCULAR; INTRAVENOUS at 06:03

## 2020-03-28 RX ADMIN — SODIUM BICARBONATE 50 MEQ: 84 INJECTION, SOLUTION INTRAVENOUS at 07:03

## 2020-03-28 RX ADMIN — INSULIN HUMAN 5 UNITS: 100 INJECTION, SOLUTION PARENTERAL at 07:03

## 2020-03-28 RX ADMIN — SODIUM CHLORIDE 1000 ML: 0.9 INJECTION, SOLUTION INTRAVENOUS at 07:03

## 2020-03-28 RX ADMIN — ACETAMINOPHEN 650 MG: 325 TABLET ORAL at 10:03

## 2020-03-28 RX ADMIN — SODIUM CHLORIDE 5 UNITS/HR: 9 INJECTION, SOLUTION INTRAVENOUS at 09:03

## 2020-03-28 NOTE — ED PROVIDER NOTES
Encounter Date: 3/28/2020    SCRIBE #1 NOTE: I, Risa Ramirez, am scribing for, and in the presence of,  Dr. Lefort. I have scribed the entire note.       History     Chief Complaint   Patient presents with    Vomiting     n/v, chest wall pain, hyperglycemia     The patient is a 54 y.o. female who presents to the ED with complaint of vomiting. It is unknown when the patient's symptoms began. She has associated chest pain and shortness of breath but denies cough, congestion, fever or chills. Her history is limited secondary to respiratory distress.    The patient has a past medical history of Allergic rhinitis, Diabetes mellitus, Pes planus, and Thyroid disease. She also has a past surgical history that includes Appendectomy; Hysterectomy; Foot surgery; and Hand surgery.          The history is provided by the patient. The history is limited by the condition of the patient.     Review of patient's allergies indicates:   Allergen Reactions    Bactrim [sulfamethoxazole-trimethoprim] Hives and Itching    Codeine Anaphylaxis     Past Medical History:   Diagnosis Date    Allergic rhinitis     Diabetes mellitus     type I    Pes planus     Thyroid disease     hashimoto's     Past Surgical History:   Procedure Laterality Date    APPENDECTOMY      FOOT SURGERY      HAND SURGERY      HYSTERECTOMY       Family History   Problem Relation Age of Onset    Atrial fibrillation Mother     Heart disease Mother     Diabetes Father     Diabetes Maternal Grandfather     Diabetes Paternal Grandfather      Social History     Tobacco Use    Smoking status: Never Smoker    Smokeless tobacco: Never Used   Substance Use Topics    Alcohol use: Yes     Frequency: 2-4 times a month     Drinks per session: 1 or 2     Binge frequency: Never     Comment: occasional wine    Drug use: Not on file     Review of Systems   Constitutional: Negative for chills and fever.   HENT: Negative for congestion, rhinorrhea and sore throat.     Eyes: Negative for redness and visual disturbance.   Respiratory: Negative for cough, shortness of breath and wheezing.    Cardiovascular: Negative for chest pain and palpitations.   Gastrointestinal: Positive for nausea and vomiting. Negative for abdominal pain and diarrhea.   Genitourinary: Negative for dysuria and hematuria.   Musculoskeletal: Negative for back pain, myalgias and neck pain.   Skin: Negative for rash.   Neurological: Negative for dizziness, weakness and light-headedness.   Psychiatric/Behavioral: Negative for confusion.       Physical Exam     Initial Vitals [03/28/20 1730]   BP Pulse Resp Temp SpO2   (!) 138/99 (!) 124 -- -- 100 %      MAP       --         Physical Exam    Nursing note and vitals reviewed.  Constitutional: She appears well-developed and well-nourished. She is not diaphoretic. No distress.   HENT:   Head: Normocephalic and atraumatic.   Right Ear: Tympanic membrane normal.   Left Ear: Tympanic membrane normal.   Mouth/Throat: Mucous membranes are dry.   Eyes: Conjunctivae and EOM are normal. Pupils are equal, round, and reactive to light.   Neck: Normal range of motion. Neck supple.   Cardiovascular: Regular rhythm and normal heart sounds. Tachycardia present.  Exam reveals no gallop and no friction rub.    No murmur heard.  Pulmonary/Chest: Breath sounds normal. Tachypnea noted. She has no wheezes. She has no rhonchi. She has no rales.   Abdominal: Soft. Bowel sounds are normal. There is no tenderness. There is no rebound and no guarding.   Musculoskeletal: Normal range of motion. She exhibits no edema or tenderness.   Lymphadenopathy:     She has no cervical adenopathy.   Neurological: She is alert and oriented to person, place, and time. She has normal strength.   Skin: Skin is warm and dry. Capillary refill takes less than 2 seconds. No rash noted.         ED Course   Critical Care  Date/Time: 3/28/2020 9:12 PM  Performed by: Guy J. Lefort, MD  Authorized by: Guy J. Lefort,  MD   Direct patient critical care time: 10 minutes  Additional history critical care time: 5 minutes  Ordering / reviewing critical care time: 5 minutes  Documentation critical care time: 5 minutes  Consulting other physicians critical care time: 5 minutes  Consult with family critical care time: 5 minutes  Total critical care time (exclusive of procedural time) : 35 minutes  Critical care time was exclusive of separately billable procedures and treating other patients and teaching time.  Critical care was necessary to treat or prevent imminent or life-threatening deterioration of the following conditions: respiratory failure.  Critical care was time spent personally by me on the following activities: development of treatment plan with patient or surrogate, discussions with consultants, interpretation of cardiac output measurements, evaluation of patient's response to treatment, examination of patient, obtaining history from patient or surrogate, ordering and performing treatments and interventions, ordering and review of laboratory studies, ordering and review of radiographic studies, re-evaluation of patient's condition and review of old charts.        Labs Reviewed   POCT GLUCOSE - Abnormal; Notable for the following components:       Result Value    POCT Glucose 478 (*)     All other components within normal limits     EKG Readings: (Independently Interpreted)   Sinus tachycardia with a rate of 115. Rightward axis. No ST changes. No STEMI       Imaging Results          CTA Chest Non Coronary (Final result)  Result time 03/29/20 01:16:44   Procedure changed from CT Chest Without Contrast     Final result by Petty Frazier MD (03/29/20 01:16:44)                 Impression:      1. No evidence of PE.  2. Mild dependent atelectatic/consolidative changes within the lower lobes.  3. Mild circumferential wall thickening involving the distal esophagus.  Correlate for clinical symptoms of  esophagitis.      Electronically signed by: Petty Frazier MD  Date:    03/29/2020  Time:    01:16             Narrative:    EXAMINATION:  CTA CHEST NON CORONARY    CLINICAL HISTORY:  Chest pain, acute, PE suspected, intermed prob, positive D-dimer;    TECHNIQUE:  Low dose axial images, sagittal and coronal reformations were obtained from the thoracic inlet to the lung bases following the IV administration of 100 mL of Omnipaque 350.  Contrast timing was optimized to evaluate the pulmonary arteries.  MIP images were performed.    COMPARISON:  None    FINDINGS:  Structures at the base of the neck are unremarkable.  Aorta is non-aneurysmal.  The heart is normal in size without pericardial effusion.  No intraluminal filling defects within the pulmonary arteries to suggest pulmonary thromboembolism.   There is no evidence of mediastinal, axillary, or hilar lymph node enlargement.  Mild circumferential wall thickening is seen involving the distal esophagus.    The trachea and bronchi are patent.  The lungs are symmetrically expanded.  Mild dependent atelectatic/consolidative changes are seen within the lower lobes.  Right middle lobe and upper lobes are relatively clear.  No evidence of pneumothorax or pleural effusion.    The visualized abdominal structures show no acute abnormalities.  Calcified granulomas are noted in the spleen.  Degenerative changes are seen in the midthoracic spine.  Extrathoracic soft tissues are unremarkable.                               X-Ray Chest AP Portable (Final result)  Result time 03/28/20 18:19:35    Final result by Benoit Sarmiento MD (03/28/20 18:19:35)                 Impression:      Medial left basilar few small patchy opacities that may represent subsegmental retrocardiac atelectasis; however, early aspiration or pneumonia not excluded in the proper clinical setting.      Electronically signed by: Benoit Sarmiento MD  Date:    03/28/2020  Time:    18:19             Narrative:     EXAMINATION:  XR CHEST AP PORTABLE    CLINICAL HISTORY:  hyperglycemia;    TECHNIQUE:  Single frontal view of the chest was performed.    COMPARISON:  None    FINDINGS:  Monitoring leads overlie the chest.    Patchy few small opacities project over the medial left lung base.  The lungs are otherwise well expanded without large consolidation, pleural effusion or pneumothorax.    The cardiac silhouette is normal in size. The hilar and mediastinal contours are unremarkable.    No acute osseous process seen.  PA and lateral views can be obtained.                                 Medical Decision Making:   Differential Diagnosis:   Differential Diagnosis includes, but is not limited to:  PE, MI/ACS, pneumothorax, pericardial effusion/tamonade, pneumonia, lung abscess, pericarditis/myocarditis, pleural effusion, lung mass, CHF exacerbation, asthma exacerbation, COPD exacerbation, aspirated/ingested foreign body, airway obstruction, CO poisoning, anemia, metabolic derangement, allergy/atopy, influenza, viral URI, viral syndrome.    Independently Interpreted Test(s):   I have ordered and independently interpreted EKG Reading(s) - see prior notes  Clinical Tests:   Lab Tests: Ordered and Reviewed  Radiological Study: Ordered and Reviewed  Medical Tests: Ordered and Reviewed  ED Management:  Insulin, iv abx, ivf, r/o covid  [facility] to admit.  Other:   I have discussed this case with another health care provider.                   ED Course as of Mar 30 0246   Sat Mar 28, 2020   2017 Consult: I consulted Jama-NP of [facility]ist team who will accept the patient for admission.    [SP]      ED Course User Index  [SP] Risa Ramirez                Clinical Impression:     1. Diabetic ketoacidosis without coma associated with type 2 diabetes mellitus    2. Hyperglycemia    3. Sepsis, due to unspecified organism, unspecified whether acute organ dysfunction present    4. Pneumonia due to infectious organism, unspecified  laterality, unspecified part of lung    5. Tachycardia    6. Diabetic ketoacidosis without coma associated with type 1 diabetes mellitus    7. Suspected 2019 Novel Coronavirus Infection    8. Chest pressure    9. Type 1 diabetes mellitus with hyperglycemia        Disposition:   Disposition: Admitted  Condition: Critical         Scribe attestation I, Dr. Guy Lefort, personally performed the services described in this documentation. All medical record entries made by the scribe were at my direction and in my presence. I have reviewed the chart and agree that the record reflects my personal performance and is accurate and complete. Guy Lefort, MD.  2:46 AM 03/30/2020                 Guy J. Lefort, MD  03/30/20 0247

## 2020-03-29 PROBLEM — K20.90 ESOPHAGITIS, ACUTE: Status: ACTIVE | Noted: 2020-03-29

## 2020-03-29 PROBLEM — E10.9 TYPE 1 DIABETES MELLITUS WITHOUT COMPLICATION: Status: RESOLVED | Noted: 2019-11-25 | Resolved: 2020-03-29

## 2020-03-29 PROBLEM — E06.3 HYPOTHYROIDISM DUE TO HASHIMOTO'S THYROIDITIS: Chronic | Status: ACTIVE | Noted: 2019-11-25

## 2020-03-29 PROBLEM — E87.5 HYPERKALEMIA: Status: RESOLVED | Noted: 2020-03-28 | Resolved: 2020-03-29

## 2020-03-29 PROBLEM — E11.10 DKA (DIABETIC KETOACIDOSIS): Status: RESOLVED | Noted: 2020-03-28 | Resolved: 2020-03-29

## 2020-03-29 PROBLEM — D72.829 LEUKOCYTOSIS: Status: RESOLVED | Noted: 2020-03-28 | Resolved: 2020-03-29

## 2020-03-29 PROBLEM — E03.8 HYPOTHYROIDISM DUE TO HASHIMOTO'S THYROIDITIS: Chronic | Status: ACTIVE | Noted: 2019-11-25

## 2020-03-29 PROBLEM — E87.20 METABOLIC ACIDOSIS: Status: RESOLVED | Noted: 2020-03-28 | Resolved: 2020-03-29

## 2020-03-29 PROBLEM — E10.10 DIABETIC KETOACIDOSIS WITHOUT COMA ASSOCIATED WITH TYPE 1 DIABETES MELLITUS: Status: ACTIVE | Noted: 2020-03-28

## 2020-03-29 PROBLEM — E87.20 LACTIC ACIDOSIS: Status: RESOLVED | Noted: 2020-03-28 | Resolved: 2020-03-29

## 2020-03-29 PROBLEM — R79.89 ELEVATED D-DIMER: Status: RESOLVED | Noted: 2020-03-28 | Resolved: 2020-03-29

## 2020-03-29 LAB
ALBUMIN SERPL BCP-MCNC: 3.4 G/DL (ref 3.5–5.2)
ALBUMIN SERPL BCP-MCNC: 3.5 G/DL (ref 3.5–5.2)
ALBUMIN SERPL BCP-MCNC: 3.9 G/DL (ref 3.5–5.2)
ALP SERPL-CCNC: 105 U/L (ref 55–135)
ALP SERPL-CCNC: 129 U/L (ref 55–135)
ALP SERPL-CCNC: 96 U/L (ref 55–135)
ALT SERPL W/O P-5'-P-CCNC: 15 U/L (ref 10–44)
ALT SERPL W/O P-5'-P-CCNC: 17 U/L (ref 10–44)
ALT SERPL W/O P-5'-P-CCNC: 19 U/L (ref 10–44)
ANION GAP SERPL CALC-SCNC: 16 MMOL/L (ref 8–16)
ANION GAP SERPL CALC-SCNC: 17 MMOL/L (ref 8–16)
ANION GAP SERPL CALC-SCNC: ABNORMAL MMOL/L (ref 8–16)
AST SERPL-CCNC: 17 U/L (ref 10–40)
AST SERPL-CCNC: 22 U/L (ref 10–40)
AST SERPL-CCNC: 22 U/L (ref 10–40)
BILIRUB SERPL-MCNC: 0.3 MG/DL (ref 0.1–1)
BILIRUB SERPL-MCNC: 0.6 MG/DL (ref 0.1–1)
BILIRUB SERPL-MCNC: 0.9 MG/DL (ref 0.1–1)
BUN SERPL-MCNC: 16 MG/DL (ref 6–20)
BUN SERPL-MCNC: 16 MG/DL (ref 6–20)
BUN SERPL-MCNC: 21 MG/DL (ref 6–20)
CALCIUM SERPL-MCNC: 8.3 MG/DL (ref 8.7–10.5)
CALCIUM SERPL-MCNC: 8.8 MG/DL (ref 8.7–10.5)
CALCIUM SERPL-MCNC: 9 MG/DL (ref 8.7–10.5)
CHLORIDE SERPL-SCNC: 107 MMOL/L (ref 95–110)
CHLORIDE SERPL-SCNC: 115 MMOL/L (ref 95–110)
CHLORIDE SERPL-SCNC: 115 MMOL/L (ref 95–110)
CO2 SERPL-SCNC: 15 MMOL/L (ref 23–29)
CO2 SERPL-SCNC: 9 MMOL/L (ref 23–29)
CO2 SERPL-SCNC: <5 MMOL/L (ref 23–29)
CREAT SERPL-MCNC: 1.2 MG/DL (ref 0.5–1.4)
CREAT SERPL-MCNC: 1.2 MG/DL (ref 0.5–1.4)
CREAT SERPL-MCNC: 1.4 MG/DL (ref 0.5–1.4)
CRP SERPL-MCNC: 56.3 MG/L (ref 0–8.2)
EST. GFR  (AFRICAN AMERICAN): 48 ML/MIN/1.73 M^2
EST. GFR  (AFRICAN AMERICAN): 58 ML/MIN/1.73 M^2
EST. GFR  (AFRICAN AMERICAN): 58 ML/MIN/1.73 M^2
EST. GFR  (NON AFRICAN AMERICAN): 42 ML/MIN/1.73 M^2
EST. GFR  (NON AFRICAN AMERICAN): 51 ML/MIN/1.73 M^2
EST. GFR  (NON AFRICAN AMERICAN): 51 ML/MIN/1.73 M^2
GLUCOSE SERPL-MCNC: 183 MG/DL (ref 70–110)
GLUCOSE SERPL-MCNC: 258 MG/DL (ref 70–110)
GLUCOSE SERPL-MCNC: 467 MG/DL (ref 70–110)
LACTATE SERPL-SCNC: 3 MMOL/L (ref 0.5–2.2)
LDH SERPL L TO P-CCNC: 308 U/L (ref 110–260)
POCT GLUCOSE: 158 MG/DL (ref 70–110)
POCT GLUCOSE: 159 MG/DL (ref 70–110)
POCT GLUCOSE: 175 MG/DL (ref 70–110)
POCT GLUCOSE: 191 MG/DL (ref 70–110)
POCT GLUCOSE: 197 MG/DL (ref 70–110)
POCT GLUCOSE: 200 MG/DL (ref 70–110)
POCT GLUCOSE: 214 MG/DL (ref 70–110)
POCT GLUCOSE: 214 MG/DL (ref 70–110)
POCT GLUCOSE: 216 MG/DL (ref 70–110)
POCT GLUCOSE: 220 MG/DL (ref 70–110)
POCT GLUCOSE: 250 MG/DL (ref 70–110)
POCT GLUCOSE: 265 MG/DL (ref 70–110)
POCT GLUCOSE: 315 MG/DL (ref 70–110)
POCT GLUCOSE: 316 MG/DL (ref 70–110)
POTASSIUM SERPL-SCNC: 3.7 MMOL/L (ref 3.5–5.1)
POTASSIUM SERPL-SCNC: 4.2 MMOL/L (ref 3.5–5.1)
POTASSIUM SERPL-SCNC: 4.7 MMOL/L (ref 3.5–5.1)
PROT SERPL-MCNC: 5.9 G/DL (ref 6–8.4)
PROT SERPL-MCNC: 6.5 G/DL (ref 6–8.4)
PROT SERPL-MCNC: 7.2 G/DL (ref 6–8.4)
SARS-COV-2 RNA RESP QL NAA+PROBE: NOT DETECTED
SODIUM SERPL-SCNC: 139 MMOL/L (ref 136–145)
SODIUM SERPL-SCNC: 140 MMOL/L (ref 136–145)
SODIUM SERPL-SCNC: 141 MMOL/L (ref 136–145)

## 2020-03-29 PROCEDURE — 83615 LACTATE (LD) (LDH) ENZYME: CPT

## 2020-03-29 PROCEDURE — 99232 SBSQ HOSP IP/OBS MODERATE 35: CPT | Mod: ,,, | Performed by: INTERNAL MEDICINE

## 2020-03-29 PROCEDURE — C9399 UNCLASSIFIED DRUGS OR BIOLOG: HCPCS | Performed by: FAMILY MEDICINE

## 2020-03-29 PROCEDURE — 80053 COMPREHEN METABOLIC PANEL: CPT | Mod: 91

## 2020-03-29 PROCEDURE — 94761 N-INVAS EAR/PLS OXIMETRY MLT: CPT

## 2020-03-29 PROCEDURE — 93005 ELECTROCARDIOGRAM TRACING: CPT

## 2020-03-29 PROCEDURE — 63600175 PHARM REV CODE 636 W HCPCS: Performed by: FAMILY MEDICINE

## 2020-03-29 PROCEDURE — 12000002 HC ACUTE/MED SURGE SEMI-PRIVATE ROOM

## 2020-03-29 PROCEDURE — 99232 PR SUBSEQUENT HOSPITAL CARE,LEVL II: ICD-10-PCS | Mod: ,,, | Performed by: INTERNAL MEDICINE

## 2020-03-29 PROCEDURE — 25000003 PHARM REV CODE 250: Performed by: NURSE PRACTITIONER

## 2020-03-29 PROCEDURE — 93010 ELECTROCARDIOGRAM REPORT: CPT | Mod: ,,, | Performed by: INTERNAL MEDICINE

## 2020-03-29 PROCEDURE — 86140 C-REACTIVE PROTEIN: CPT

## 2020-03-29 PROCEDURE — 25000003 PHARM REV CODE 250: Performed by: INTERNAL MEDICINE

## 2020-03-29 PROCEDURE — 63600175 PHARM REV CODE 636 W HCPCS: Performed by: NURSE PRACTITIONER

## 2020-03-29 PROCEDURE — 93010 EKG 12-LEAD: ICD-10-PCS | Mod: ,,, | Performed by: INTERNAL MEDICINE

## 2020-03-29 PROCEDURE — 25500020 PHARM REV CODE 255: Performed by: FAMILY MEDICINE

## 2020-03-29 RX ORDER — AZITHROMYCIN 250 MG/1
500 TABLET, FILM COATED ORAL ONCE
Status: COMPLETED | OUTPATIENT
Start: 2020-03-29 | End: 2020-03-29

## 2020-03-29 RX ORDER — ALBUTEROL SULFATE 90 UG/1
2 AEROSOL, METERED RESPIRATORY (INHALATION) EVERY 6 HOURS PRN
Status: DISCONTINUED | OUTPATIENT
Start: 2020-03-29 | End: 2020-03-30 | Stop reason: HOSPADM

## 2020-03-29 RX ORDER — AZITHROMYCIN 250 MG/1
250 TABLET, FILM COATED ORAL DAILY
Status: DISCONTINUED | OUTPATIENT
Start: 2020-03-30 | End: 2020-03-30 | Stop reason: HOSPADM

## 2020-03-29 RX ORDER — INSULIN ASPART 100 [IU]/ML
1-10 INJECTION, SOLUTION INTRAVENOUS; SUBCUTANEOUS
Status: DISCONTINUED | OUTPATIENT
Start: 2020-03-29 | End: 2020-03-30

## 2020-03-29 RX ORDER — PANTOPRAZOLE SODIUM 40 MG/1
40 TABLET, DELAYED RELEASE ORAL 2 TIMES DAILY
Status: DISCONTINUED | OUTPATIENT
Start: 2020-03-29 | End: 2020-03-30 | Stop reason: HOSPADM

## 2020-03-29 RX ORDER — LEVOTHYROXINE SODIUM 50 UG/1
100 TABLET ORAL DAILY
Status: DISCONTINUED | OUTPATIENT
Start: 2020-03-29 | End: 2020-03-30 | Stop reason: HOSPADM

## 2020-03-29 RX ORDER — VANCOMYCIN HCL IN 5 % DEXTROSE 1G/250ML
1000 PLASTIC BAG, INJECTION (ML) INTRAVENOUS
Status: DISCONTINUED | OUTPATIENT
Start: 2020-03-29 | End: 2020-03-30

## 2020-03-29 RX ORDER — HYDROXYCHLOROQUINE SULFATE 200 MG/1
400 TABLET, FILM COATED ORAL DAILY
Status: DISCONTINUED | OUTPATIENT
Start: 2020-03-31 | End: 2020-03-29

## 2020-03-29 RX ORDER — HYDROXYCHLOROQUINE SULFATE 200 MG/1
400 TABLET, FILM COATED ORAL 2 TIMES DAILY
Status: DISCONTINUED | OUTPATIENT
Start: 2020-03-29 | End: 2020-03-29

## 2020-03-29 RX ORDER — INDOMETHACIN 25 MG/1
50 CAPSULE ORAL
Status: DISCONTINUED | OUTPATIENT
Start: 2020-03-29 | End: 2020-03-29

## 2020-03-29 RX ADMIN — ACETAMINOPHEN 650 MG: 325 TABLET ORAL at 03:03

## 2020-03-29 RX ADMIN — AZITHROMYCIN 500 MG: 250 TABLET, FILM COATED ORAL at 03:03

## 2020-03-29 RX ADMIN — IOHEXOL 100 ML: 350 INJECTION, SOLUTION INTRAVENOUS at 01:03

## 2020-03-29 RX ADMIN — LEVOTHYROXINE SODIUM 100 MCG: 50 TABLET ORAL at 09:03

## 2020-03-29 RX ADMIN — ENOXAPARIN SODIUM 30 MG: 100 INJECTION SUBCUTANEOUS at 10:03

## 2020-03-29 RX ADMIN — PIPERACILLIN SODIUM AND TAZOBACTAM SODIUM 4.5 G: 4; .5 INJECTION, POWDER, LYOPHILIZED, FOR SOLUTION INTRAVENOUS at 03:03

## 2020-03-29 RX ADMIN — SODIUM BICARBONATE: 84 INJECTION, SOLUTION INTRAVENOUS at 01:03

## 2020-03-29 RX ADMIN — PANTOPRAZOLE SODIUM 40 MG: 40 TABLET, DELAYED RELEASE ORAL at 07:03

## 2020-03-29 RX ADMIN — VANCOMYCIN HYDROCHLORIDE 1000 MG: 1 INJECTION, POWDER, LYOPHILIZED, FOR SOLUTION INTRAVENOUS at 08:03

## 2020-03-29 RX ADMIN — ACETAMINOPHEN 650 MG: 325 TABLET ORAL at 05:03

## 2020-03-29 RX ADMIN — SODIUM CHLORIDE: 0.9 INJECTION, SOLUTION INTRAVENOUS at 06:03

## 2020-03-29 RX ADMIN — PIPERACILLIN SODIUM AND TAZOBACTAM SODIUM 4.5 G: 4; .5 INJECTION, POWDER, LYOPHILIZED, FOR SOLUTION INTRAVENOUS at 07:03

## 2020-03-29 RX ADMIN — PIPERACILLIN SODIUM AND TAZOBACTAM SODIUM 4.5 G: 4; .5 INJECTION, POWDER, LYOPHILIZED, FOR SOLUTION INTRAVENOUS at 10:03

## 2020-03-29 RX ADMIN — INSULIN DETEMIR 10 UNITS: 100 INJECTION, SOLUTION SUBCUTANEOUS at 10:03

## 2020-03-29 RX ADMIN — SODIUM CHLORIDE: 0.9 INJECTION, SOLUTION INTRAVENOUS at 08:03

## 2020-03-29 RX ADMIN — INSULIN ASPART 4 UNITS: 100 INJECTION, SOLUTION INTRAVENOUS; SUBCUTANEOUS at 10:03

## 2020-03-29 NOTE — ASSESSMENT & PLAN NOTE
Suspected Covid-19 Virus Infection    Pneumonia     Covid-19 test collected, pending results  Influenza: negative  Droplet/contact/airborne precautions  Continuous Cardiac Monitoring   Blood Cultures: pending  Reviewed infectious disease recommendations   - Continue Vanc/Zosyn   - add Azithromycin   - initiate hydroxychloroquine

## 2020-03-29 NOTE — PROGRESS NOTES
Pharmacokinetic Initial Assessment: IV Vancomycin    Assessment/Plan:    Initiate intravenous vancomycin with loading dose of 2000 mg once followed by a maintenance dose of vancomycin 1000mg IV every 24 hours  Desired empiric serum trough concentration is 10 to 15 mcg/mL  Draw vancomycin trough level 30 min prior to third dose on 3-30 at approximately 19:00   Pharmacy will continue to follow and monitor vancomycin.      Please contact pharmacy at extension [phone] with any questions regarding this assessment.     Thank you for the consult,   Swapnil Dias       Patient brief summary:  Halle Spence is a 54 y.o. female initiated on antimicrobial therapy with IV Vancomycin for treatment of suspected lower respiratory infection    Drug Allergies:   Review of patient's allergies indicates:   Allergen Reactions    Bactrim [sulfamethoxazole-trimethoprim] Hives and Itching    Codeine Anaphylaxis       Actual Body Weight:   68.5 kg    Renal Function:   Estimated Creatinine Clearance: 43.6 mL/min (based on SCr of 1.4 mg/dL).,     Dialysis Method (if applicable):  N/A    CBC (last 72 hours):  Recent Labs   Lab Result Units 03/28/20  1758   WBC K/uL 27.63*   Hemoglobin g/dL 16.2*   Hematocrit % 52.5*   Platelets K/uL 346   Gran% % 85.5*   Lymph% % 7.7*   Mono% % 4.7   Eosinophil% % 0.1   Basophil% % 0.4   Differential Method  Automated       Metabolic Panel (last 72 hours):  Recent Labs   Lab Result Units 03/28/20  1911 03/28/20  1925 03/28/20  2223   Sodium mmol/L 144  --  141   Potassium mmol/L 5.4*  --  4.7   Chloride mmol/L 113*  --  115*   CO2 mmol/L <5*  --  <5*   Glucose mg/dL 545*  --  467*   Glucose, UA   --  3+*  --    BUN, Bld mg/dL 25*  --  21*   Creatinine mg/dL 1.5*  --  1.4   Albumin g/dL 4.0  --  3.9   Total Bilirubin mg/dL 0.2  --  0.3   Alkaline Phosphatase U/L 138*  --  129   AST U/L 20  --  22   ALT U/L 19  --  19   Magnesium mg/dL 2.6  --   --        Drug levels (last 3 results):  No results for  input(s): VANCOMYCINRA, VANCOMYCINPE, VANCOMYCINTR in the last 72 hours.    Microbiologic Results:  Microbiology Results (last 7 days)       Procedure Component Value Units Date/Time    Blood culture #1 [[id number]] Collected:  03/28/20 1735    Order Status:  Sent Specimen:  Blood from Peripheral, Antecubital, Left Updated:  03/28/20 2154    Blood culture #2 [[id number]] Collected:  03/28/20 1800    Order Status:  Sent Specimen:  Blood from Peripheral, Antecubital, Left Updated:  03/28/20 2154    Influenza A & B by Molecular [[id number]] Collected:  03/28/20 1815    Order Status:  Completed Specimen:  Nasopharyngeal Swab Updated:  03/28/20 1855     Influenza A, Molecular Negative     Influenza B, Molecular Negative     Flu A & B Source Nasal swab

## 2020-03-29 NOTE — ED NOTES
Assumed care of patient from JORDEN Bond. Pt resting on stretcher.    Cervical and Thoracic x-rays orders are needed for our mutual patient. Dr. Gonzales is unable to order the X-rays due to medicare bi laws.     Thank you for your assistance and please contact our office if you need anything further.

## 2020-03-29 NOTE — ASSESSMENT & PLAN NOTE
DKA (diabetic ketoacidosis)  Type 1 diabetes mellitus without complication  Metobolic acidosis  Lactic acidosis  Hyperkalemia    Blood Glucose: 545  HgbA1C pending  Anion Gap: elevated unable to calculate, repeat pending  Bicarb: 5  Beta-Hydroxybutyrate: elevated  urinary ketones: present  BMP q 6hours  Insulin drip   Bicarb drip  NS IVFs  glucose q 1 hour  Moderate Sliding Scale  Continuous Cardiac Monitor  Seizure precautions  NPO  Monitor  Repeat Lactic and CMP pending

## 2020-03-29 NOTE — ED NOTES
Pt provided sugar free jello and pudding. Pt provided bedpan and had an output of 200ml of urine. Dr. Jimenez at bedside- states she will call  with an update. Pt denies needing anything at this time, will continue to monitor. 18 L AC PIV infiltrated. IV removed and NACL infusing through 22 g r wrist.

## 2020-03-29 NOTE — CONSULTS
[facility] Consult     Primary Team: Essence Saleem*  Consultant Attending: Dr. Eva PALMER  Date of Admit: 3/28/2020    Reason for Consult     COVID 19 rule out & hydroxychlorquine recommendations     History of Present Illness     Per admit H&P: Patient is a 55 yo F with PMH DM1, allergic rhinitis, thyroid disease who presented with chief complaint of nausea and vomiting for unknown duration. Patient reportedly endorsed associated chest pain and SOB but denied cough, fevers, chills, changes in bowel or bladder habits. Afebrile in ED but tachycardic and tachypneic. Lab work with elevated WBC count, elevated lactate, elevated ferritin, elevated LDH and CRP, and CMP consistent with DKA. CT chest with atelectatic/consolidative changes in lower lobes. ID consulted for further assistance.    Review of Systems (positives in bold):  ROS  Defer to primary team ROS due to COVID19 testing    Allergies:  Review of patient's allergies indicates:   Allergen Reactions    Bactrim [sulfamethoxazole-trimethoprim] Hives and Itching    Codeine Anaphylaxis       Medications:   In-Hospital Scheduled Medications:   enoxaparin  30 mg Subcutaneous Daily    levothyroxine  100 mcg Oral Daily    piperacillin-tazobactam (ZOSYN) IVPB  4.5 g Intravenous Q8H    vancomycin in dextrose 5 %  1,000 mg Intravenous Q24H      In-Hospital PRN Medications:  acetaminophen, albuterol, dextrose 10 % in water (D10W), dextrose 10 % in water (D10W), Dextrose 10% Bolus, Dextrose 10% Bolus, insulin aspart U-100, melatonin, ondansetron, sodium chloride 0.9%      Past Medical History:  Past Medical History:   Diagnosis Date    Allergic rhinitis     Diabetes mellitus     type I    Pes planus     Thyroid disease     hashimoto's     Past Surgical History/ObGyn Hx if gender appropriate:  Past Surgical History:   Procedure Laterality Date    APPENDECTOMY      FOOT SURGERY      HAND SURGERY      HYSTERECTOMY       Family  History:  Family History   Problem Relation Age of Onset    Atrial fibrillation Mother     Heart disease Mother     Diabetes Father     Diabetes Maternal Grandfather     Diabetes Paternal Grandfather      Social History:  Social History     Tobacco Use    Smoking status: Never Smoker    Smokeless tobacco: Never Used   Substance Use Topics    Alcohol use: Yes     Frequency: 2-4 times a month     Drinks per session: 1 or 2     Binge frequency: Never     Comment: occasional wine    Drug use: Not on file         Objective   Last 24 Hour Vital Signs:  BP  Min: 97/60  Max: 162/79  Temp  Av.4 °F (36.3 °C)  Min: 96.5 °F (35.8 °C)  Max: 98.4 °F (36.9 °C)  Pulse  Av  Min: 77  Max: 124  Resp  Av.9  Min: 23  Max: 32  SpO2  Av.7 %  Min: 99 %  Max: 100 %  Weight  Av.5 kg (151 lb)  Min: 68.5 kg (151 lb)  Max: 68.5 kg (151 lb)        Physical Examination:  Physical Exam  Defer to primary team due to COVID19 testing    Laboratory:  CBC:   Lab Results   Component Value Date    WBC 27.63 (H) 2020    HGB 16.2 (H) 2020     2020    MCV 99 (H) 2020    RDW 12.9 2020       Estimated Creatinine Clearance: 50.9 mL/min (based on SCr of 1.2 mg/dL).    BUN/Cr 25/1.5  D-dimer 1.13  Trop 0.006  BNP 44  Lactate 6.5 -> 3  Ferritin 530  CRP 56.3      Assessment     Per admit H&P:  Halle Spence is a 54 y.o. female with PMH DM1, allergic rhinitis, thyroid disease here for DKA, currently being ruled out for COVID-19.     Recommendations   Pneumonia Secondary Suspected COVID 19 Rule Out  - Date of admission: 3/28  - Upon presentation, 97.6F, 124 HR , 26 RR , spo2 100% on RA  - Physical exam: per H&P, tachypneic with diminished breath sounds  - lab work as above  - following blood cultures from 3/28  - Chest x-ray on 3/28: small patchy opacities in medial left base    - low suspicion for COVID-19, however would start hydroxychloroquine early (400 mg BID day 1 followed by  400 mg daily days 2-5) while looking for source of infection as trigger for DKA  - on vanc and zosyn for antibiotics; would add azithromycin and repeat CXR tomorrow  - monitor QTc daily while on hydroxychloroquine and azithromycin  - would trend WBC as initial count was markedly elevated  - follow up COVID 19 PCR from 3/28  - will not see patient daily. Call ID as needed.        Thank you for this consult. We will follow.      Brendan Pritchett MD  [facility]

## 2020-03-29 NOTE — HOSPITAL COURSE
3/29/20- Anion gap closed, patient transitioned to SQ insulin and NS IV fluids. Seen by ID; on plaquenil for suspected COVID infection; also on empiric ABX for bacterial PNA.   3/30/20- patient went back into DKA overnight, restarting Insulin infusion protocol, upgrading to ICU level of care. COVID test returned NEG but high clinical suspicion and will continue to treat accordingly

## 2020-03-29 NOTE — ED NOTES
Pt provided hospital breakfast tray. Pt sitting up in bed eating. Pt denies needing anything at this time. Call light within reach, will continue to monitor.

## 2020-03-29 NOTE — PROGRESS NOTES
[facility] Hospital Medicine  Progress Note    Patient Name: Halle Spence  MRN: [id number]  Patient Class: IP- Inpatient   Admission Date: 3/28/2020  Length of Stay: 1 days  Attending Physician: Michelle Jimenez MD  Primary Care Provider: Vilma Milan MD        Subjective:     Principal Problem:Diabetic ketoacidosis without coma associated with type 1 diabetes mellitus        HPI:  54 y.o. female with past medical history of DM type 1 on insulin pump, who presents to the ED with complaint of nausea and vomiting.   Work up revealed DKA.  Patient also reported headache, cough, SOB; chest x-ray concerning for pneumonia.   No confirmed COVID contacts, has elderly mother who resided in a local nursing home known to have a COVID outbreak, and patient brought mother home to live with her one week ago.   Patient admitted for DKA, pneumonia, and suspected covid infection.    Overview/Hospital Course:  3/29/20- Anion gap closed, patient transitioned to SQ insulin and NS IV fluids. Seen by ID; on plaquenil for suspected COVID infection; also on empiric ABX for bacterial PNA.     Interval History:   Patient seen in ED.  Tearful, anxious.  Unable to tolerate oral intake due to burning pain in throat and chest- she tells me this is the result of recent vomiting.  ID consult note reviewed.      Review of Systems   Constitutional: Positive for appetite change and fatigue. Negative for chills, diaphoresis and fever.   HENT: Negative for congestion.    Eyes: Negative for redness.   Respiratory: Positive for cough, chest tightness and shortness of breath. Negative for wheezing.    Cardiovascular: Negative for leg swelling.   Gastrointestinal: Positive for nausea and vomiting.   Endocrine: Positive for polydipsia.   Genitourinary: Negative for difficulty urinating and dysuria.   Musculoskeletal: Negative for arthralgias and myalgias.   Skin: Negative for rash.   Neurological: Negative for dizziness.    Psychiatric/Behavioral: The patient is nervous/anxious.      Objective:     Vital Signs (Most Recent):  Temp: 98.8 °F (37.1 °C) (03/29/20 1411)  Pulse: 78 (03/29/20 1502)  Resp: 16 (03/29/20 1136)  BP: (!) 103/54 (03/29/20 1502)  SpO2: 100 % (03/29/20 1502) Vital Signs (24h Range):  Temp:  [96.5 °F (35.8 °C)-98.8 °F (37.1 °C)] 98.8 °F (37.1 °C)  Pulse:  [] 78  Resp:  [16-32] 16  SpO2:  [99 %-100 %] 100 %  BP: ()/(53-99) 103/54     Weight: 68.5 kg (151 lb)  Body mass index is 25.13 kg/m².    Intake/Output Summary (Last 24 hours) at 3/29/2020 1658  Last data filed at 3/29/2020 1532  Gross per 24 hour   Intake 4268.12 ml   Output 2175 ml   Net 2093.12 ml      Physical Exam   Constitutional: She is oriented to person, place, and time. She appears well-developed and well-nourished. No distress.   HENT:   Head: Normocephalic.   Eyes: Conjunctivae are normal.   Neck: No JVD present.   Cardiovascular: Normal rate and regular rhythm.   No murmur heard.  Pulmonary/Chest: Effort normal and breath sounds normal. No respiratory distress.   Abdominal: Soft. Bowel sounds are normal. She exhibits no distension.   Musculoskeletal: She exhibits no edema.   Neurological: She is alert and oriented to person, place, and time.   Skin: Skin is warm and dry.   Psychiatric:   + anxious        Significant Labs:   Results for MICHAEL THOMPSON (MRN [id number]) as of 3/29/2020 17:10   Ref. Range 3/29/2020 13:37   Anion Gap Latest Ref Range: 8 - 16 mmol/L 17 (H)   Results for MICHAEL THOMPSON (MRN [id number]) as of 3/29/2020 17:10   Ref. Range 3/29/2020 13:37   BUN, Bld Latest Ref Range: 6 - 20 mg/dL 16   Creatinine Latest Ref Range: 0.5 - 1.4 mg/dL 1.2   Results for MICHAEL THOMPSON (MRN [id number]) as of 3/29/2020 17:10   Ref. Range 3/28/2020 22:23 3/29/2020 01:37   LD Latest Ref Range: 110 - 260 U/L  308 (H)   Lactate, [place] Latest Ref Range: 0.5 - 2.2 mmol/L 3.0 (H)        Significant Imaging:     CT Chest 3/29/20:  1. No  evidence of PE.  2. Mild dependent atelectatic/consolidative changes within the lower lobes.  3. Mild circumferential wall thickening involving the distal esophagus.  Correlate for clinical symptoms of esophagitis.      Assessment/Plan:      * Diabetic ketoacidosis without coma associated with type 1 diabetes mellitus  DKA (diabetic ketoacidosis)  Type 1 diabetes mellitus without complication  Metobolic acidosis  Lactic acidosis  Hyperkalemia    - resolved, transitioned to SQ insulin and NS infusion  - monitor labs  - oral intake as tolerated    Esophagitis, acute  - likely secondary to vomiting  - PPI twice daily      Pneumonia  Suspected Covid-19 Virus Infection    Pneumonia     Covid-19 test collected, pending results  Influenza: negative  Droplet/contact/airborne precautions  Continuous Cardiac Monitoring   Blood Cultures: pending  Reviewed infectious disease recommendations   - Continue Vanc/Zosyn   - add Azithromycin   - initiate hydroxychloroquine     Hypothyroidism due to Hashimoto's thyroiditis  Continue Synthroid      VTE Risk Mitigation (From admission, onward)         Ordered     enoxaparin injection 30 mg  Daily      03/28/20 2052     IP VTE HIGH RISK PATIENT  Once      03/28/20 2052                      Michelle Jimenez MD  Department of Hospital Medicine   [facility]

## 2020-03-29 NOTE — ED NOTES
Insulin pump turned off and disconnected from patient, placed in personal belongings bag with clothing.

## 2020-03-29 NOTE — H&P
[facility] Hospital Medicine  History & Physical    Patient Name: Halle Spence  MRN: [id number]  Admission Date: 3/28/2020  Attending Physician: Essence Saleem*   Primary Care Provider: Vilma Milan MD         Patient information was obtained from patient, past medical records and ER records.     Subjective:     Principal Problem:<principal problem not specified>    Chief Complaint:   Chief Complaint   Patient presents with    Vomiting     n/v, chest wall pain, hyperglycemia        HPI: 54 y.o. female with past medical history of DM type 1 on insulin pump, allergic rhinitis, thyroid disease who presents to the ED with complaint of nausea and vomiting. It is unknown when the patient's symptoms began. She has associated chest pain and shortness of breath but denies cough, congestion, fever or chills change in bladder habits or change in bowel habits. ED findings: WBCs 27.63, H/H 16.2/52.5, ferritin 530, elevated D-Dimer, potassium 5.4, bicarb 5, creatinine 1.5, glucose 545, alk phos 138, lipase 72, flu swab negative, urine positive for ketones, chest x-ray concerning for pneumonia.  Patient admitted for DKA, pneumonia and suspected covid infection.    Past Medical History:   Diagnosis Date    Allergic rhinitis     Diabetes mellitus     type I    Pes planus     Thyroid disease     hashimoto's       Past Surgical History:   Procedure Laterality Date    APPENDECTOMY      FOOT SURGERY      HAND SURGERY      HYSTERECTOMY         Review of patient's allergies indicates:   Allergen Reactions    Bactrim [sulfamethoxazole-trimethoprim] Hives and Itching    Codeine Anaphylaxis       No current facility-administered medications on file prior to encounter.      Current Outpatient Medications on File Prior to Encounter   Medication Sig    HUMALOG U-100 INSULIN 100 unit/mL injection USE WITH INSULIN PUMP MAX  UNITS PER DAY    INVOKANA 100 mg Tab TK 1 T PO QD     levothyroxine (SYNTHROID) 100 MCG tablet Take 100 mcg by mouth once daily.    montelukast (SINGULAIR) 10 mg tablet Take 10 mg by mouth every evening.    [DISCONTINUED] amoxicillin-clavulanate 875-125mg (AUGMENTIN) 875-125 mg per tablet TK 1 T PO Q 12 H FOR 7 DAYS    [DISCONTINUED] CIPRODEX 0.3-0.1 % DrpS PLACE 4 DROP INTO BOTH EARS BID     Family History     Problem Relation (Age of Onset)    Atrial fibrillation Mother    Diabetes Father, Maternal Grandfather, Paternal Grandfather    Heart disease Mother        Tobacco Use    Smoking status: Never Smoker    Smokeless tobacco: Never Used   Substance and Sexual Activity    Alcohol use: Yes     Frequency: 2-4 times a month     Drinks per session: 1 or 2     Binge frequency: Never     Comment: occasional wine    Drug use: Not on file    Sexual activity: Not on file     Review of Systems   Constitutional: Negative for chills and fever.   HENT: Negative for congestion, postnasal drip and rhinorrhea.    Eyes: Negative for visual disturbance.   Respiratory: Positive for chest tightness and shortness of breath.    Cardiovascular: Negative for chest pain and palpitations.   Gastrointestinal: Positive for nausea and vomiting. Negative for abdominal distention and abdominal pain.   Genitourinary: Negative for difficulty urinating.   Musculoskeletal: Negative for arthralgias and myalgias.   Skin: Negative for color change and wound.   Neurological: Negative for weakness and headaches.   Hematological: Does not bruise/bleed easily.   Psychiatric/Behavioral: Negative for agitation.     Objective:     Vital Signs (Most Recent):  Temp: 96.5 °F (35.8 °C) (03/28/20 2202)  Pulse: (!) 114 (03/28/20 2302)  Resp: (!) 28 (03/28/20 2302)  BP: 119/65 (03/28/20 2302)  SpO2: 100 % (03/28/20 2302) Vital Signs (24h Range):  Temp:  [96.5 °F (35.8 °C)-97.6 °F (36.4 °C)] 96.5 °F (35.8 °C)  Pulse:  [114-124] 114  Resp:  [26-32] 28  SpO2:  [100 %] 100 %  BP: (119-162)/(63-99) 119/65      Weight: 68.5 kg (151 lb)  Body mass index is 25.13 kg/m².    Physical Exam   Constitutional: She is oriented to person, place, and time. She appears well-developed and well-nourished.   HENT:   Head: Normocephalic and atraumatic.   Eyes: Pupils are equal, round, and reactive to light. EOM are normal.   Neck: Normal range of motion. Neck supple.   Cardiovascular: Tachycardia present.   No murmur heard.  Pulmonary/Chest: Tachypnea noted.   Diminished breath sounds   Abdominal: Soft. Bowel sounds are normal.   Musculoskeletal: She exhibits no deformity.   Neurological: She is alert and oriented to person, place, and time.   Skin: Skin is warm and dry.   Psychiatric: She has a normal mood and affect.         CRANIAL NERVES     CN III, IV, VI   Pupils are equal, round, and reactive to light.  Extraocular motions are normal.        Significant Labs:   A1C:   Recent Labs   Lab 11/25/19  0940   HGBA1C 7.8*     Bilirubin:   Recent Labs   Lab 03/28/20 1911   BILITOT 0.2     BMP:   Recent Labs   Lab 03/28/20 1911   *      K 5.4*   *   CO2 <5*   BUN 25*   CREATININE 1.5*   CALCIUM 8.8   MG 2.6     CBC:   Recent Labs   Lab 03/28/20 1758   WBC 27.63*   HGB 16.2*   HCT 52.5*        CMP:   Recent Labs   Lab 03/28/20 1911      K 5.4*   *   CO2 <5*   *   BUN 25*   CREATININE 1.5*   CALCIUM 8.8   PROT 7.2   ALBUMIN 4.0   BILITOT 0.2   ALKPHOS 138*   AST 20   ALT 19   ANIONGAP Unable to calculate   EGFRNONAA 39*     Cardiac Markers:   Recent Labs   Lab 03/28/20  1758   BNP 44     Coagulation:   Recent Labs   Lab 03/28/20  1758   INR 0.9     Lactic Acid:   Recent Labs   Lab 03/28/20 1758   LACTATE 6.5*     Lipase:   Recent Labs   Lab 03/28/20 1911   LIPASE 72*     Magnesium:   Recent Labs   Lab 03/28/20 1911   MG 2.6     Troponin:   Recent Labs   Lab 03/28/20  1758   TROPONINI 0.006     TSH:   Recent Labs   Lab 11/25/19  0940   TSH 1.104     Urine Studies:   Recent Labs   Lab  03/28/20 1925   COLORU Yellow   APPEARANCEUA Clear   PHUR 6.0   SPECGRAV >=1.030*   PROTEINUA 1+*   GLUCUA 3+*   KETONESU 3+*   BILIRUBINUA Negative   OCCULTUA 2+*   NITRITE Negative   UROBILINOGEN Negative   LEUKOCYTESUR Negative   RBCUA 0   WBCUA 1   BACTERIA None   HYALINECASTS 0       Significant Imaging: I have reviewed all pertinent imaging results/findings within the past 24 hours.    Assessment/Plan:     Elevated d-dimer  Patient complained of SOB and chest pain on admission  CT of chest ordered to r/o PE      Pneumonia  Suspected Covid-19 Virus Infection    Leukocytosis          Covid-19 test collected, pending results  Influenza: negative  Droplet/contact/airborne precautions  Continuous Cardiac Monitoring   Blood Cultures: pending  Urine: negative for UTI  Chest x-ray: concerning for pneumonia  Oxygen prn  Albuterol inhaler prn  Antipyretics prn  Antiemetics prn  IVFs   Consult infectious disease  Continue Vanc and Zosyn    Diabetic ketoacidosis without coma associated with type 2 diabetes mellitus  DKA (diabetic ketoacidosis)  Type 1 diabetes mellitus without complication  Metobolic acidosis  Lactic acidosis  Hyperkalemia    Blood Glucose: 545  HgbA1C pending  Anion Gap: elevated unable to calculate, repeat pending  Bicarb: 5  Beta-Hydroxybutyrate: elevated  urinary ketones: present  BMP q 6hours  Insulin drip   Bicarb drip  NS IVFs  glucose q 1 hour  Moderate Sliding Scale  Continuous Cardiac Monitor  Seizure precautions  NPO  Monitor  Repeat Lactic and CMP pending    Hypothyroidism due to Hashimoto's thyroiditis    Continue Synthroid      VTE Risk Mitigation (From admission, onward)         Ordered     enoxaparin injection 30 mg  Daily      03/28/20 2052     IP VTE HIGH RISK PATIENT  Once      03/28/20 2052                   Bijal Yun NP  Department of Hospital Medicine   [facility]

## 2020-03-29 NOTE — SUBJECTIVE & OBJECTIVE
Interval History:   Patient seen in ED.  Tearful, anxious.  Unable to tolerate oral intake due to burning pain in throat and chest- she tells me this is the result of recent vomiting.  ID consult note reviewed.      Review of Systems   Constitutional: Positive for appetite change and fatigue. Negative for chills, diaphoresis and fever.   HENT: Negative for congestion.    Eyes: Negative for redness.   Respiratory: Positive for cough, chest tightness and shortness of breath. Negative for wheezing.    Cardiovascular: Negative for leg swelling.   Gastrointestinal: Positive for nausea and vomiting.   Endocrine: Positive for polydipsia.   Genitourinary: Negative for difficulty urinating and dysuria.   Musculoskeletal: Negative for arthralgias and myalgias.   Skin: Negative for rash.   Neurological: Negative for dizziness.   Psychiatric/Behavioral: The patient is nervous/anxious.      Objective:     Vital Signs (Most Recent):  Temp: 98.8 °F (37.1 °C) (03/29/20 1411)  Pulse: 78 (03/29/20 1502)  Resp: 16 (03/29/20 1136)  BP: (!) 103/54 (03/29/20 1502)  SpO2: 100 % (03/29/20 1502) Vital Signs (24h Range):  Temp:  [96.5 °F (35.8 °C)-98.8 °F (37.1 °C)] 98.8 °F (37.1 °C)  Pulse:  [] 78  Resp:  [16-32] 16  SpO2:  [99 %-100 %] 100 %  BP: ()/(53-99) 103/54     Weight: 68.5 kg (151 lb)  Body mass index is 25.13 kg/m².    Intake/Output Summary (Last 24 hours) at 3/29/2020 1658  Last data filed at 3/29/2020 1532  Gross per 24 hour   Intake 4268.12 ml   Output 2175 ml   Net 2093.12 ml      Physical Exam   Constitutional: She is oriented to person, place, and time. She appears well-developed and well-nourished. No distress.   HENT:   Head: Normocephalic.   Eyes: Conjunctivae are normal.   Neck: No JVD present.   Cardiovascular: Normal rate and regular rhythm.   No murmur heard.  Pulmonary/Chest: Effort normal and breath sounds normal. No respiratory distress.   Abdominal: Soft. Bowel sounds are normal. She exhibits no  distension.   Musculoskeletal: She exhibits no edema.   Neurological: She is alert and oriented to person, place, and time.   Skin: Skin is warm and dry.   Psychiatric:   + anxious        Significant Labs:   Results for MICHAEL THOMPSON (MRN [id number]) as of 3/29/2020 17:10   Ref. Range 3/29/2020 13:37   Anion Gap Latest Ref Range: 8 - 16 mmol/L 17 (H)   Results for MICHAEL THOMPSON (MRN [id number]) as of 3/29/2020 17:10   Ref. Range 3/29/2020 13:37   BUN, Bld Latest Ref Range: 6 - 20 mg/dL 16   Creatinine Latest Ref Range: 0.5 - 1.4 mg/dL 1.2   Results for MICHAEL THOMPSON (MRN [id number]) as of 3/29/2020 17:10   Ref. Range 3/28/2020 22:23 3/29/2020 01:37   LD Latest Ref Range: 110 - 260 U/L  308 (H)   Lactate, [place] Latest Ref Range: 0.5 - 2.2 mmol/L 3.0 (H)        Significant Imaging:     CT Chest 3/29/20:  1. No evidence of PE.  2. Mild dependent atelectatic/consolidative changes within the lower lobes.  3. Mild circumferential wall thickening involving the distal esophagus.  Correlate for clinical symptoms of esophagitis.

## 2020-03-29 NOTE — ASSESSMENT & PLAN NOTE
DKA (diabetic ketoacidosis)  Type 1 diabetes mellitus without complication  Metobolic acidosis  Lactic acidosis  Hyperkalemia    - resolved, transitioned to SQ insulin and NS infusion  - monitor labs  - oral intake as tolerated

## 2020-03-29 NOTE — ASSESSMENT & PLAN NOTE
Suspected Covid-19 Virus Infection    Leukocytosis          Covid-19 test collected, pending results  Influenza: negative  Droplet/contact/airborne precautions  Continuous Cardiac Monitoring   Blood Cultures: pending  Urine: negative for UTI  Chest x-ray: concerning for pneumonia  Oxygen prn  Albuterol inhaler prn  Antipyretics prn  Antiemetics prn  IVFs   Consult infectious disease  Continue Vanc and Zosyn

## 2020-03-29 NOTE — HPI
54 y.o. female with past medical history of DM type 1 on insulin pump, who presents to the ED with complaint of nausea and vomiting.   Work up revealed DKA.  Patient also reported headache, cough, SOB; chest x-ray concerning for pneumonia.   No confirmed COVID contacts, has elderly mother who resided in a local nursing home known to have a COVID outbreak, and patient brought mother home to live with her one week ago.   Patient admitted for DKA, pneumonia, and suspected covid infection.

## 2020-03-29 NOTE — ED NOTES
Spoke to Dr. Morelos and informed of patient's most recent CMP and CBG of 159. Anion gap is closed and RN recommended that patient be down graded to telemetry. Per Dr. Morelos, give levemir 10units and feed patient at this time. 1hr after pt receiving levemir, discontinue insulin infusion. Per Dr. Morelos she wants the insulin infusion to continue at 1.5units/hr.

## 2020-03-29 NOTE — SUBJECTIVE & OBJECTIVE
Past Medical History:   Diagnosis Date    Allergic rhinitis     Diabetes mellitus     type I    Pes planus     Thyroid disease     hashimoto's       Past Surgical History:   Procedure Laterality Date    APPENDECTOMY      FOOT SURGERY      HAND SURGERY      HYSTERECTOMY         Review of patient's allergies indicates:   Allergen Reactions    Bactrim [sulfamethoxazole-trimethoprim] Hives and Itching    Codeine Anaphylaxis       No current facility-administered medications on file prior to encounter.      Current Outpatient Medications on File Prior to Encounter   Medication Sig    HUMALOG U-100 INSULIN 100 unit/mL injection USE WITH INSULIN PUMP MAX  UNITS PER DAY    INVOKANA 100 mg Tab TK 1 T PO QD    levothyroxine (SYNTHROID) 100 MCG tablet Take 100 mcg by mouth once daily.    montelukast (SINGULAIR) 10 mg tablet Take 10 mg by mouth every evening.    [DISCONTINUED] amoxicillin-clavulanate 875-125mg (AUGMENTIN) 875-125 mg per tablet TK 1 T PO Q 12 H FOR 7 DAYS    [DISCONTINUED] CIPRODEX 0.3-0.1 % DrpS PLACE 4 DROP INTO BOTH EARS BID     Family History     Problem Relation (Age of Onset)    Atrial fibrillation Mother    Diabetes Father, Maternal Grandfather, Paternal Grandfather    Heart disease Mother        Tobacco Use    Smoking status: Never Smoker    Smokeless tobacco: Never Used   Substance and Sexual Activity    Alcohol use: Yes     Frequency: 2-4 times a month     Drinks per session: 1 or 2     Binge frequency: Never     Comment: occasional wine    Drug use: Not on file    Sexual activity: Not on file     Review of Systems   Constitutional: Negative for chills and fever.   HENT: Negative for congestion, postnasal drip and rhinorrhea.    Eyes: Negative for visual disturbance.   Respiratory: Positive for chest tightness and shortness of breath.    Cardiovascular: Negative for chest pain and palpitations.   Gastrointestinal: Positive for nausea and vomiting. Negative for abdominal  distention and abdominal pain.   Genitourinary: Negative for difficulty urinating.   Musculoskeletal: Negative for arthralgias and myalgias.   Skin: Negative for color change and wound.   Neurological: Negative for weakness and headaches.   Hematological: Does not bruise/bleed easily.   Psychiatric/Behavioral: Negative for agitation.     Objective:     Vital Signs (Most Recent):  Temp: 96.5 °F (35.8 °C) (03/28/20 2202)  Pulse: (!) 114 (03/28/20 2302)  Resp: (!) 28 (03/28/20 2302)  BP: 119/65 (03/28/20 2302)  SpO2: 100 % (03/28/20 2302) Vital Signs (24h Range):  Temp:  [96.5 °F (35.8 °C)-97.6 °F (36.4 °C)] 96.5 °F (35.8 °C)  Pulse:  [114-124] 114  Resp:  [26-32] 28  SpO2:  [100 %] 100 %  BP: (119-162)/(63-99) 119/65     Weight: 68.5 kg (151 lb)  Body mass index is 25.13 kg/m².    Physical Exam   Constitutional: She is oriented to person, place, and time. She appears well-developed and well-nourished.   HENT:   Head: Normocephalic and atraumatic.   Eyes: Pupils are equal, round, and reactive to light. EOM are normal.   Neck: Normal range of motion. Neck supple.   Cardiovascular: Tachycardia present.   No murmur heard.  Pulmonary/Chest: Tachypnea noted.   Diminished breath sounds   Abdominal: Soft. Bowel sounds are normal.   Musculoskeletal: She exhibits no deformity.   Neurological: She is alert and oriented to person, place, and time.   Skin: Skin is warm and dry.   Psychiatric: She has a normal mood and affect.         CRANIAL NERVES     CN III, IV, VI   Pupils are equal, round, and reactive to light.  Extraocular motions are normal.        Significant Labs:   A1C:   Recent Labs   Lab 11/25/19  0940   HGBA1C 7.8*     Bilirubin:   Recent Labs   Lab 03/28/20 1911   BILITOT 0.2     BMP:   Recent Labs   Lab 03/28/20 1911   *      K 5.4*   *   CO2 <5*   BUN 25*   CREATININE 1.5*   CALCIUM 8.8   MG 2.6     CBC:   Recent Labs   Lab 03/28/20  1758   WBC 27.63*   HGB 16.2*   HCT 52.5*        CMP:    Recent Labs   Lab 03/28/20 1911      K 5.4*   *   CO2 <5*   *   BUN 25*   CREATININE 1.5*   CALCIUM 8.8   PROT 7.2   ALBUMIN 4.0   BILITOT 0.2   ALKPHOS 138*   AST 20   ALT 19   ANIONGAP Unable to calculate   EGFRNONAA 39*     Cardiac Markers:   Recent Labs   Lab 03/28/20  1758   BNP 44     Coagulation:   Recent Labs   Lab 03/28/20 1758   INR 0.9     Lactic Acid:   Recent Labs   Lab 03/28/20 1758   LACTATE 6.5*     Lipase:   Recent Labs   Lab 03/28/20 1911   LIPASE 72*     Magnesium:   Recent Labs   Lab 03/28/20 1911   MG 2.6     Troponin:   Recent Labs   Lab 03/28/20 1758   TROPONINI 0.006     TSH:   Recent Labs   Lab 11/25/19  0940   TSH 1.104     Urine Studies:   Recent Labs   Lab 03/28/20 1925   COLORU Yellow   APPEARANCEUA Clear   PHUR 6.0   SPECGRAV >=1.030*   PROTEINUA 1+*   GLUCUA 3+*   KETONESU 3+*   BILIRUBINUA Negative   OCCULTUA 2+*   NITRITE Negative   UROBILINOGEN Negative   LEUKOCYTESUR Negative   RBCUA 0   WBCUA 1   BACTERIA None   HYALINECASTS 0       Significant Imaging: I have reviewed all pertinent imaging results/findings within the past 24 hours.

## 2020-03-30 ENCOUNTER — HOSPITAL ENCOUNTER (INPATIENT)
Facility: HOSPITAL | Age: 55
LOS: 3 days | Discharge: HOME | DRG: 637 | End: 2020-04-02
Attending: INTERNAL MEDICINE | Admitting: INTERNAL MEDICINE
Payer: COMMERCIAL

## 2020-03-30 VITALS
OXYGEN SATURATION: 100 % | HEART RATE: 89 BPM | SYSTOLIC BLOOD PRESSURE: 132 MMHG | WEIGHT: 151 LBS | DIASTOLIC BLOOD PRESSURE: 65 MMHG | TEMPERATURE: 98 F | BODY MASS INDEX: 25.13 KG/M2 | RESPIRATION RATE: 28 BRPM

## 2020-03-30 DIAGNOSIS — E11.10 DKA (DIABETIC KETOACIDOSIS): ICD-10-CM

## 2020-03-30 DIAGNOSIS — E10.8 TYPE 1 DIABETES MELLITUS WITH COMPLICATIONS: Primary | Chronic | ICD-10-CM

## 2020-03-30 DIAGNOSIS — K85.90 ACUTE PANCREATITIS, UNSPECIFIED COMPLICATION STATUS, UNSPECIFIED PANCREATITIS TYPE: ICD-10-CM

## 2020-03-30 DIAGNOSIS — E10.10 DIABETIC KETOACIDOSIS WITHOUT COMA ASSOCIATED WITH TYPE 1 DIABETES MELLITUS: ICD-10-CM

## 2020-03-30 DIAGNOSIS — E87.29 HIGH ANION GAP METABOLIC ACIDOSIS: ICD-10-CM

## 2020-03-30 DIAGNOSIS — J18.9 PRIMARY ATYPICAL PNEUMONIA: ICD-10-CM

## 2020-03-30 DIAGNOSIS — E06.3 HYPOTHYROIDISM DUE TO HASHIMOTO'S THYROIDITIS: Chronic | ICD-10-CM

## 2020-03-30 DIAGNOSIS — E03.8 HYPOTHYROIDISM DUE TO HASHIMOTO'S THYROIDITIS: Chronic | ICD-10-CM

## 2020-03-30 DIAGNOSIS — E87.6 HYPOKALEMIA: ICD-10-CM

## 2020-03-30 DIAGNOSIS — E83.39 HYPOPHOSPHATEMIA: ICD-10-CM

## 2020-03-30 PROBLEM — R19.7 DIARRHEA OF PRESUMED INFECTIOUS ORIGIN: Status: ACTIVE | Noted: 2020-03-30

## 2020-03-30 PROBLEM — K20.90 ESOPHAGITIS, ACUTE: Status: RESOLVED | Noted: 2020-03-29 | Resolved: 2020-03-30

## 2020-03-30 LAB
ALBUMIN SERPL BCP-MCNC: 3.8 G/DL (ref 3.5–5.2)
ALLENS TEST: ABNORMAL
ALP SERPL-CCNC: 123 U/L (ref 55–135)
ALT SERPL W/O P-5'-P-CCNC: 21 U/L (ref 10–44)
ANION GAP SERPL CALC-SCNC: 15 MMOL/L (ref 8–16)
ANION GAP SERPL CALC-SCNC: 16 MMOL/L (ref 8–16)
ANION GAP SERPL CALC-SCNC: 24 MMOL/L (ref 8–16)
ANION GAP SERPL CALC-SCNC: ABNORMAL MMOL/L (ref 8–16)
AST SERPL-CCNC: 20 U/L (ref 10–40)
BASOPHILS # BLD AUTO: 0.03 K/UL (ref 0–0.2)
BASOPHILS NFR BLD: 0.1 % (ref 0–1.9)
BILIRUB SERPL-MCNC: 0.6 MG/DL (ref 0.1–1)
BUN SERPL-MCNC: 17 MG/DL (ref 6–20)
BUN SERPL-MCNC: 18 MG/DL (ref 6–20)
CALCIUM SERPL-MCNC: 8.3 MG/DL (ref 8.7–10.5)
CALCIUM SERPL-MCNC: 8.6 MG/DL (ref 8.7–10.5)
CALCIUM SERPL-MCNC: 8.6 MG/DL (ref 8.7–10.5)
CALCIUM SERPL-MCNC: 9.2 MG/DL (ref 8.7–10.5)
CHLORIDE SERPL-SCNC: 111 MMOL/L (ref 95–110)
CHLORIDE SERPL-SCNC: 114 MMOL/L (ref 95–110)
CHLORIDE SERPL-SCNC: 117 MMOL/L (ref 95–110)
CHLORIDE SERPL-SCNC: 118 MMOL/L (ref 95–110)
CO2 SERPL-SCNC: 5 MMOL/L (ref 23–29)
CO2 SERPL-SCNC: 7 MMOL/L (ref 23–29)
CO2 SERPL-SCNC: 8 MMOL/L (ref 23–29)
CO2 SERPL-SCNC: <5 MMOL/L (ref 23–29)
CREAT SERPL-MCNC: 1.2 MG/DL (ref 0.5–1.4)
CREAT SERPL-MCNC: 1.2 MG/DL (ref 0.5–1.4)
CREAT SERPL-MCNC: 1.3 MG/DL (ref 0.5–1.4)
CREAT SERPL-MCNC: 1.4 MG/DL (ref 0.5–1.4)
CRP SERPL-MCNC: 56.99 MG/L (ref 0–3.19)
DELSYS: ABNORMAL
DIFFERENTIAL METHOD: ABNORMAL
EOSINOPHIL # BLD AUTO: 0.1 K/UL (ref 0–0.5)
EOSINOPHIL NFR BLD: 0.6 % (ref 0–8)
ERYTHROCYTE [DISTWIDTH] IN BLOOD BY AUTOMATED COUNT: 14 % (ref 11.5–14.5)
EST. GFR  (AFRICAN AMERICAN): 48 ML/MIN/1.73 M^2
EST. GFR  (AFRICAN AMERICAN): 53 ML/MIN/1.73 M^2
EST. GFR  (AFRICAN AMERICAN): 58 ML/MIN/1.73 M^2
EST. GFR  (AFRICAN AMERICAN): 58 ML/MIN/1.73 M^2
EST. GFR  (NON AFRICAN AMERICAN): 42 ML/MIN/1.73 M^2
EST. GFR  (NON AFRICAN AMERICAN): 46 ML/MIN/1.73 M^2
EST. GFR  (NON AFRICAN AMERICAN): 51 ML/MIN/1.73 M^2
EST. GFR  (NON AFRICAN AMERICAN): 51 ML/MIN/1.73 M^2
ESTIMATED AVG GLUCOSE: 171 MG/DL (ref 68–131)
GLUCOSE SERPL-MCNC: 169 MG/DL (ref 70–110)
GLUCOSE SERPL-MCNC: 235 MG/DL (ref 70–110)
GLUCOSE SERPL-MCNC: 321 MG/DL (ref 70–110)
GLUCOSE SERPL-MCNC: 362 MG/DL (ref 70–110)
GROUP A STREP, MOLECULAR: NEGATIVE
HBA1C MFR BLD HPLC: 7.6 % (ref 4–5.6)
HCO3 UR-SCNC: 6.1 MMOL/L (ref 24–28)
HCT VFR BLD AUTO: 43.7 % (ref 37–48.5)
HGB BLD-MCNC: 13.7 G/DL (ref 12–16)
IMM GRANULOCYTES # BLD AUTO: 0.61 K/UL (ref 0–0.04)
IMM GRANULOCYTES NFR BLD AUTO: 2.6 % (ref 0–0.5)
LDH SERPL L TO P-CCNC: 220 U/L (ref 110–260)
LYMPHOCYTES # BLD AUTO: 0.9 K/UL (ref 1–4.8)
LYMPHOCYTES NFR BLD: 4 % (ref 18–48)
MAGNESIUM SERPL-MCNC: 2.1 MG/DL (ref 1.6–2.6)
MCH RBC QN AUTO: 29.8 PG (ref 27–31)
MCHC RBC AUTO-ENTMCNC: 31.4 G/DL (ref 32–36)
MCV RBC AUTO: 95 FL (ref 82–98)
MONOCYTES # BLD AUTO: 1 K/UL (ref 0.3–1)
MONOCYTES NFR BLD: 4.1 % (ref 4–15)
NEUTROPHILS # BLD AUTO: 21 K/UL (ref 1.8–7.7)
NEUTROPHILS NFR BLD: 88.6 % (ref 38–73)
NRBC BLD-RTO: 0 /100 WBC
PCO2 BLDA: 14.9 MMHG (ref 35–45)
PH SMN: 7.22 [PH] (ref 7.35–7.45)
PHOSPHATE SERPL-MCNC: 4 MG/DL (ref 2.7–4.5)
PLATELET # BLD AUTO: 279 K/UL (ref 150–350)
PLATELET BLD QL SMEAR: ABNORMAL
PMV BLD AUTO: 9.8 FL (ref 9.2–12.9)
PO2 BLDA: 104 MMHG (ref 80–100)
POC BE: -22 MMOL/L
POC SATURATED O2: 97 % (ref 95–100)
POC TCO2: 7 MMOL/L (ref 23–27)
POCT GLUCOSE: 162 MG/DL (ref 70–110)
POCT GLUCOSE: 182 MG/DL (ref 70–110)
POCT GLUCOSE: 192 MG/DL (ref 70–110)
POCT GLUCOSE: 194 MG/DL (ref 70–110)
POCT GLUCOSE: 219 MG/DL (ref 70–110)
POCT GLUCOSE: 222 MG/DL (ref 70–110)
POCT GLUCOSE: 259 MG/DL (ref 70–110)
POCT GLUCOSE: 261 MG/DL (ref 70–110)
POCT GLUCOSE: 282 MG/DL (ref 70–110)
POCT GLUCOSE: 323 MG/DL (ref 70–110)
POCT GLUCOSE: 335 MG/DL (ref 70–110)
POTASSIUM SERPL-SCNC: 3.5 MMOL/L (ref 3.5–5.1)
POTASSIUM SERPL-SCNC: 3.5 MMOL/L (ref 3.5–5.1)
POTASSIUM SERPL-SCNC: 3.6 MMOL/L (ref 3.5–5.1)
POTASSIUM SERPL-SCNC: 4 MMOL/L (ref 3.5–5.1)
PROT SERPL-MCNC: 7 G/DL (ref 6–8.4)
RBC # BLD AUTO: 4.6 M/UL (ref 4–5.4)
SAMPLE: ABNORMAL
SITE: ABNORMAL
SODIUM SERPL-SCNC: 140 MMOL/L (ref 136–145)
SODIUM SERPL-SCNC: 140 MMOL/L (ref 136–145)
SODIUM SERPL-SCNC: 141 MMOL/L (ref 136–145)
SODIUM SERPL-SCNC: 143 MMOL/L (ref 136–145)
VANCOMYCIN TROUGH SERPL-MCNC: 4.8 UG/ML (ref 10–22)
WBC # BLD AUTO: 23.66 K/UL (ref 3.9–12.7)

## 2020-03-30 PROCEDURE — 80048 BASIC METABOLIC PNL TOTAL CA: CPT | Mod: 91

## 2020-03-30 PROCEDURE — 84100 ASSAY OF PHOSPHORUS: CPT

## 2020-03-30 PROCEDURE — 82803 BLOOD GASES ANY COMBINATION: CPT

## 2020-03-30 PROCEDURE — 86141 C-REACTIVE PROTEIN HS: CPT

## 2020-03-30 PROCEDURE — S5010 5% DEXTROSE AND 0.45% SALINE: HCPCS | Performed by: INTERNAL MEDICINE

## 2020-03-30 PROCEDURE — 63600175 PHARM REV CODE 636 W HCPCS: Performed by: NURSE PRACTITIONER

## 2020-03-30 PROCEDURE — 36600 WITHDRAWAL OF ARTERIAL BLOOD: CPT

## 2020-03-30 PROCEDURE — 99232 PR SUBSEQUENT HOSPITAL CARE,LEVL II: ICD-10-PCS | Mod: ,,, | Performed by: INTERNAL MEDICINE

## 2020-03-30 PROCEDURE — 63600175 PHARM REV CODE 636 W HCPCS: Performed by: INTERNAL MEDICINE

## 2020-03-30 PROCEDURE — 85025 COMPLETE CBC W/AUTO DIFF WBC: CPT

## 2020-03-30 PROCEDURE — 99232 SBSQ HOSP IP/OBS MODERATE 35: CPT | Mod: ,,, | Performed by: INTERNAL MEDICINE

## 2020-03-30 PROCEDURE — 80202 ASSAY OF VANCOMYCIN: CPT

## 2020-03-30 PROCEDURE — 80053 COMPREHEN METABOLIC PANEL: CPT

## 2020-03-30 PROCEDURE — 83036 HEMOGLOBIN GLYCOSYLATED A1C: CPT

## 2020-03-30 PROCEDURE — 25000003 PHARM REV CODE 250: Performed by: INTERNAL MEDICINE

## 2020-03-30 PROCEDURE — 20600001 HC STEP DOWN PRIVATE ROOM

## 2020-03-30 PROCEDURE — 25000003 PHARM REV CODE 250: Performed by: NURSE PRACTITIONER

## 2020-03-30 PROCEDURE — 94761 N-INVAS EAR/PLS OXIMETRY MLT: CPT

## 2020-03-30 PROCEDURE — 83615 LACTATE (LD) (LDH) ENZYME: CPT

## 2020-03-30 PROCEDURE — 83735 ASSAY OF MAGNESIUM: CPT

## 2020-03-30 PROCEDURE — 87651 STREP A DNA AMP PROBE: CPT

## 2020-03-30 PROCEDURE — 63600175 PHARM REV CODE 636 W HCPCS: Performed by: FAMILY MEDICINE

## 2020-03-30 PROCEDURE — 25000003 PHARM REV CODE 250: Performed by: FAMILY MEDICINE

## 2020-03-30 RX ORDER — HYDROXYCHLOROQUINE SULFATE 200 MG/1
400 TABLET, FILM COATED ORAL DAILY
Status: DISCONTINUED | OUTPATIENT
Start: 2020-03-30 | End: 2020-03-30 | Stop reason: HOSPADM

## 2020-03-30 RX ORDER — HYDROXYCHLOROQUINE SULFATE 200 MG/1
400 TABLET, FILM COATED ORAL DAILY
Status: DISCONTINUED | OUTPATIENT
Start: 2020-04-01 | End: 2020-03-30 | Stop reason: HOSPADM

## 2020-03-30 RX ORDER — DEXTROSE 50 % IN WATER (D50W) INTRAVENOUS SYRINGE
25
Status: DISCONTINUED | OUTPATIENT
Start: 2020-03-30 | End: 2020-03-30 | Stop reason: HOSPADM

## 2020-03-30 RX ORDER — DEXTROSE MONOHYDRATE 100 MG/ML
1000 INJECTION, SOLUTION INTRAVENOUS
Status: DISCONTINUED | OUTPATIENT
Start: 2020-03-30 | End: 2020-03-30 | Stop reason: HOSPADM

## 2020-03-30 RX ORDER — DEXTROSE 50 % IN WATER (D50W) INTRAVENOUS SYRINGE
12.5
Status: DISCONTINUED | OUTPATIENT
Start: 2020-03-30 | End: 2020-03-30 | Stop reason: HOSPADM

## 2020-03-30 RX ORDER — DEXTROSE MONOHYDRATE AND SODIUM CHLORIDE 5; .45 G/100ML; G/100ML
INJECTION, SOLUTION INTRAVENOUS CONTINUOUS
Status: DISCONTINUED | OUTPATIENT
Start: 2020-03-30 | End: 2020-03-30 | Stop reason: HOSPADM

## 2020-03-30 RX ADMIN — ONDANSETRON 4 MG: 2 INJECTION INTRAMUSCULAR; INTRAVENOUS at 04:03

## 2020-03-30 RX ADMIN — SODIUM CHLORIDE 1000 ML: 0.9 INJECTION, SOLUTION INTRAVENOUS at 07:03

## 2020-03-30 RX ADMIN — SODIUM BICARBONATE: 84 INJECTION, SOLUTION INTRAVENOUS at 07:03

## 2020-03-30 RX ADMIN — LEVOTHYROXINE SODIUM 100 MCG: 50 TABLET ORAL at 09:03

## 2020-03-30 RX ADMIN — AZITHROMYCIN MONOHYDRATE 250 MG: 250 TABLET ORAL at 09:03

## 2020-03-30 RX ADMIN — ENOXAPARIN SODIUM 30 MG: 100 INJECTION SUBCUTANEOUS at 08:03

## 2020-03-30 RX ADMIN — PANTOPRAZOLE SODIUM 40 MG: 40 TABLET, DELAYED RELEASE ORAL at 09:03

## 2020-03-30 RX ADMIN — PIPERACILLIN SODIUM AND TAZOBACTAM SODIUM 4.5 G: 4; .5 INJECTION, POWDER, LYOPHILIZED, FOR SOLUTION INTRAVENOUS at 02:03

## 2020-03-30 RX ADMIN — PANTOPRAZOLE SODIUM 40 MG: 40 TABLET, DELAYED RELEASE ORAL at 08:03

## 2020-03-30 RX ADMIN — ACETAMINOPHEN 650 MG: 325 TABLET ORAL at 05:03

## 2020-03-30 RX ADMIN — SODIUM CHLORIDE 2.5 UNITS/HR: 9 INJECTION, SOLUTION INTRAVENOUS at 09:03

## 2020-03-30 RX ADMIN — SODIUM CHLORIDE 1000 ML: 0.9 INJECTION, SOLUTION INTRAVENOUS at 09:03

## 2020-03-30 RX ADMIN — PIPERACILLIN SODIUM AND TAZOBACTAM SODIUM 4.5 G: 4; .5 INJECTION, POWDER, LYOPHILIZED, FOR SOLUTION INTRAVENOUS at 03:03

## 2020-03-30 RX ADMIN — HYDROXYCHLOROQUINE SULFATE 400 MG: 200 TABLET, FILM COATED ORAL at 02:03

## 2020-03-30 RX ADMIN — DEXTROSE AND SODIUM CHLORIDE: 5; .45 INJECTION, SOLUTION INTRAVENOUS at 04:03

## 2020-03-30 NOTE — ED NOTES
Pt being transferred to room 8088 at [facility]. Transportation will be here within the hour. Call report to [phone].

## 2020-03-30 NOTE — ED NOTES
Dr Morelos called again, pt continues tachy and tachypneic, worsening motteling weakening periph pulses to nivia lower extremities

## 2020-03-30 NOTE — PROGRESS NOTES
[facility] Hospital Medicine  Progress Note    Patient Name: Halle Spence  MRN: [id number]  Patient Class: IP- Inpatient   Admission Date: 3/28/2020  Length of Stay: 2 days  Attending Physician: Michelle Jimenez MD  Primary Care Provider: Vilma Milan MD        Subjective:     Principal Problem:Diabetic ketoacidosis without coma associated with type 1 diabetes mellitus        HPI:  54 y.o. female with past medical history of DM type 1 on insulin pump, who presents to the ED with complaint of nausea and vomiting.   Work up revealed DKA.  Patient also reported headache, cough, SOB; chest x-ray concerning for pneumonia.   No confirmed COVID contacts, has elderly mother who resided in a local nursing home known to have a COVID outbreak, and patient brought mother home to live with her one week ago.   Patient admitted for DKA, pneumonia, and suspected covid infection.    Overview/Hospital Course:  3/29/20- Anion gap closed, patient transitioned to SQ insulin and NS IV fluids. Seen by ID; on plaquenil for suspected COVID infection; also on empiric ABX for bacterial PNA.   3/30/20- patient went back into DKA overnight, restarting Insulin infusion protocol, upgrading to ICU level of care. COVID test returned NEG but high clinical suspicion and will continue to treat accordingly    Interval History:   Patient seen at bedside, still holding in ED.  Reports SOB, chest tightness, cough, new onset diarrhea.  No oral intake over past 24 hours due to loss of appetite, painful swallowing.  Patient with tachypnea and tachycardia, but maintaining oxygen saturations on RA.      Review of Systems   Constitutional: Positive for fatigue. Negative for chills, diaphoresis and fever.   HENT: Positive for sore throat. Negative for congestion and trouble swallowing.    Respiratory: Positive for cough, chest tightness and shortness of breath. Negative for wheezing and stridor.    Cardiovascular: Negative for  chest pain and leg swelling.   Gastrointestinal: Positive for diarrhea and nausea. Negative for abdominal distention and abdominal pain.   Endocrine: Negative for cold intolerance.   Genitourinary: Negative for difficulty urinating.   Musculoskeletal: Negative for arthralgias and myalgias.   Skin: Positive for color change. Negative for rash.   Neurological: Negative for tremors and seizures.   Hematological: Negative for adenopathy.   Psychiatric/Behavioral: The patient is nervous/anxious.      Objective:     Vital Signs (Most Recent):  Temp: 97.8 °F (36.6 °C) (03/30/20 0621)  Pulse: (!) 117 (03/30/20 0902)  Resp: (!) 38 (03/30/20 0902)  BP: 127/67 (03/30/20 0902)  SpO2: 98 % (03/30/20 0902) Vital Signs (24h Range):  Temp:  [97.8 °F (36.6 °C)-98.8 °F (37.1 °C)] 97.8 °F (36.6 °C)  Pulse:  [] 117  Resp:  [16-38] 38  SpO2:  [98 %-100 %] 98 %  BP: ()/(51-71) 127/67     Weight: 68.5 kg (151 lb)  Body mass index is 25.13 kg/m².    Intake/Output Summary (Last 24 hours) at 3/30/2020 1029  Last data filed at 3/30/2020 0833  Gross per 24 hour   Intake 2000 ml   Output 600 ml   Net 1400 ml      Physical Exam   Constitutional: She is oriented to person, place, and time. She appears well-developed and well-nourished. No distress.   HENT:   Head: Normocephalic.   Eyes: Conjunctivae are normal.   Neck: No JVD present.   Cardiovascular: Regular rhythm.   No murmur heard.  + tachycardia     Pulmonary/Chest: Breath sounds normal. She has no wheezes.   + tachypnea   Abdominal: Soft. Bowel sounds are normal. She exhibits no distension. There is no tenderness.   Musculoskeletal: She exhibits no edema.   Neurological: She is alert and oriented to person, place, and time.   Skin: Skin is warm and dry.   + mottled     Psychiatric:   + anxious       Significant Labs:   Results for MICHAEL THOMPSON (MRN [id number]) as of 3/30/2020 09:59   Ref. Range 3/30/2020 07:35   Sodium Latest Ref Range: 136 - 145 mmol/L 140   Potassium  Latest Ref Range: 3.5 - 5.1 mmol/L 4.0   Chloride Latest Ref Range: 95 - 110 mmol/L 111 (H)   CO2 Latest Ref Range: 23 - 29 mmol/L <5 (LL)   Anion Gap Latest Ref Range: 8 - 16 mmol/L Unable to calculate   BUN, Bld Latest Ref Range: 6 - 20 mg/dL 17   Creatinine Latest Ref Range: 0.5 - 1.4 mg/dL 1.4             Assessment/Plan:      * Diabetic ketoacidosis without coma associated with type 1 diabetes mellitus  DKA (diabetic ketoacidosis)    - back in DKA overnight after initial resolution  - restart insulin infusion/fluid protocol, Q 1 hour accuchecks  - awaiting repeat labs  - upgrade to ICU level of care    Esophagitis, acute  - likely secondary to vomiting  - PPI twice daily  - assess for strep throat - rapid strep throat swab today       Pneumonia  Suspected Covid-19 Virus Infection    Pneumonia     Covid-19 NEG but high clinical suspicion so will continue to treat as such- restart Hydroxychloroquine and isolation status which were both discontinued overnight when COVID test returned NEG    Influenza: negative  Continuous Cardiac Monitoring   Blood Cultures: pending  Infectious disease team following  Continue Vanc/Zosyn, Azithromycin for bacterial PNA coverage     Hypothyroidism due to Hashimoto's thyroiditis  Continue Synthroid      VTE Risk Mitigation (From admission, onward)         Ordered     enoxaparin injection 30 mg  Daily      03/28/20 2052     IP VTE HIGH RISK PATIENT  Once      03/28/20 2052                      Michelle Jimenez MD  Department of Hospital Medicine   [facility]

## 2020-03-30 NOTE — ASSESSMENT & PLAN NOTE
- likely secondary to vomiting  - PPI twice daily  - assess for strep throat - rapid strep throat swab today

## 2020-03-30 NOTE — ED NOTES
Received report from Monse Clark , pt c/o sob, upon exam moteling noted to nivia upper and lower ext, skin w&d to touch cm shows st without ectopic, resp 38, o2 3lnc placed, + acetone smell to breath. Dr Morelos called.

## 2020-03-30 NOTE — PROGRESS NOTES
[facility] Infectious Disease Consult     Primary Team: Michelle Jimenez MD  Consultant Attending: Dr. Eva PALMER  Date of Admit: 3/28/2020    Reason for Consult     COVID 19 rule out & hydroxychlorquine recommendations     History of Present Illness     Per admit H&P: Patient is a 53 yo F with PMH DM1, allergic rhinitis, thyroid disease who presented with chief complaint of nausea and vomiting for unknown duration. Patient reportedly endorsed associated chest pain and SOB but denied cough, fevers, chills, changes in bowel or bladder habits. Afebrile in ED but tachycardic and tachypneic. Lab work with elevated WBC count, elevated lactate, elevated ferritin, elevated LDH and CRP, and CMP consistent with DKA. CT chest with atelectatic/consolidative changes in lower lobes. ID consulted for further assistance.    Interval: Patient afebrile past 24 hours. O2 sats normal on room air.    Review of Systems (positives in bold):  ROS  Defer to primary team ROS due to COVID19 testing    Allergies:  Review of patient's allergies indicates:   Allergen Reactions    Bactrim [sulfamethoxazole-trimethoprim] Hives and Itching    Codeine Anaphylaxis       Medications:   In-Hospital Scheduled Medications:   azithromycin  250 mg Oral Daily    enoxaparin  30 mg Subcutaneous Daily    levothyroxine  100 mcg Oral Daily    pantoprazole  40 mg Oral BID    piperacillin-tazobactam (ZOSYN) IVPB  4.5 g Intravenous Q8H    vancomycin in dextrose 5 %  1,000 mg Intravenous Q24H      In-Hospital PRN Medications:  acetaminophen, albuterol, dextrose 10 % in water (D10W), dextrose 10 % in water (D10W), dextrose 10 % in water (D10W), dextrose 10 % in water (D10W), dextrose 50 % in water (D50W), dextrose 50 % in water (D50W), melatonin, ondansetron, sodium chloride 0.9%    Objective   Last 24 Hour Vital Signs:  BP  Min: 98/53  Max: 143/63  Temp  Av.4 °F (36.9 °C)  Min: 97.8 °F (36.6 °C)  Max: 98.8 °F (37.1 °C)  Pulse  Av.7  Min: 71  Max:  119  Resp  Av.8  Min: 16  Max: 38  SpO2  Av.7 %  Min: 98 %  Max: 100 %        Physical Examination:  Physical Exam  Defer to primary team due to COVID19 testing    Laboratory:  CBC:   Lab Results   Component Value Date    WBC 27.63 (H) 2020    HGB 16.2 (H) 2020     2020    MCV 99 (H) 2020    RDW 12.9 2020       Estimated Creatinine Clearance: 43.6 mL/min (based on SCr of 1.4 mg/dL).      Assessment     Per admit H&P:  Halle Spence is a 54 y.o. female with PMH DM1, allergic rhinitis, thyroid disease here for DKA, currently being ruled out for COVID-19.     Recommendations   DKA with possible pneumonia  - COVID-19 testing negative, agree with stopping hydroxychloroquine  - continue azithromycin, would stop vanc and cefepime  - would repeat CBC with next lab draw to ensure resolution of leukocytosis      Thank you for this consult. We will follow.      Brendan Pritchett MD  [facility]  [facility] Infectious Disease

## 2020-03-30 NOTE — SUBJECTIVE & OBJECTIVE
Interval History:   Patient seen at bedside, still holding in ED.  Reports SOB, chest tightness, cough, new onset diarrhea.  No oral intake over past 24 hours due to loss of appetite, painful swallowing.  Patient with tachypnea and tachycardia, but maintaining oxygen saturations on RA.      Review of Systems   Constitutional: Positive for fatigue. Negative for chills, diaphoresis and fever.   HENT: Positive for sore throat. Negative for congestion and trouble swallowing.    Respiratory: Positive for cough, chest tightness and shortness of breath. Negative for wheezing and stridor.    Cardiovascular: Negative for chest pain and leg swelling.   Gastrointestinal: Positive for diarrhea and nausea. Negative for abdominal distention and abdominal pain.   Endocrine: Negative for cold intolerance.   Genitourinary: Negative for difficulty urinating.   Musculoskeletal: Negative for arthralgias and myalgias.   Skin: Positive for color change. Negative for rash.   Neurological: Negative for tremors and seizures.   Hematological: Negative for adenopathy.   Psychiatric/Behavioral: The patient is nervous/anxious.      Objective:     Vital Signs (Most Recent):  Temp: 97.8 °F (36.6 °C) (03/30/20 0621)  Pulse: (!) 117 (03/30/20 0902)  Resp: (!) 38 (03/30/20 0902)  BP: 127/67 (03/30/20 0902)  SpO2: 98 % (03/30/20 0902) Vital Signs (24h Range):  Temp:  [97.8 °F (36.6 °C)-98.8 °F (37.1 °C)] 97.8 °F (36.6 °C)  Pulse:  [] 117  Resp:  [16-38] 38  SpO2:  [98 %-100 %] 98 %  BP: ()/(51-71) 127/67     Weight: 68.5 kg (151 lb)  Body mass index is 25.13 kg/m².    Intake/Output Summary (Last 24 hours) at 3/30/2020 1029  Last data filed at 3/30/2020 0833  Gross per 24 hour   Intake 2000 ml   Output 600 ml   Net 1400 ml      Physical Exam   Constitutional: She is oriented to person, place, and time. She appears well-developed and well-nourished. No distress.   HENT:   Head: Normocephalic.   Eyes: Conjunctivae are normal.   Neck: No JVD  present.   Cardiovascular: Regular rhythm.   No murmur heard.  + tachycardia     Pulmonary/Chest: Breath sounds normal. She has no wheezes.   + tachypnea   Abdominal: Soft. Bowel sounds are normal. She exhibits no distension. There is no tenderness.   Musculoskeletal: She exhibits no edema.   Neurological: She is alert and oriented to person, place, and time.   Skin: Skin is warm and dry.   + mottled     Psychiatric:   + anxious       Significant Labs:   Results for MICHAEL THOMPSON (MRN [id number]) as of 3/30/2020 09:59   Ref. Range 3/30/2020 07:35   Sodium Latest Ref Range: 136 - 145 mmol/L 140   Potassium Latest Ref Range: 3.5 - 5.1 mmol/L 4.0   Chloride Latest Ref Range: 95 - 110 mmol/L 111 (H)   CO2 Latest Ref Range: 23 - 29 mmol/L <5 (LL)   Anion Gap Latest Ref Range: 8 - 16 mmol/L Unable to calculate   BUN, Bld Latest Ref Range: 6 - 20 mg/dL 17   Creatinine Latest Ref Range: 0.5 - 1.4 mg/dL 1.4

## 2020-03-30 NOTE — ASSESSMENT & PLAN NOTE
DKA (diabetic ketoacidosis)    - back in DKA overnight after initial resolution  - restart insulin infusion/fluid protocol, Q 1 hour accuchecks  - awaiting repeat labs  - upgrade to ICU level of care

## 2020-03-30 NOTE — ED NOTES
Rec'd report from EFRA Boyd RN. Pt is resting w/ eyes closed and easily arousable. Pt denies distress, states she has a sore throat from vomiting so much. Pt is A & O x 3, denies respiratory distress. VSS. Pt is connected to the pulse ox, B/P cuff and EKG monitor. Bed is locked and in the low position w/ the side rails up and locked for pt safety. Call bell @ the BS. Will continue to monitor closely.

## 2020-03-30 NOTE — ED NOTES
Pt urinated in bedpan. Pt cleaned and provided w/ a clean bedpan. Pt is A & O x 3, denies SOB at this time. VSS. Will continue to monitor closely.

## 2020-03-30 NOTE — ED NOTES
Paged [facility] regarding hydroxychloroquine medication order and negative COVID-19 test. Discontinue med per provider.

## 2020-03-30 NOTE — ASSESSMENT & PLAN NOTE
Suspected Covid-19 Virus Infection    Pneumonia     Covid-19 NEG but high clinical suspicion so will continue to treat as such- restart Hydroxychloroquine and isolation status which were both discontinued overnight when COVID test returned NEG    Influenza: negative  Continuous Cardiac Monitoring   Blood Cultures: pending  Infectious disease team following  Continue Vanc/Zosyn, Azithromycin for bacterial PNA coverage

## 2020-03-31 PROBLEM — E83.39 HYPOPHOSPHATEMIA: Status: ACTIVE | Noted: 2020-03-31

## 2020-03-31 PROBLEM — E87.6 HYPOKALEMIA: Status: ACTIVE | Noted: 2020-03-31

## 2020-03-31 PROBLEM — E87.29 HIGH ANION GAP METABOLIC ACIDOSIS: Status: ACTIVE | Noted: 2020-03-28

## 2020-03-31 LAB
ALBUMIN SERPL BCP-MCNC: 3.1 G/DL (ref 3.5–5.2)
ALP SERPL-CCNC: 104 U/L (ref 55–135)
ALT SERPL W/O P-5'-P-CCNC: 17 U/L (ref 10–44)
ANION GAP SERPL CALC-SCNC: 10 MMOL/L (ref 8–16)
ANION GAP SERPL CALC-SCNC: 13 MMOL/L (ref 8–16)
ANION GAP SERPL CALC-SCNC: 15 MMOL/L (ref 8–16)
ANION GAP SERPL CALC-SCNC: 19 MMOL/L (ref 8–16)
AST SERPL-CCNC: 17 U/L (ref 10–40)
BACTERIA #/AREA URNS AUTO: ABNORMAL /HPF
BASOPHILS # BLD AUTO: 0.03 K/UL (ref 0–0.2)
BASOPHILS NFR BLD: 0.2 % (ref 0–1.9)
BILIRUB SERPL-MCNC: 0.8 MG/DL (ref 0.1–1)
BILIRUB UR QL STRIP: NEGATIVE
BUN SERPL-MCNC: 12 MG/DL (ref 6–20)
BUN SERPL-MCNC: 13 MG/DL (ref 6–20)
BUN SERPL-MCNC: 13 MG/DL (ref 6–20)
BUN SERPL-MCNC: 14 MG/DL (ref 6–20)
BUN SERPL-MCNC: 15 MG/DL (ref 6–20)
BUN SERPL-MCNC: 15 MG/DL (ref 6–20)
CALCIUM SERPL-MCNC: 8 MG/DL (ref 8.7–10.5)
CALCIUM SERPL-MCNC: 8.2 MG/DL (ref 8.7–10.5)
CALCIUM SERPL-MCNC: 8.3 MG/DL (ref 8.7–10.5)
CALCIUM SERPL-MCNC: 8.3 MG/DL (ref 8.7–10.5)
CALCIUM SERPL-MCNC: 8.4 MG/DL (ref 8.7–10.5)
CALCIUM SERPL-MCNC: 8.4 MG/DL (ref 8.7–10.5)
CHLORIDE SERPL-SCNC: 112 MMOL/L (ref 95–110)
CHLORIDE SERPL-SCNC: 114 MMOL/L (ref 95–110)
CHLORIDE SERPL-SCNC: 116 MMOL/L (ref 95–110)
CHLORIDE SERPL-SCNC: 116 MMOL/L (ref 95–110)
CK SERPL-CCNC: 87 U/L (ref 20–180)
CLARITY UR REFRACT.AUTO: CLEAR
CO2 SERPL-SCNC: 10 MMOL/L (ref 23–29)
CO2 SERPL-SCNC: 10 MMOL/L (ref 23–29)
CO2 SERPL-SCNC: 13 MMOL/L (ref 23–29)
CO2 SERPL-SCNC: 14 MMOL/L (ref 23–29)
CO2 SERPL-SCNC: 7 MMOL/L (ref 23–29)
CO2 SERPL-SCNC: 9 MMOL/L (ref 23–29)
COLOR UR AUTO: ABNORMAL
CREAT SERPL-MCNC: 1 MG/DL (ref 0.5–1.4)
CREAT SERPL-MCNC: 1.1 MG/DL (ref 0.5–1.4)
CREAT SERPL-MCNC: 1.1 MG/DL (ref 0.5–1.4)
DIFFERENTIAL METHOD: ABNORMAL
EOSINOPHIL # BLD AUTO: 0 K/UL (ref 0–0.5)
EOSINOPHIL NFR BLD: 0 % (ref 0–8)
ERYTHROCYTE [DISTWIDTH] IN BLOOD BY AUTOMATED COUNT: 13.6 % (ref 11.5–14.5)
EST. GFR  (AFRICAN AMERICAN): >60 ML/MIN/1.73 M^2
EST. GFR  (NON AFRICAN AMERICAN): 56.3 ML/MIN/1.73 M^2
EST. GFR  (NON AFRICAN AMERICAN): 56.3 ML/MIN/1.73 M^2
EST. GFR  (NON AFRICAN AMERICAN): >60 ML/MIN/1.73 M^2
GLUCOSE SERPL-MCNC: 167 MG/DL (ref 70–110)
GLUCOSE SERPL-MCNC: 232 MG/DL (ref 70–110)
GLUCOSE SERPL-MCNC: 248 MG/DL (ref 70–110)
GLUCOSE SERPL-MCNC: 272 MG/DL (ref 70–110)
GLUCOSE SERPL-MCNC: 298 MG/DL (ref 70–110)
GLUCOSE SERPL-MCNC: 345 MG/DL (ref 70–110)
GLUCOSE UR QL STRIP: ABNORMAL
HCT VFR BLD AUTO: 39.8 % (ref 37–48.5)
HGB BLD-MCNC: 13.3 G/DL (ref 12–16)
HGB UR QL STRIP: ABNORMAL
IMM GRANULOCYTES # BLD AUTO: 0.17 K/UL (ref 0–0.04)
IMM GRANULOCYTES NFR BLD AUTO: 1.1 % (ref 0–0.5)
INFLUENZA A, MOLECULAR: NEGATIVE
INFLUENZA B, MOLECULAR: NEGATIVE
KETONES UR QL STRIP: ABNORMAL
LACTATE SERPL-SCNC: 1 MMOL/L (ref 0.5–2.2)
LEUKOCYTE ESTERASE UR QL STRIP: NEGATIVE
LIPASE SERPL-CCNC: 308 U/L (ref 4–60)
LYMPHOCYTES # BLD AUTO: 1.4 K/UL (ref 1–4.8)
LYMPHOCYTES NFR BLD: 8.7 % (ref 18–48)
MAGNESIUM SERPL-MCNC: 1.9 MG/DL (ref 1.6–2.6)
MAGNESIUM SERPL-MCNC: 1.9 MG/DL (ref 1.6–2.6)
MAGNESIUM SERPL-MCNC: 2 MG/DL (ref 1.6–2.6)
MAGNESIUM SERPL-MCNC: 2 MG/DL (ref 1.6–2.6)
MAGNESIUM SERPL-MCNC: 2.1 MG/DL (ref 1.6–2.6)
MAGNESIUM SERPL-MCNC: 2.1 MG/DL (ref 1.6–2.6)
MCH RBC QN AUTO: 30.4 PG (ref 27–31)
MCHC RBC AUTO-ENTMCNC: 33.4 G/DL (ref 32–36)
MCV RBC AUTO: 91 FL (ref 82–98)
MICROSCOPIC COMMENT: ABNORMAL
MONOCYTES # BLD AUTO: 1 K/UL (ref 0.3–1)
MONOCYTES NFR BLD: 6.4 % (ref 4–15)
NEUTROPHILS # BLD AUTO: 13.3 K/UL (ref 1.8–7.7)
NEUTROPHILS NFR BLD: 83.6 % (ref 38–73)
NITRITE UR QL STRIP: NEGATIVE
NRBC BLD-RTO: 0 /100 WBC
PH UR STRIP: 5 [PH] (ref 5–8)
PHOSPHATE SERPL-MCNC: 1.1 MG/DL (ref 2.7–4.5)
PHOSPHATE SERPL-MCNC: 1.3 MG/DL (ref 2.7–4.5)
PHOSPHATE SERPL-MCNC: 1.8 MG/DL (ref 2.7–4.5)
PHOSPHATE SERPL-MCNC: 1.9 MG/DL (ref 2.7–4.5)
PHOSPHATE SERPL-MCNC: 2.3 MG/DL (ref 2.7–4.5)
PHOSPHATE SERPL-MCNC: 2.6 MG/DL (ref 2.7–4.5)
PLATELET # BLD AUTO: 220 K/UL (ref 150–350)
PMV BLD AUTO: 9.6 FL (ref 9.2–12.9)
POCT GLUCOSE: 158 MG/DL (ref 70–110)
POCT GLUCOSE: 166 MG/DL (ref 70–110)
POCT GLUCOSE: 177 MG/DL (ref 70–110)
POCT GLUCOSE: 191 MG/DL (ref 70–110)
POCT GLUCOSE: 201 MG/DL (ref 70–110)
POCT GLUCOSE: 224 MG/DL (ref 70–110)
POCT GLUCOSE: 227 MG/DL (ref 70–110)
POCT GLUCOSE: 228 MG/DL (ref 70–110)
POCT GLUCOSE: 254 MG/DL (ref 70–110)
POCT GLUCOSE: 256 MG/DL (ref 70–110)
POCT GLUCOSE: 257 MG/DL (ref 70–110)
POCT GLUCOSE: 258 MG/DL (ref 70–110)
POCT GLUCOSE: 277 MG/DL (ref 70–110)
POCT GLUCOSE: 331 MG/DL (ref 70–110)
POCT GLUCOSE: 355 MG/DL (ref 70–110)
POCT GLUCOSE: 360 MG/DL (ref 70–110)
POCT GLUCOSE: 410 MG/DL (ref 70–110)
POTASSIUM SERPL-SCNC: 3.1 MMOL/L (ref 3.5–5.1)
POTASSIUM SERPL-SCNC: 3.1 MMOL/L (ref 3.5–5.1)
POTASSIUM SERPL-SCNC: 3.3 MMOL/L (ref 3.5–5.1)
POTASSIUM SERPL-SCNC: 3.6 MMOL/L (ref 3.5–5.1)
POTASSIUM SERPL-SCNC: 3.7 MMOL/L (ref 3.5–5.1)
POTASSIUM SERPL-SCNC: 4 MMOL/L (ref 3.5–5.1)
PROCALCITONIN SERPL IA-MCNC: 0.5 NG/ML
PROT SERPL-MCNC: 5.8 G/DL (ref 6–8.4)
PROT UR QL STRIP: NEGATIVE
RBC # BLD AUTO: 4.37 M/UL (ref 4–5.4)
RBC #/AREA URNS AUTO: 9 /HPF (ref 0–4)
SARS-COV-2 RNA RESP QL NAA+PROBE: NORMAL
SODIUM SERPL-SCNC: 138 MMOL/L (ref 136–145)
SODIUM SERPL-SCNC: 139 MMOL/L (ref 136–145)
SODIUM SERPL-SCNC: 139 MMOL/L (ref 136–145)
SODIUM SERPL-SCNC: 140 MMOL/L (ref 136–145)
SP GR UR STRIP: 1.02 (ref 1–1.03)
SPECIMEN SOURCE: NORMAL
SQUAMOUS #/AREA URNS AUTO: 6 /HPF
URN SPEC COLLECT METH UR: ABNORMAL
WBC # BLD AUTO: 15.85 K/UL (ref 3.9–12.7)
WBC #/AREA URNS AUTO: 1 /HPF (ref 0–5)
YEAST UR QL AUTO: ABNORMAL

## 2020-03-31 PROCEDURE — 63600175 PHARM REV CODE 636 W HCPCS: Performed by: INTERNAL MEDICINE

## 2020-03-31 PROCEDURE — 36415 COLL VENOUS BLD VENIPUNCTURE: CPT

## 2020-03-31 PROCEDURE — S5010 5% DEXTROSE AND 0.45% SALINE: HCPCS | Performed by: INTERNAL MEDICINE

## 2020-03-31 PROCEDURE — U0002 COVID-19 LAB TEST NON-CDC: HCPCS

## 2020-03-31 PROCEDURE — 25000003 PHARM REV CODE 250: Performed by: INTERNAL MEDICINE

## 2020-03-31 PROCEDURE — 83735 ASSAY OF MAGNESIUM: CPT | Mod: 91

## 2020-03-31 PROCEDURE — 63700000 PHARM REV CODE 250 ALT 637 W/O HCPCS: Performed by: STUDENT IN AN ORGANIZED HEALTH CARE EDUCATION/TRAINING PROGRAM

## 2020-03-31 PROCEDURE — 87449 NOS EACH ORGANISM AG IA: CPT

## 2020-03-31 PROCEDURE — 25000003 PHARM REV CODE 250: Performed by: STUDENT IN AN ORGANIZED HEALTH CARE EDUCATION/TRAINING PROGRAM

## 2020-03-31 PROCEDURE — 80053 COMPREHEN METABOLIC PANEL: CPT

## 2020-03-31 PROCEDURE — 99223 1ST HOSP IP/OBS HIGH 75: CPT | Mod: ,,, | Performed by: INTERNAL MEDICINE

## 2020-03-31 PROCEDURE — 63700000 PHARM REV CODE 250 ALT 637 W/O HCPCS: Performed by: INTERNAL MEDICINE

## 2020-03-31 PROCEDURE — 99223 PR INITIAL HOSPITAL CARE,LEVL III: ICD-10-PCS | Mod: ,,, | Performed by: INTERNAL MEDICINE

## 2020-03-31 PROCEDURE — 84100 ASSAY OF PHOSPHORUS: CPT | Mod: 91

## 2020-03-31 PROCEDURE — 84145 PROCALCITONIN (PCT): CPT

## 2020-03-31 PROCEDURE — 63600175 PHARM REV CODE 636 W HCPCS: Performed by: STUDENT IN AN ORGANIZED HEALTH CARE EDUCATION/TRAINING PROGRAM

## 2020-03-31 PROCEDURE — 87502 INFLUENZA DNA AMP PROBE: CPT

## 2020-03-31 PROCEDURE — 82550 ASSAY OF CK (CPK): CPT

## 2020-03-31 PROCEDURE — 20600001 HC STEP DOWN PRIVATE ROOM

## 2020-03-31 PROCEDURE — 87040 BLOOD CULTURE FOR BACTERIA: CPT | Mod: 59

## 2020-03-31 PROCEDURE — 80048 BASIC METABOLIC PNL TOTAL CA: CPT | Mod: 91

## 2020-03-31 PROCEDURE — 81001 URINALYSIS AUTO W/SCOPE: CPT

## 2020-03-31 PROCEDURE — 85025 COMPLETE CBC W/AUTO DIFF WBC: CPT

## 2020-03-31 PROCEDURE — 83690 ASSAY OF LIPASE: CPT

## 2020-03-31 PROCEDURE — 83605 ASSAY OF LACTIC ACID: CPT

## 2020-03-31 RX ORDER — TALC
6 POWDER (GRAM) TOPICAL NIGHTLY PRN
Status: DISCONTINUED | OUTPATIENT
Start: 2020-03-31 | End: 2020-04-02 | Stop reason: HOSPADM

## 2020-03-31 RX ORDER — GLUCAGON 1 MG
1 KIT INJECTION
Status: DISCONTINUED | OUTPATIENT
Start: 2020-03-31 | End: 2020-04-02 | Stop reason: HOSPADM

## 2020-03-31 RX ORDER — SODIUM CHLORIDE AND POTASSIUM CHLORIDE 150; 900 MG/100ML; MG/100ML
INJECTION, SOLUTION INTRAVENOUS CONTINUOUS
Status: DISCONTINUED | OUTPATIENT
Start: 2020-03-31 | End: 2020-03-31

## 2020-03-31 RX ORDER — DEXTROSE MONOHYDRATE 100 MG/ML
25 INJECTION, SOLUTION INTRAVENOUS
Status: DISCONTINUED | OUTPATIENT
Start: 2020-03-31 | End: 2020-04-01

## 2020-03-31 RX ORDER — MONTELUKAST SODIUM 10 MG/1
10 TABLET ORAL NIGHTLY
Status: DISCONTINUED | OUTPATIENT
Start: 2020-03-31 | End: 2020-04-02 | Stop reason: HOSPADM

## 2020-03-31 RX ORDER — DEXTROSE MONOHYDRATE 100 MG/ML
12.5 INJECTION, SOLUTION INTRAVENOUS
Status: DISCONTINUED | OUTPATIENT
Start: 2020-03-31 | End: 2020-04-01

## 2020-03-31 RX ORDER — LEVOFLOXACIN 750 MG/1
750 TABLET ORAL DAILY
Status: COMPLETED | OUTPATIENT
Start: 2020-04-01 | End: 2020-04-02

## 2020-03-31 RX ORDER — MORPHINE SULFATE 2 MG/ML
2 INJECTION, SOLUTION INTRAMUSCULAR; INTRAVENOUS EVERY 4 HOURS PRN
Status: DISCONTINUED | OUTPATIENT
Start: 2020-03-31 | End: 2020-03-31

## 2020-03-31 RX ORDER — ONDANSETRON 2 MG/ML
4 INJECTION INTRAMUSCULAR; INTRAVENOUS EVERY 8 HOURS PRN
Status: DISCONTINUED | OUTPATIENT
Start: 2020-03-31 | End: 2020-04-02 | Stop reason: HOSPADM

## 2020-03-31 RX ORDER — POTASSIUM CHLORIDE 20 MEQ/15ML
40 SOLUTION ORAL EVERY 4 HOURS
Status: COMPLETED | OUTPATIENT
Start: 2020-03-31 | End: 2020-03-31

## 2020-03-31 RX ORDER — DEXTROSE MONOHYDRATE, SODIUM CHLORIDE, AND POTASSIUM CHLORIDE 50; 1.49; 4.5 G/1000ML; G/1000ML; G/1000ML
INJECTION, SOLUTION INTRAVENOUS CONTINUOUS
Status: DISCONTINUED | OUTPATIENT
Start: 2020-04-01 | End: 2020-04-01

## 2020-03-31 RX ORDER — SODIUM CHLORIDE 0.9 % (FLUSH) 0.9 %
10 SYRINGE (ML) INJECTION
Status: DISCONTINUED | OUTPATIENT
Start: 2020-03-31 | End: 2020-04-02 | Stop reason: HOSPADM

## 2020-03-31 RX ORDER — SODIUM CHLORIDE 9 MG/ML
1000 INJECTION, SOLUTION INTRAVENOUS CONTINUOUS
Status: DISCONTINUED | OUTPATIENT
Start: 2020-03-31 | End: 2020-03-31

## 2020-03-31 RX ORDER — IBUPROFEN 200 MG
24 TABLET ORAL
Status: DISCONTINUED | OUTPATIENT
Start: 2020-03-31 | End: 2020-04-02 | Stop reason: HOSPADM

## 2020-03-31 RX ORDER — DEXTROSE MONOHYDRATE 100 MG/ML
25 INJECTION, SOLUTION INTRAVENOUS
Status: DISCONTINUED | OUTPATIENT
Start: 2020-03-31 | End: 2020-04-02 | Stop reason: HOSPADM

## 2020-03-31 RX ORDER — ACETAMINOPHEN 500 MG
1000 TABLET ORAL EVERY 8 HOURS PRN
Status: DISCONTINUED | OUTPATIENT
Start: 2020-03-31 | End: 2020-04-02 | Stop reason: HOSPADM

## 2020-03-31 RX ORDER — LEVOFLOXACIN 750 MG/1
750 TABLET ORAL DAILY
Status: DISCONTINUED | OUTPATIENT
Start: 2020-04-01 | End: 2020-03-31

## 2020-03-31 RX ORDER — DEXTROSE MONOHYDRATE 100 MG/ML
12.5 INJECTION, SOLUTION INTRAVENOUS
Status: DISCONTINUED | OUTPATIENT
Start: 2020-03-31 | End: 2020-04-02 | Stop reason: HOSPADM

## 2020-03-31 RX ORDER — CEFTRIAXONE 1 G/1
1 INJECTION, POWDER, FOR SOLUTION INTRAMUSCULAR; INTRAVENOUS
Status: DISCONTINUED | OUTPATIENT
Start: 2020-03-31 | End: 2020-03-31

## 2020-03-31 RX ORDER — ENOXAPARIN SODIUM 100 MG/ML
40 INJECTION SUBCUTANEOUS EVERY 24 HOURS
Status: DISCONTINUED | OUTPATIENT
Start: 2020-03-31 | End: 2020-04-02 | Stop reason: HOSPADM

## 2020-03-31 RX ORDER — DEXTROSE MONOHYDRATE AND SODIUM CHLORIDE 5; .45 G/100ML; G/100ML
INJECTION, SOLUTION INTRAVENOUS CONTINUOUS
Status: DISCONTINUED | OUTPATIENT
Start: 2020-03-31 | End: 2020-03-31

## 2020-03-31 RX ORDER — AZITHROMYCIN 250 MG/1
250 TABLET, FILM COATED ORAL DAILY
Status: DISCONTINUED | OUTPATIENT
Start: 2020-03-31 | End: 2020-03-31

## 2020-03-31 RX ORDER — POTASSIUM CHLORIDE 7.45 MG/ML
10 INJECTION INTRAVENOUS
Status: COMPLETED | OUTPATIENT
Start: 2020-03-31 | End: 2020-03-31

## 2020-03-31 RX ORDER — IBUPROFEN 200 MG
16 TABLET ORAL
Status: DISCONTINUED | OUTPATIENT
Start: 2020-03-31 | End: 2020-04-02 | Stop reason: HOSPADM

## 2020-03-31 RX ORDER — SODIUM CHLORIDE, SODIUM LACTATE, POTASSIUM CHLORIDE, CALCIUM CHLORIDE 600; 310; 30; 20 MG/100ML; MG/100ML; MG/100ML; MG/100ML
INJECTION, SOLUTION INTRAVENOUS CONTINUOUS
Status: DISCONTINUED | OUTPATIENT
Start: 2020-03-31 | End: 2020-03-31

## 2020-03-31 RX ORDER — ACETAMINOPHEN 325 MG/1
650 TABLET ORAL EVERY 4 HOURS PRN
Status: DISCONTINUED | OUTPATIENT
Start: 2020-03-31 | End: 2020-03-31

## 2020-03-31 RX ORDER — LEVOTHYROXINE SODIUM 100 UG/1
100 TABLET ORAL DAILY
Status: DISCONTINUED | OUTPATIENT
Start: 2020-03-31 | End: 2020-04-02 | Stop reason: HOSPADM

## 2020-03-31 RX ADMIN — MONTELUKAST 10 MG: 10 TABLET, FILM COATED ORAL at 10:03

## 2020-03-31 RX ADMIN — SODIUM CHLORIDE 1000 ML: 0.9 INJECTION, SOLUTION INTRAVENOUS at 12:03

## 2020-03-31 RX ADMIN — SODIUM PHOSPHATE, MONOBASIC, MONOHYDRATE 30 MMOL: 276; 142 INJECTION, SOLUTION INTRAVENOUS at 06:03

## 2020-03-31 RX ADMIN — SODIUM CHLORIDE, SODIUM LACTATE, POTASSIUM CHLORIDE, AND CALCIUM CHLORIDE: .6; .31; .03; .02 INJECTION, SOLUTION INTRAVENOUS at 04:03

## 2020-03-31 RX ADMIN — LEVOTHYROXINE SODIUM 100 MCG: 100 TABLET ORAL at 08:03

## 2020-03-31 RX ADMIN — POTASSIUM CHLORIDE 40 MEQ: 20 SOLUTION ORAL at 06:03

## 2020-03-31 RX ADMIN — SODIUM CHLORIDE 2 UNITS/HR: 9 INJECTION, SOLUTION INTRAVENOUS at 01:03

## 2020-03-31 RX ADMIN — POTASSIUM CHLORIDE 40 MEQ: 20 SOLUTION ORAL at 03:03

## 2020-03-31 RX ADMIN — POTASSIUM CHLORIDE, DEXTROSE MONOHYDRATE AND SODIUM CHLORIDE: 150; 5; 450 INJECTION, SOLUTION INTRAVENOUS at 11:03

## 2020-03-31 RX ADMIN — POTASSIUM CHLORIDE 40 MEQ: 20 SOLUTION ORAL at 10:03

## 2020-03-31 RX ADMIN — CEFTRIAXONE SODIUM 1 G: 1 INJECTION, POWDER, FOR SOLUTION INTRAMUSCULAR; INTRAVENOUS at 01:03

## 2020-03-31 RX ADMIN — ALUMINUM HYDROXIDE, MAGNESIUM HYDROXIDE, AND SIMETHICONE 50 ML: 200; 200; 20 SUSPENSION ORAL at 03:03

## 2020-03-31 RX ADMIN — MONTELUKAST 10 MG: 10 TABLET, FILM COATED ORAL at 01:03

## 2020-03-31 RX ADMIN — ACETAMINOPHEN 650 MG: 325 TABLET ORAL at 01:03

## 2020-03-31 RX ADMIN — POTASSIUM CHLORIDE 10 MEQ: 10 INJECTION, SOLUTION INTRAVENOUS at 06:03

## 2020-03-31 RX ADMIN — SODIUM PHOSPHATE, MONOBASIC, MONOHYDRATE 30 MMOL: 276; 142 INJECTION, SOLUTION INTRAVENOUS at 03:03

## 2020-03-31 RX ADMIN — POTASSIUM CHLORIDE 10 MEQ: 10 INJECTION, SOLUTION INTRAVENOUS at 07:03

## 2020-03-31 RX ADMIN — AZITHROMYCIN 250 MG: 250 TABLET, FILM COATED ORAL at 08:03

## 2020-03-31 RX ADMIN — ENOXAPARIN SODIUM 40 MG: 100 INJECTION SUBCUTANEOUS at 10:03

## 2020-03-31 RX ADMIN — POTASSIUM CHLORIDE AND SODIUM CHLORIDE: 900; 150 INJECTION, SOLUTION INTRAVENOUS at 03:03

## 2020-03-31 RX ADMIN — POTASSIUM CHLORIDE 10 MEQ: 10 INJECTION, SOLUTION INTRAVENOUS at 05:03

## 2020-03-31 RX ADMIN — DEXTROSE AND SODIUM CHLORIDE: 5; .45 INJECTION, SOLUTION INTRAVENOUS at 12:03

## 2020-03-31 RX ADMIN — POTASSIUM CHLORIDE 10 MEQ: 10 INJECTION, SOLUTION INTRAVENOUS at 03:03

## 2020-03-31 NOTE — ED NOTES
Contacted  Be w/ information on pts transfer to main campus and room number. Also answered questions about pts current status and plan of care.

## 2020-03-31 NOTE — CONSULTS
[facility]  Endocrinology  Diabetes Consult Note    Consult Requested by: Solange Barney*   Reason for admit: Suspected 2019 Novel Coronavirus Infection    HISTORY OF PRESENT ILLNESS:  Reason for Consult: Management of T1DM, Hyperglycemia, DKA    Diabetes diagnosis year: 1980s    Home Diabetes Medications:   Insulin pump 670g with Dexcom g6  Invokana     How often checking glucose at home? On Dexcom  BG readings on regimen: Starting Friday: High >400s  Hypoglycemia on the regimen?  No  Missed doses on regimen?  No    Diabetes Complications include:     Hyperglycemia    Complicating diabetes co morbidities:   n/a      HPI:   This is a 54 year old female with type I DM (on insulin pump) and hypothyroidism, who was admitted to [facility] on 3/28 and transferred to [facility] on 3/30 for DKA, glucose was 545, lactic acid of 6.5, bicarb <5, and 3+ ketones in her urine. She also had leukocytosis with WBC 27k. She was treated for pneumonia, COVID-19 which was negative but treated as if COVID-19 positive. Pt report since Friday, she has had GI issue with N/V/D with short of breath and feeling of unwell. She has not had any fevers. She was transfer as there were no ICU beds in [place]. Dr Osborne manages her diabetes, reported last A1C was 7.5 range,    Pt denies any recent pump issues. Insulin pump was evaluated by me outside of her room but due to missing a reservoir (pt does not have it on her), we were unable to access her pump settings due to this. Pt report basal rate of 1.25 u/hr. She report 1:10 carb ratio.     Info was provided by patient via Telephone Call, to minimized contact due to COVID suspicion     Interval HPI:   Transfer from [place] to [facility] for management of DKA. She feels better today, no nausea, request a liquid diet    PMH, PSH, FH, SH updated and reviewed     ROS:     Review of Systems   Constitutional: Positive for fatigue. Negative for activity change, diaphoresis, fever and  unexpected weight change.   HENT: Negative for trouble swallowing.    Respiratory: Positive for shortness of breath.    Cardiovascular: Negative for chest pain.   Gastrointestinal: Negative for abdominal pain, constipation, diarrhea, nausea and vomiting.   Endocrine: Negative for polyuria.   Genitourinary: Negative for difficulty urinating.   Musculoskeletal: Negative for back pain and myalgias.   Skin: Negative for rash.   Neurological: Positive for weakness. Negative for numbness and headaches.   Psychiatric/Behavioral: Negative for confusion.         Current Medications and/or Treatments Impacting Glycemic Control  Immunotherapy:    Immunosuppressants     None        Steroids:   Hormones (From admission, onward)    Start     Stop Route Frequency Ordered    03/31/20 0059  melatonin tablet 6 mg      -- Oral Nightly PRN 03/31/20 0007        Pressors:    Autonomic Drugs (From admission, onward)    None        Hyperglycemia/Diabetes Medications:   Antihyperglycemics (From admission, onward)    Start     Stop Route Frequency Ordered    03/31/20 0115  insulin regular 100 Units in sodium chloride 0.9% 100 mL infusion     Question:  Insulin Rate Adjustment (DO NOT MODIFY ANSWER)  Answer:  [email or web address] [id number]    -- IV Continuous 03/31/20 0013             PHYSICAL EXAMINATION:  Vitals:    03/31/20 0850   BP: (!) 109/56   Pulse: 82   Resp: 19   Temp: 98.7 °F (37.1 °C)     Body mass index is 24.65 kg/m².    Physical Exam   Physical exam was not done due to COVID suspicion       Labs Reviewed and Include   Recent Labs   Lab 03/31/20  0122  03/31/20  1127   *   < > 167*   CALCIUM 8.3*   < > 8.3*   ALBUMIN 3.1*  --   --    PROT 5.8*  --   --       < > 140   K 3.1*   < > 3.3*   CO2 9*   < > 14*   *   < > 116*   BUN 15   < > 13   CREATININE 1.1   < > 1.0   ALKPHOS 104  --   --    ALT 17  --   --    AST 17  --   --    BILITOT 0.8  --   --     < > = values in  this interval not displayed.     Lab Results   Component Value Date    WBC 15.85 (H) 03/31/2020    HGB 13.3 03/31/2020    HCT 39.8 03/31/2020    MCV 91 03/31/2020     03/31/2020     No results for input(s): TSH, FREET4 in the last 168 hours.  Lab Results   Component Value Date    HGBA1C 7.6 (H) 03/30/2020       Nutritional status:   Body mass index is 24.65 kg/m².  Lab Results   Component Value Date    ALBUMIN 3.1 (L) 03/31/2020    ALBUMIN 3.8 03/30/2020    ALBUMIN 3.4 (L) 03/29/2020     No results found for: PREALBUMIN    Estimated Creatinine Clearance: 56.5 mL/min (based on SCr of 1 mg/dL).    Accu-Checks  Recent Labs     03/30/20  2317 03/31/20  0012 03/31/20  0107 03/31/20  0211 03/31/20  0405 03/31/20  0609 03/31/20  0830 03/31/20  1048 03/31/20  1154 03/31/20  1243   POCTGLUCOSE 222* 224* 228* 254* 277* 256* 201* 158* 177* 166*        ASSESSMENT and PLAN    * Suspected 2019 Novel Coronavirus Infection  - has been shown to lead to DKA   - management per primary team      Primary atypical pneumonia  - on ABx      Type 1 diabetes mellitus with complications  - Type 1 DM on insulin pump with DKA at this time  - Pump is being manage by Dr Osborne in [facility]  - See DKA management section  - Off her insulin pump  - Once clinically stable, will restart her on transitional drip and carb ratio with meals      Diabetic ketoacidosis without coma associated with type 1 diabetes mellitus  - Unclear etiology of her DKA, could be COVID vs pump malfunction vs other illness (gastritis?) , she was then taken off insulin drip prematurely (placed on MDI on 3/29) leading to another episode of DKA  - At this time still with Bicarb of 10, with anion gap 15  - Hospital medicine to continue manage DKA      - Recommend continuing DKA insulin drip protocol, and Q1hr POCT glucose  - Recommend starting D5 1/2 NS, rate per hospital medicine team  - Recommend checking lab work in Q6hr  - Recommend aggressive electrolytes replacement  - Once  Bicarb >18, Anion gap is <10 and Patient ABLE TO TOLERATE food without nausea/vomiting, we will convert to transitional drip at that time, [facility] endocrine fellow on call for transitional drip order  - Start clear liquid sugar free diet for now  - We will continue to follow along  - Call if there are questions      Hypothyroidism due to Hashimoto's thyroiditis  - continue home dose, LT4 100mcg daily  - Last TSH was 4 mo and was WNL          Plan discussed with patient, family, and RN at bedside.     West Patel MD  Endocrinology  [facility], India Wallace MD,  have personally taken the history and examined the patient and agree with the resident's note as stated above.      Avoid restarting invokana

## 2020-03-31 NOTE — ASSESSMENT & PLAN NOTE
- Type 1 DM on insulin pump with DKA at this time  - Pump is being manage by Dr Osborne in [facility]  - See DKA management section  - Off her insulin pump  - Once clinically stable, will restart her on transitional drip and carb ratio with meals

## 2020-03-31 NOTE — PLAN OF CARE
VSS. A/Ox4.  No complaints of pain.  Patient does complain of frequency, urine samples have been sent to the lab.  Insulin infusing currently at 2 units/hr.  NS+20K infusing at 100 mL/hr.  Flu swab sent and came back negative.  COVID-19 swab sent to lab.  Patient on RA sating 100%.  No acute events overnight. Safety maintained.  All questions answered. Patient updated on plan of care.  Will continue to monitor.

## 2020-03-31 NOTE — SUBJECTIVE & OBJECTIVE
Interval HPI:   Transfer from [place] to [facility] NO for management of DKA. She feels better today, no nausea, request a liquid diet    PMH, PSH, FH, SH updated and reviewed     ROS:     Review of Systems   Constitutional: Positive for fatigue. Negative for activity change, diaphoresis, fever and unexpected weight change.   HENT: Negative for trouble swallowing.    Respiratory: Positive for shortness of breath.    Cardiovascular: Negative for chest pain.   Gastrointestinal: Negative for abdominal pain, constipation, diarrhea, nausea and vomiting.   Endocrine: Negative for polyuria.   Genitourinary: Negative for difficulty urinating.   Musculoskeletal: Negative for back pain and myalgias.   Skin: Negative for rash.   Neurological: Positive for weakness. Negative for numbness and headaches.   Psychiatric/Behavioral: Negative for confusion.         Current Medications and/or Treatments Impacting Glycemic Control  Immunotherapy:    Immunosuppressants     None        Steroids:   Hormones (From admission, onward)    Start     Stop Route Frequency Ordered    03/31/20 0059  melatonin tablet 6 mg      -- Oral Nightly PRN 03/31/20 0007        Pressors:    Autonomic Drugs (From admission, onward)    None        Hyperglycemia/Diabetes Medications:   Antihyperglycemics (From admission, onward)    Start     Stop Route Frequency Ordered    03/31/20 0115  insulin regular 100 Units in sodium chloride 0.9% 100 mL infusion     Question:  Insulin Rate Adjustment (DO NOT MODIFY ANSWER)  Answer:  [email or web address] [id number]    -- IV Continuous 03/31/20 0013             PHYSICAL EXAMINATION:  Vitals:    03/31/20 0850   BP: (!) 109/56   Pulse: 82   Resp: 19   Temp: 98.7 °F (37.1 °C)     Body mass index is 24.65 kg/m².    Physical Exam   Physical exam was not done due to COVID suspicion

## 2020-03-31 NOTE — ASSESSMENT & PLAN NOTE
- Unclear etiology of her DKA, could be COVID vs pump malfunction vs other illness (gastritis?) , she was then taken off insulin drip prematurely (placed on MDI on 3/29) leading to another episode of DKA  - At this time still with Bicarb of 10, with anion gap 15  - Hospital medicine to continue manage DKA      - Recommend continuing DKA insulin drip protocol, and Q1hr POCT glucose  - Recommend starting D5 1/2 NS, rate per hospital medicine team  - Recommend checking lab work in Q6hr  - Recommend aggressive electrolytes replacement  - Once Bicarb >18, Anion gap is <10 and Patient ABLE TO TOLERATE food without nausea/vomiting, we will convert to transitional drip at that time, [facility] endocrine fellow on call for transitional drip order  - Start clear liquid sugar free diet for now  - We will continue to follow along  - Call if there are questions

## 2020-03-31 NOTE — PLAN OF CARE
03/31/20 1614   Post-Acute Status   Post-Acute Authorization Other   Other Status No Post-Acute Service Needs   Discharge Delays None known at this time

## 2020-03-31 NOTE — H&P
Attestation signed by Solange Barney MD at 3/31/2020 4:53 PM     54 year old female with type I DM (on insulin pump) and hypothyroidism, who was admitted to [facility] on 3/28 and transferred to [facility] on 3/30 for DKA. Found to be COVID-19 negative x 2 (3/31, 3/28). Isolation precautions discontinued due to low suspicion for viral infection. No longer in DKA per most recent labs, but remains on insulin gtt due to BG >300. Endo following. Pt with epigastric pain/tenderness and nausea with lipase elevated at 308 consistent with acute pancreatitis. Started on LR 200cc/hr. Plan to continue supportive care and advance diet as tolerated. Day 3 abx for CAP. Will de-escalate to levaquin.     I have seen the patient, reviewed the Resident's history and physical, assessment and plan. I have personally interviewed and examined the patient at bedside and: agree with the findings.               [facility] Medicine  History and Physical  [facility]      Patient Name: Halle Spence  MRN:  [id number]  Hospital Medicine Team: Networked reference to record PCT  Yashira Gannon DO  Date of Admission:  3/30/2020     Length of Stay:  LOS: 1 day     Principal Problem: Suspected Covid-19 Virus Infection    Chief complaint    Nausea    HPI    Ms. Halle Spence is a 54 year old female with type I DM (on insulin pump) and hypothyroidism, who was admitted to [facility] on 3/28 and transferred to [facility] on 3/30 for DKA.     Ms. Spence recently checked her mother out of her nursing home and brought her home after other patients at the NH were positive for COVID. Her mother has felt well and has not had any fever or cough. Ms. Spence first began to feel ill on 3/27, with nausea, vomiting, and abdominal cramping. She presented to the [facility] ED and was found to be in DKA, with glucose 545, lactic acid of 6.5, bicarb <5, and 3+ ketones in her urine. She also had leukocytosis with WBC 27k. She was  treated for pneumonia with vancomycin and zosyn initially, then de-escalated to azithromycin alone. Her CTA chest revealed mild dependent atelectatic/consolidative changes in the lower lobes. She did report a cough at that time and was checked for COVID-19 which was negative. She was given Plaquenil at [place] for high suspicion of COVID-19 despite negative result.  Flu was also negative, and urinalysis was unremarkable.     At [facility], she was boarded in the ED for two days, as there were no ICU beds available, and they are unable to treat DKA on the floor. The patient was initially taken off insulin gtt on 3/29, but had to be placed back on gtt after she went back into DKA. Since arrival, Ms. Spence reports that her nausea has improved, but she still feels somewhat short of breath and unwell. She has not had any fevers.       Review of Systems    Constitutional: Negative for fever, chills. Positive for fatigue, poor appetite   HENT: Negative for sore throat, negative for trouble swallowing.    Eyes: Negative for photophobia, visual disturbance.   Respiratory: Negative for cough. Positive for shortness of breath  Cardiovascular: Negative for chest pain, palpitations, leg swelling.   Gastrointestinal: Negative for diarrhea. Negative for abdominal pain, constipation. Positive for nausea, vomiting.  Endocrine: Negative for cold intolerance, heat intolerance.   Genitourinary: Negative for dysuria, frequency.   Musculoskeletal: Negative for arthralgias, myalgias.   Skin: Negative for rash  Neurological: Negative for dizziness, syncope  Psychiatric/Behavioral: Negative for confusion, hallucinations, anxiety    Past Medical History:   Diagnosis Date    Allergic rhinitis     Diabetes mellitus type 1     Hypothyroidism due to Hashimoto's thyroiditis     Pes planus      Past Surgical History:   Procedure Laterality Date    APPENDECTOMY      FOOT SURGERY      HAND SURGERY      HYSTERECTOMY       Family History    Problem Relation Age of Onset    Atrial fibrillation Mother     Heart disease Mother     Diabetes Father     Diabetes Maternal Grandfather     Diabetes Paternal Grandfather      Social History     Socioeconomic History    Marital status:      Spouse name: Not on file    Number of children: Not on file    Years of education: Not on file    Highest education level: Not on file   Occupational History    Not on file   Social Needs    Financial resource strain: Not hard at all    Food insecurity:     Worry: Never true     Inability: Never true    Transportation needs:     Medical: No     Non-medical: No   Tobacco Use    Smoking status: Never Smoker    Smokeless tobacco: Never Used   Substance and Sexual Activity    Alcohol use: Yes     Frequency: 2-4 times a month     Drinks per session: 1 or 2     Binge frequency: Never     Comment: occasional wine    Drug use: Not on file    Sexual activity: Not on file   Lifestyle    Physical activity:     Days per week: 2 days     Minutes per session: 10 min    Stress: To some extent   Relationships    Social connections:     Talks on phone: More than three times a week     Gets together: Once a week     Attends Muslim service: Not on file     Active member of club or organization: No     Attends meetings of clubs or organizations: Never     Relationship status:    Other Topics Concern    Not on file   Social History Narrative    Not on file       Medications  Current Facility-Administered Medications on File Prior to Encounter   Medication Dose Route Frequency Provider Last Rate Last Dose    [COMPLETED] sodium chloride 0.9% bolus 1,000 mL  1,000 mL Intravenous Once Essence Saleem MD   Stopped at 03/30/20 0750    [COMPLETED] sodium chloride 0.9% bolus 1,000 mL  1,000 mL Intravenous ED 1 Time Michelle Jimenez MD   Stopped at 03/30/20 1050    [DISCONTINUED] 0.9%  NaCl infusion   Intravenous Continuous Michelle Jimenez MD    Stopped at 03/30/20 0806    [DISCONTINUED] acetaminophen tablet 650 mg  650 mg Oral Q6H PRN Michelle Jimenez MD   650 mg at 03/30/20 1703    [DISCONTINUED] albuterol inhaler 2 puff  2 puff Inhalation Q6H PRN Michelle Jimenez MD        [DISCONTINUED] azithromycin tablet 250 mg  250 mg Oral Daily Michelle Jimenez MD   250 mg at 03/30/20 0907    [DISCONTINUED] dextrose 10 % infusion  1,000 mL Intravenous PRN Bijal Yun NP        [DISCONTINUED] dextrose 10 % infusion  1,000 mL Intravenous PRN Bijal Yun NP        [DISCONTINUED] dextrose 10 % infusion  1,000 mL Intravenous PRN Michelle Jimenez MD        [DISCONTINUED] dextrose 10 % infusion  1,000 mL Intravenous PRN Michelle Jimenez MD        [DISCONTINUED] dextrose 10% (D10W) Bolus  12.5 g Intravenous PRN Bijal Yun NP        [DISCONTINUED] dextrose 10% (D10W) Bolus  25 g Intravenous PRN Essence Saleem MD        [DISCONTINUED] dextrose 5 % and 0.45 % NaCl infusion   Intravenous Continuous Michelle Jimenez  mL/hr at 03/30/20 1647      [DISCONTINUED] dextrose 50 % in water (D50W) injection 12.5 g  12.5 g Intravenous PRN Michelle Jimenez MD        [DISCONTINUED] dextrose 50 % in water (D50W) injection 25 g  25 g Intravenous PRN Michelle Jimenez MD        [DISCONTINUED] enoxaparin injection 30 mg  30 mg Subcutaneous Daily Michelle Jimenez MD   30 mg at 03/30/20 2052    [DISCONTINUED] hydroxychloroquine tablet 400 mg  400 mg Oral Daily Michelle Jimenez MD   400 mg at 03/30/20 1401    [DISCONTINUED] hydroxychloroquine tablet 400 mg  400 mg Oral Daily Michelle Jimenez MD        [DISCONTINUED] insulin aspart U-100 pen 1-10 Units  1-10 Units Subcutaneous QID (AC + HS) PRN Bijal Yun NP   4 Units at 03/29/20 2255    [DISCONTINUED] insulin detemir U-100 pen 10 Units  10 Units Subcutaneous Daily Essence Saleem MD   10 Units at 03/29/20 1051    [DISCONTINUED] insulin regular 100 Units  in sodium chloride 0.9% 100 mL infusion  2.5 Units/hr Intravenous Continuous Michelle Jimenez MD 1.3 mL/hr at 03/30/20 1410 1.25 Units/hr at 03/30/20 1410    [DISCONTINUED] levothyroxine tablet 100 mcg  100 mcg Oral Daily Bijal Yun NP   100 mcg at 03/30/20 0907    [DISCONTINUED] melatonin tablet 6 mg  6 mg Oral Nightly PRN Michelle Jimenez MD        [DISCONTINUED] ondansetron injection 4 mg  4 mg Intravenous Q6H PRN Michelle Jimenez MD   4 mg at 03/30/20 0457    [DISCONTINUED] pantoprazole EC tablet 40 mg  40 mg Oral BID Michelle Jimenez MD   40 mg at 03/30/20 2052    [DISCONTINUED] piperacillin-tazobactam 4.5 g in dextrose 5 % 100 mL IVPB (ready to mix system)  4.5 g Intravenous Q8H Michelle Jimenez MD   Stopped at 03/30/20 1432    [DISCONTINUED] sodium bicarbonate 1 mEq/mL (8.4 %) 150 mEq in dextrose 5 % 1,000 mL infusion   Intravenous Continuous Essence EVERETT Saleem MD   Stopped at 03/30/20 0800    [DISCONTINUED] sodium chloride 0.9% flush 10 mL  10 mL Intravenous PRN Michelle Jimenez MD        [DISCONTINUED] vancomycin in dextrose 5 % 1 gram/250 mL IVPB 1,000 mg  1,000 mg Intravenous Q24H Michelle Jimenez MD   Stopped at 03/29/20 2215     Current Outpatient Medications on File Prior to Encounter   Medication Sig Dispense Refill    HUMALOG U-100 INSULIN 100 unit/mL injection USE WITH INSULIN PUMP MAX  UNITS PER DAY  4    INVOKANA 100 mg Tab TK 1 T PO QD  10    levothyroxine (SYNTHROID) 100 MCG tablet Take 100 mcg by mouth once daily.      montelukast (SINGULAIR) 10 mg tablet Take 10 mg by mouth every evening.         Allergies  Bactrim [sulfamethoxazole-trimethoprim] and Codeine    Physical Examination  Temp:  [97.8 °F (36.6 °C)-99.3 °F (37.4 °C)]   Pulse:  []   Resp:  [19-38]   BP: (102-143)/(51-71)   SpO2:  [98 %-100 %]     Gen: NAD, conversant  Head: NC, AT  Eyes: EOMI. Sclera anicteric  Throat: Mucosa tacky, OP clear  CV: RRR, no M/R/G, no peripheral  edema  Resp: clear breath sounds to auscultation. No rales, rhonchi, or wheezing  GI: Soft, mildly tender throughout, ND, +BS  Ext: MAEW, no c/c/e  Neuro: AAOx3, CN grossly intact, no focal neurologic deficits  Psychiatry: Normal mood, normal affect    Laboratory:  Recent Labs   Lab 03/28/20 1758 03/30/20  1051   WBC 27.63* 23.66*   LYMPH 7.7*  2.1 4.0*  0.9*   HGB 16.2* 13.7   HCT 52.5* 43.7    279     Recent Labs   Lab 03/28/20 1911 03/30/20  1051 03/30/20  1450 03/30/20  1657      < > 143 140 141   K 5.4*   < > 3.5 3.6 3.5   *   < > 114* 117* 118*   CO2 <5*   < > 5* 7* 8*   BUN 25*   < > 18 18 18   CREATININE 1.5*   < > 1.3 1.2 1.2   *   < > 321* 235* 169*   CALCIUM 8.8   < > 8.6* 8.3* 8.6*   MG 2.6  --  2.1  --   --    PHOS  --   --  4.0  --   --     < > = values in this interval not displayed.     Recent Labs   Lab 03/28/20 1758 03/29/20  0807 03/29/20  1337 03/30/20  0735   ALKPHOS  --    < > 105 96 123   ALT  --    < > 17 15 21   AST  --    < > 22 17 20   ALBUMIN  --    < > 3.5 3.4* 3.8   PROT  --    < > 6.5 5.9* 7.0   BILITOT  --    < > 0.6 0.9 0.6   INR 0.9  --   --   --   --     < > = values in this interval not displayed.      Recent Labs     03/28/20 1758 03/28/20 1911 03/28/20  2223  03/29/20  0137 03/30/20  1051   DDIMER 1.13*  --   --   --   --   --    FERRITIN  --  530*  --   --   --   --    CRP  --   --   --   --  56.3*  --    LDH  --   --   --    < > 308* 220   BNP 44  --   --   --   --   --    TROPONINI 0.006  --   --   --   --   --    LACTATE 6.5*  --  3.0*  --   --   --     < > = values in this interval not displayed.       All labs within the last 24 hours were reviewed.     Microbiology:  Lab Results   Component Value Date    [id number] Not Detected 03/28/2020       Microbiology Results (last 7 days)     Procedure Component Value Units Date/Time    Influenza A & B by Molecular [[id number]]     Order Status:  No result Specimen:  Nasopharyngeal Swab     Blood  culture (site 1) [[id number]]     Order Status:  Sent Specimen:  Blood     Blood culture (site 2) [[id number]]     Order Status:  Sent Specimen:  Blood             Imaging      No results found for this or any previous visit.    X-Ray Chest AP Portable  Narrative: EXAMINATION:  XR CHEST AP PORTABLE    CLINICAL HISTORY:  PNA;    TECHNIQUE:  Single frontal view of the chest was performed.    COMPARISON:  Chest CT: 03/29/2020.    Chest radiograph: 03/28/2020.    FINDINGS:  Mediastinal structures are midline. Cardiac silhouette and pulmonary vascular distribution are normal.    Lung volumes are normal and symmetric.    Recent chest CT and chest radiograph revealed mild patchy opacities in the lower lung zones.  Similar subsegmental opacities are present in the lower lung zones on today's examination.  Pneumonia is not excluded but radiographic appearance is similar to recent studies suggesting indolent course.  I detect no new pulmonary disease.    I detect no pleural fluid, lymph node enlargement, cardiac decompensation, pneumothorax, pneumomediastinum, pneumoperitoneum or significant osseous abnormality.  Impression: Abnormal but stable appearance of the chest radiograph with modest patchy subsegmental opacities in the lower lung zones compatible with radiographically mild pneumonia.    Electronically signed by: Puja Fox MD  Date:    03/30/2020  Time:    08:44      All imaging within the last 24 hours was reviewed.       Assessment and Plan:    Active Hospital Problems    Diagnosis  POA    *Diabetic ketoacidosis without coma associated with type 1 diabetes mellitus [E10.10]  Yes    DKA (diabetic ketoacidosis) [E11.10]  Yes    Primary atypical pneumonia [J18.9]  Yes    Suspected 2019 Novel Coronavirus Infection [R68.89]  Yes    Hypothyroidism due to Hashimoto's thyroiditis [E03.8, E06.3]  Yes     Chronic      Resolved Hospital Problems   No resolved problems to display.       Suspected Covid-19 Virus  Infection  Person Under Investigation (PUI) for COVID-19  - COVID-19 testing: Collection Date: 3/28/2020 Collection Time:   6:15 PM  - Infection Control notified    - Isolation:   - Airborne and Droplet Precautions  - Surgical mask on patient   - N95 masks must be fit tested, wear eye protection  - 20 second hand hygiene   - Limit visitors per hospital policy   - Consolidate lab draws, nursing care, and interventions    - Diagnostics: (Lymphopenia, hyponatremia, hyperferritinemia, elevated troponin, elevated d-dimer, age, and comorbidities are significant predictors of poor clinical outcome)   - CBC:  WBC 23 trend Q48hrs  - CMP:       HAGMA with bicarb 8 trend Q48hrs  - Procalcitonin:  ordered  - D-dimer:  1.13 repeat prior to discharge  - Ferritin:  530 repeat prior to discharge   - CRP:        56.99 trend Q48hrs  - LDH:   220 repeat prior to discharge  - BNP:   44  - Troponin:  0.006   - ECG:   NSR at 86bpm. No ST segment changes.    - rapid Flu:  Negative   - RIP only if BMT/solid transplant: n/a   - Legionella antigen: ordered   - Blood culture x2: NGTD from 3/28   - Sputum culture: ordered   - CXR:   Patchy subsegmental opacities in lower lung zones   - UA and culture: Ordered    - CPK:   ordered    - Management:   Bundle care as able to maintain isolation & minimize in/out of room   - Supplemental O2 to maintain SpO2 92%-96%   if requiring 6L NC or higher, place on nonrebreather and discuss case with MICU   - Telemetry & continuous pulse oximetry    - If wheezing   - albuterol inhaler 2-4 puff Q6hr PRN    - ipratropium daily    - acetaminophen 650mg PO Q6hr PRN fever   - loperamide PRN for viral diarrhea  - Empiric antibiotics per likely source & patient allergies    - CAP: x 5 day course (d/c early if low concern for bacterial co-infection)  Ceftriaxone 1g IV Q24hrs            Azithromycin 500mg IV day #1, then 250mg PO daily x4 days     If azithromycin is not available, start doxycycline                 If  MRSA risk factors, add Vancomycin IV (PharmD consult)   - Investigational Treatment Protocol: (if patient meets criteria)   [email or web address]     - statin: atorvastatin 40mg po daily (if CPK WNL)    - start HCQ 400mg PO BID x1 day, then 400mg PO daily x 4 days   (check glucose 6 phosphate dehydrogenase (NOT G6PD Quant), ECG, and start Qshift POCT glucose)    Safety notes:   - Avoid NIPPV (CPAP/BiPAP) to prevent aerosolization, use on a case-by-case basis if in neg pressure room   - Cautious use of NSAIDS for fever per WHO recommendations (3/16/2020)   - No new ACEi/ARB start or discontinuation of chronic med unless hypotensive (Esler et al. Journal of Hypertension 2020, 38:000-000)   - Careful use of steroids in the absence of other indications (shock, ARDS)   - Fluid sparing resuscitation, avoid maintenance fluids     Leukocytosis  Community Acquired Pneumonia  Overall low suspicion for COVID-19 or pneumonia. However, given elevated inflammatory markers and her contact with mother (NH resident at facility with COVID positive patients), will check COVID-19 again, and empirically treat for CAP.   - ceftriaxone and azithromycin  - legionella sent  - respiratory cultures sent       Diabetic Ketoacidosis  High Anion Gap Metabolic Acidosis  Type 1 Diabetes Mellitus  Home meds: Invokana, insulin pump (uses 50 units every three days)  A1c 7.6  Patient has never been in DKA before  - insulin gtt per DKA protocol   - accuchecks q1h, may space out to q2h if rate is stable  - BMP, mag, phos q4h  - endocrinology consulted for assistance with Type I diabetic on insulin pump at home    Hypothyroidism  - continue home synthroid      Advance Care Planning  Goals of care, counseling/discussion   Advance Care Planning   Full Code           VTE High Risk Prophylaxis: enoxaparin 40mg sq QHS @ 2100 (bundled care) if GFR >30    Patient's  chronic/stable medical conditions noted in the assessment above will be managed with the patient's home medications as tolerated.     Yashira Gannon, DO  Internal Medicine, PGY-3

## 2020-03-31 NOTE — HPI
Reason for Consult: Management of T1DM, Hyperglycemia, DKA    Diabetes diagnosis year: 1980s    Home Diabetes Medications:   Insulin pump 670g with Dexcom g6  Invokana     How often checking glucose at home? On Dexcom  BG readings on regimen: Starting Friday: High >400s  Hypoglycemia on the regimen?  No  Missed doses on regimen?  No    Diabetes Complications include:     Hyperglycemia    Complicating diabetes co morbidities:   n/a      HPI:   This is a 54 year old female with type I DM (on insulin pump) and hypothyroidism, who was admitted to [facility] on 3/28 and transferred to [facility] on 3/30 for DKA, glucose was 545, lactic acid of 6.5, bicarb <5, and 3+ ketones in her urine. She also had leukocytosis with WBC 27k. She was treated for pneumonia, COVID-19 which was negative but treated as if COVID-19 positive. Pt report since Friday, she has had GI issue with N/V/D with short of breath and feeling of unwell. She has not had any fevers. She was transfer as there were no ICU beds in [place]. Dr Osborne manages her diabetes, reported last A1C was 7.5 range,    Pt denies any recent pump issues. Insulin pump was evaluated by me outside of her room but due to missing a reservoir (pt does not have it on her), we were unable to access her pump settings due to this. Pt report basal rate of 1.25 u/hr. She report 1:10 carb ratio.     Info was provided by patient via Telephone Call, to minimized contact due to COVID suspicion

## 2020-03-31 NOTE — NURSING
0442: Dr. Solano notified about 0400 blood sugar results.  Notified MD that IV where insulin was infusing was infiltrated but unsure for how long.  Insulin infusion switched to working IV.  MD stated to just recheck sugar in 2 hours (0600) with no changes to infusion.

## 2020-03-31 NOTE — PLAN OF CARE
Internal Medicine Telemed Plan of Care Note:    Called Halle Spence 's , Be Spence, to discuss the patient's current clinical status. Briefly, the patient was transferred to [facility] from [facility] because she was in DKA and there was no ICU bed availability. There were additional concerns for COVID-19, so she was tested both at [facility] and [facility]. COVID Status: negative, 03/28, 03/31.    Addressed family's questions and concerns in addition to updating the current clinical status of the patient. Explained that there were low suspicions for COVID-19 so isolation precautions were discontinued. Discussed further management of hyperglycemia with insulin gtt in addition to supportive tx for concomitant acute pancreatitis with IVF, pain control meds, and anti-emetic medications. Mr. Spence's questions were answered. He verbalized his understanding. Will continue ongoing communication.      Colleen León MD  Internal Medicine PGY-1  [facility]

## 2020-03-31 NOTE — ED NOTES
Per MD, no change to insulin dose or fluids at this time. Pt resting comfortably, respirations even and unlabored.

## 2020-03-31 NOTE — PLAN OF CARE
Vilma Milan MD   [address] / [place] [address]       [id number] - [place] - [address] AT [facility] [address]  [address] [address]  Phone: [phone] Fax: [phone]    Payor: [facility] / Plan: [facility] CHOICE PLUS / Product Type: Commercial /             03/31/20 1612   Discharge Assessment   Assessment Type Discharge Planning Assessment   Confirmed/corrected address and phone number on facesheet? Yes   Assessment information obtained from? Patient;Medical Record   Expected Length of Stay (days) 3   Communicated expected length of stay with patient/caregiver yes   Prior to hospitilization cognitive status: Alert/Oriented   Prior to hospitalization functional status: Independent   Current cognitive status: Alert/Oriented   Current Functional Status: Independent   Lives With spouse   Able to Return to Prior Arrangements yes   Is patient able to care for self after discharge? Yes   Who are your caregiver(s) and their phone number(s)? Be Spence (spouse) [phone]   Patient's perception of discharge disposition home or selfcare   Readmission Within the Last 30 Days no previous admission in last 30 days   Patient currently being followed by outpatient case management? No   Patient currently receives any other outside agency services? No   Equipment Currently Used at Home other (see comments)  (Insulin pump)   Do you have any problems affording any of your prescribed medications? No   Is the patient taking medications as prescribed? yes   Does the patient have transportation home? Yes   Transportation Anticipated family or friend will provide   Does the patient receive services at the [facility] Clinic? No   Discharge Plan A Home with family   Discharge Plan B Home Health   DME Needed Upon Discharge  none   Patient/Family in Agreement with Plan yes

## 2020-03-31 NOTE — PROVIDER TRANSFER
[facility] admissions ONLY: Please call extension [phone] upon patient arrival to floor for Hospital Medicine admit team assignment and for additional admit orders for the patient. Do not page the attending, staff physician or Advanced Practice Provider with the patient on arrival (may not be in-house at the time of arrival). Call back or wait to leave beeper number when prompted.     (Physician in Lead of Transfers)/Regional Referral Center Note    Patients name/MRN: Halle Spence/MRN [id number]    Referring Physician or Mid-Level provider giving report: Dr. Michelle Jimenez (Hospital Medicine)  Contact number: [phone]    Referral Facility: [facility]    Date/Time of Acceptance: 3/30/2020 7:04 PM    /Accepting:  Dr. Reva Woods     Accepting facility [facility] Stepdown Unit    Reason for transfer request: Needs transfer as on insulin drip for DKA and no ICU beds at [place] and unable to do insulin drips on the floor only ICU at [place]     Report from Physician/Mid-Level Provider/HPI: 53 y/o female with Type 1 diabetes on insulin pump at home and hypothyroidism who presented to the ED at [facility] on 3/28 with complaint of nausea and vomiting. Work up revealed patient in DKA and placed on insulin drip. Patient also with elevated WBC 27,000. Patient also reported headache, cough, SOB; chest x-ray concerning for pneumonia so swabbed for COVID and test has returned negative but highly concerned for COVID so placed on Plaquenil to treat. No confirmed COVID contacts, has elderly mother who resided in a local nursing home known to have a COVID outbreak, and patient brought mother home to live with her one week ago. Patient treated with insulin drip and AG closed and taken off drip on am of 3/29 and placed on subcutaneous insulin. Patient noted on this am to be SOB and not feeling well and labs checked, and patient noted to be back in DKA so restarted on insulin drip today, 3/30.  [facility] has no ICU beds and patient has been in ED since admit and transferring for capacity. Patient remains on COVID isolation as per Dr. Jimenez even though tested negative due to high suspicion for COVID infection. Patient initially on Vanc, Zosyn and Azithromycin for pneumonia but now on just Azithromycin. WBC still 23,000. Blood cultures negative.     VS: /71   Pulse 107   Temp 97.8 °F (36.6 °C) (Oral)   Resp (!) 32   Wt 68.5 kg (151 lb)   LMP 10/17/2003   SpO2 100%   BMI 25.13 kg/m²    Past Medical/Surgical History: See EPIC     Meds: See EPIC    Labs:   Lactate (Lactic Acid)   Date Value Ref Range Status   03/28/2020 3.0 (H) 0.5 - 2.2 mmol/L Final     Ferritin   Date Value Ref Range Status   03/28/2020 530 (H) 20.0 - 300.0 ng/mL Final     LD   Date Value Ref Range Status   03/30/2020 220 110 - 260 U/L Final     CRP   Date Value Ref Range Status   03/29/2020 56.3 (H) 0.0 - 8.2 mg/L Final     BNP   Date Value Ref Range Status   03/28/2020 44 0 - 99 pg/mL Final     D-Dimer   Date Value Ref Range Status   03/28/2020 1.13 (H) <0.50 mg/L FEU Final     Creatinine   Date Value Ref Range Status   03/30/2020 1.2 0.5 - 1.4 mg/dL Final     AST   Date Value Ref Range Status   03/30/2020 20 10 - 40 U/L Final     WBC   Date Value Ref Range Status   03/30/2020 23.66 (H) 3.90 - 12.70 K/uL Final     Hemoglobin   Date Value Ref Range Status   03/30/2020 13.7 12.0 - 16.0 g/dL Final     Platelets   Date Value Ref Range Status   03/30/2020 279 150 - 350 K/uL Final     SARS-CoV2 (COVID-19) Qualitative PCR   Date Value Ref Range Status   03/28/2020 Not Detected Not Detected Final     Imaging: See EPIC    To Do List upon arrival:     Admit to Stepdown to COVID team as patient on insulin drip   DKA as per pathway   Empiric antibiotics for CAP   Patient tested COVID negative but highly suspicious and remains in COVID isolation   Consult Endocrine to assist in management as patient on home insulin pump      Reva  MD Peggy  Senior Staff Physician  Department of Hospital Medicine  Patient Flow Center/   [phone]

## 2020-04-01 PROBLEM — E11.10 DKA (DIABETIC KETOACIDOSIS): Status: RESOLVED | Noted: 2020-03-30 | Resolved: 2020-04-01

## 2020-04-01 PROBLEM — E87.29 HIGH ANION GAP METABOLIC ACIDOSIS: Status: RESOLVED | Noted: 2020-03-28 | Resolved: 2020-04-01

## 2020-04-01 LAB
ALLENS TEST: ABNORMAL
ANION GAP SERPL CALC-SCNC: 12 MMOL/L (ref 8–16)
ANION GAP SERPL CALC-SCNC: 12 MMOL/L (ref 8–16)
ANION GAP SERPL CALC-SCNC: 9 MMOL/L (ref 8–16)
BUN SERPL-MCNC: 10 MG/DL (ref 6–20)
BUN SERPL-MCNC: 11 MG/DL (ref 6–20)
BUN SERPL-MCNC: 9 MG/DL (ref 6–20)
CALCIUM SERPL-MCNC: 7.8 MG/DL (ref 8.7–10.5)
CALCIUM SERPL-MCNC: 7.9 MG/DL (ref 8.7–10.5)
CALCIUM SERPL-MCNC: 8.3 MG/DL (ref 8.7–10.5)
CHLORIDE SERPL-SCNC: 112 MMOL/L (ref 95–110)
CHLORIDE SERPL-SCNC: 114 MMOL/L (ref 95–110)
CHLORIDE SERPL-SCNC: 114 MMOL/L (ref 95–110)
CO2 SERPL-SCNC: 14 MMOL/L (ref 23–29)
CO2 SERPL-SCNC: 15 MMOL/L (ref 23–29)
CO2 SERPL-SCNC: 20 MMOL/L (ref 23–29)
CREAT SERPL-MCNC: 0.7 MG/DL (ref 0.5–1.4)
CREAT SERPL-MCNC: 0.8 MG/DL (ref 0.5–1.4)
CREAT SERPL-MCNC: 0.8 MG/DL (ref 0.5–1.4)
CRP SERPL-MCNC: 26.7 MG/L (ref 0–8.2)
EST. GFR  (AFRICAN AMERICAN): >60 ML/MIN/1.73 M^2
EST. GFR  (NON AFRICAN AMERICAN): >60 ML/MIN/1.73 M^2
GLUCOSE SERPL-MCNC: 172 MG/DL (ref 70–110)
GLUCOSE SERPL-MCNC: 175 MG/DL (ref 70–110)
GLUCOSE SERPL-MCNC: 186 MG/DL (ref 70–110)
HCO3 UR-SCNC: 18.3 MMOL/L (ref 24–28)
MAGNESIUM SERPL-MCNC: 2 MG/DL (ref 1.6–2.6)
PCO2 BLDA: 28 MMHG (ref 35–45)
PH SMN: 7.42 [PH] (ref 7.35–7.45)
PHOSPHATE SERPL-MCNC: 1.3 MG/DL (ref 2.7–4.5)
PHOSPHATE SERPL-MCNC: 1.8 MG/DL (ref 2.7–4.5)
PHOSPHATE SERPL-MCNC: 2.4 MG/DL (ref 2.7–4.5)
PO2 BLDA: 73 MMHG (ref 40–60)
POC BE: -6 MMOL/L
POC SATURATED O2: 95 % (ref 95–100)
POC TCO2: 19 MMOL/L (ref 24–29)
POCT GLUCOSE: 188 MG/DL (ref 70–110)
POCT GLUCOSE: 201 MG/DL (ref 70–110)
POCT GLUCOSE: 206 MG/DL (ref 70–110)
POCT GLUCOSE: 226 MG/DL (ref 70–110)
POCT GLUCOSE: 229 MG/DL (ref 70–110)
POCT GLUCOSE: 286 MG/DL (ref 70–110)
POCT GLUCOSE: 402 MG/DL (ref 70–110)
POTASSIUM SERPL-SCNC: 3 MMOL/L (ref 3.5–5.1)
POTASSIUM SERPL-SCNC: 3.1 MMOL/L (ref 3.5–5.1)
POTASSIUM SERPL-SCNC: 3.2 MMOL/L (ref 3.5–5.1)
SAMPLE: ABNORMAL
SITE: ABNORMAL
SODIUM SERPL-SCNC: 140 MMOL/L (ref 136–145)
SODIUM SERPL-SCNC: 141 MMOL/L (ref 136–145)
SODIUM SERPL-SCNC: 141 MMOL/L (ref 136–145)

## 2020-04-01 PROCEDURE — 25000003 PHARM REV CODE 250: Performed by: STUDENT IN AN ORGANIZED HEALTH CARE EDUCATION/TRAINING PROGRAM

## 2020-04-01 PROCEDURE — 25000003 PHARM REV CODE 250: Performed by: HOSPITALIST

## 2020-04-01 PROCEDURE — 63600175 PHARM REV CODE 636 W HCPCS: Performed by: HOSPITALIST

## 2020-04-01 PROCEDURE — 99233 PR SUBSEQUENT HOSPITAL CARE,LEVL III: ICD-10-PCS | Mod: ,,, | Performed by: HOSPITALIST

## 2020-04-01 PROCEDURE — 63600175 PHARM REV CODE 636 W HCPCS: Performed by: INTERNAL MEDICINE

## 2020-04-01 PROCEDURE — 63600175 PHARM REV CODE 636 W HCPCS: Performed by: STUDENT IN AN ORGANIZED HEALTH CARE EDUCATION/TRAINING PROGRAM

## 2020-04-01 PROCEDURE — 86140 C-REACTIVE PROTEIN: CPT

## 2020-04-01 PROCEDURE — 80048 BASIC METABOLIC PNL TOTAL CA: CPT

## 2020-04-01 PROCEDURE — 80048 BASIC METABOLIC PNL TOTAL CA: CPT | Mod: 91

## 2020-04-01 PROCEDURE — 84100 ASSAY OF PHOSPHORUS: CPT

## 2020-04-01 PROCEDURE — 84100 ASSAY OF PHOSPHORUS: CPT | Mod: 91

## 2020-04-01 PROCEDURE — 20600001 HC STEP DOWN PRIVATE ROOM

## 2020-04-01 PROCEDURE — 36415 COLL VENOUS BLD VENIPUNCTURE: CPT

## 2020-04-01 PROCEDURE — 83735 ASSAY OF MAGNESIUM: CPT | Mod: 91

## 2020-04-01 PROCEDURE — 99233 SBSQ HOSP IP/OBS HIGH 50: CPT | Mod: ,,, | Performed by: HOSPITALIST

## 2020-04-01 PROCEDURE — 99232 PR SUBSEQUENT HOSPITAL CARE,LEVL II: ICD-10-PCS | Mod: ,,, | Performed by: INTERNAL MEDICINE

## 2020-04-01 PROCEDURE — 25000003 PHARM REV CODE 250: Performed by: INTERNAL MEDICINE

## 2020-04-01 PROCEDURE — 99900035 HC TECH TIME PER 15 MIN (STAT)

## 2020-04-01 PROCEDURE — 99232 SBSQ HOSP IP/OBS MODERATE 35: CPT | Mod: ,,, | Performed by: INTERNAL MEDICINE

## 2020-04-01 RX ORDER — INSULIN ASPART 100 [IU]/ML
1-10 INJECTION, SOLUTION INTRAVENOUS; SUBCUTANEOUS
Status: DISCONTINUED | OUTPATIENT
Start: 2020-04-02 | End: 2020-04-02 | Stop reason: HOSPADM

## 2020-04-01 RX ORDER — POTASSIUM CHLORIDE 20 MEQ/1
20 TABLET, EXTENDED RELEASE ORAL
Status: COMPLETED | OUTPATIENT
Start: 2020-04-01 | End: 2020-04-01

## 2020-04-01 RX ORDER — MORPHINE SULFATE 2 MG/ML
4 INJECTION, SOLUTION INTRAMUSCULAR; INTRAVENOUS EVERY 4 HOURS PRN
Status: DISCONTINUED | OUTPATIENT
Start: 2020-04-01 | End: 2020-04-01

## 2020-04-01 RX ORDER — INSULIN ASPART 100 [IU]/ML
1 INJECTION, SOLUTION INTRAVENOUS; SUBCUTANEOUS
Status: DISCONTINUED | OUTPATIENT
Start: 2020-04-01 | End: 2020-04-01

## 2020-04-01 RX ORDER — IBUPROFEN 200 MG
24 TABLET ORAL
Status: DISCONTINUED | OUTPATIENT
Start: 2020-04-01 | End: 2020-04-02 | Stop reason: HOSPADM

## 2020-04-01 RX ORDER — POTASSIUM CHLORIDE 20 MEQ/15ML
40 SOLUTION ORAL ONCE
Status: DISCONTINUED | OUTPATIENT
Start: 2020-04-01 | End: 2020-04-01

## 2020-04-01 RX ORDER — KETOROLAC TROMETHAMINE 30 MG/ML
15 INJECTION, SOLUTION INTRAMUSCULAR; INTRAVENOUS EVERY 6 HOURS PRN
Status: DISCONTINUED | OUTPATIENT
Start: 2020-04-01 | End: 2020-04-02 | Stop reason: HOSPADM

## 2020-04-01 RX ORDER — GLUCAGON 1 MG
1 KIT INJECTION
Status: DISCONTINUED | OUTPATIENT
Start: 2020-04-01 | End: 2020-04-02 | Stop reason: HOSPADM

## 2020-04-01 RX ORDER — INSULIN ASPART 100 [IU]/ML
0-5 INJECTION, SOLUTION INTRAVENOUS; SUBCUTANEOUS
Status: DISCONTINUED | OUTPATIENT
Start: 2020-04-01 | End: 2020-04-01

## 2020-04-01 RX ORDER — POTASSIUM CHLORIDE 20 MEQ/15ML
60 SOLUTION ORAL ONCE
Status: DISCONTINUED | OUTPATIENT
Start: 2020-04-01 | End: 2020-04-01

## 2020-04-01 RX ORDER — IBUPROFEN 200 MG
16 TABLET ORAL
Status: DISCONTINUED | OUTPATIENT
Start: 2020-04-01 | End: 2020-04-02 | Stop reason: HOSPADM

## 2020-04-01 RX ORDER — INSULIN ASPART 100 [IU]/ML
4 INJECTION, SOLUTION INTRAVENOUS; SUBCUTANEOUS
Status: DISCONTINUED | OUTPATIENT
Start: 2020-04-02 | End: 2020-04-02 | Stop reason: HOSPADM

## 2020-04-01 RX ORDER — PANTOPRAZOLE SODIUM 40 MG/1
40 TABLET, DELAYED RELEASE ORAL DAILY
Status: DISCONTINUED | OUTPATIENT
Start: 2020-04-01 | End: 2020-04-02 | Stop reason: HOSPADM

## 2020-04-01 RX ORDER — POTASSIUM CHLORIDE 20 MEQ/1
40 TABLET, EXTENDED RELEASE ORAL ONCE
Status: DISCONTINUED | OUTPATIENT
Start: 2020-04-01 | End: 2020-04-01

## 2020-04-01 RX ADMIN — MONTELUKAST 10 MG: 10 TABLET, FILM COATED ORAL at 08:04

## 2020-04-01 RX ADMIN — POTASSIUM CHLORIDE, DEXTROSE MONOHYDRATE AND SODIUM CHLORIDE: 150; 5; 450 INJECTION, SOLUTION INTRAVENOUS at 08:04

## 2020-04-01 RX ADMIN — ENOXAPARIN SODIUM 40 MG: 100 INJECTION SUBCUTANEOUS at 08:04

## 2020-04-01 RX ADMIN — POTASSIUM CHLORIDE, DEXTROSE MONOHYDRATE AND SODIUM CHLORIDE: 150; 5; 450 INJECTION, SOLUTION INTRAVENOUS at 11:04

## 2020-04-01 RX ADMIN — SODIUM CHLORIDE 1.6 UNITS/HR: 9 INJECTION, SOLUTION INTRAVENOUS at 11:04

## 2020-04-01 RX ADMIN — INSULIN ASPART 1 UNITS: 100 INJECTION, SOLUTION INTRAVENOUS; SUBCUTANEOUS at 11:04

## 2020-04-01 RX ADMIN — ACETAMINOPHEN 1000 MG: 500 TABLET ORAL at 08:04

## 2020-04-01 RX ADMIN — SODIUM CHLORIDE 2 UNITS/HR: 9 INJECTION, SOLUTION INTRAVENOUS at 06:04

## 2020-04-01 RX ADMIN — POTASSIUM CHLORIDE 20 MEQ: 20 TABLET, EXTENDED RELEASE ORAL at 01:04

## 2020-04-01 RX ADMIN — POTASSIUM CHLORIDE 20 MEQ: 20 TABLET, EXTENDED RELEASE ORAL at 11:04

## 2020-04-01 RX ADMIN — POTASSIUM PHOSPHATE, MONOBASIC AND POTASSIUM PHOSPHATE, DIBASIC 20 MMOL: 224; 236 INJECTION, SOLUTION, CONCENTRATE INTRAVENOUS at 11:04

## 2020-04-01 RX ADMIN — PANTOPRAZOLE SODIUM 40 MG: 40 TABLET, DELAYED RELEASE ORAL at 11:04

## 2020-04-01 RX ADMIN — INSULIN ASPART 1 UNITS: 100 INJECTION, SOLUTION INTRAVENOUS; SUBCUTANEOUS at 04:04

## 2020-04-01 RX ADMIN — LEVOTHYROXINE SODIUM 100 MCG: 100 TABLET ORAL at 08:04

## 2020-04-01 RX ADMIN — LEVOFLOXACIN 750 MG: 750 TABLET, FILM COATED ORAL at 08:04

## 2020-04-01 RX ADMIN — INSULIN ASPART 3 UNITS: 100 INJECTION, SOLUTION INTRAVENOUS; SUBCUTANEOUS at 10:04

## 2020-04-01 NOTE — PLAN OF CARE
Pt AAOX4. POC reviewed with pt who verbalized understanding. Pt is incontinent, UO adequate. BM x3. No complaints of pain. No complaints of N/V. Pt tolerating diet well. Insulin drip intact. BG q2. No adverse s/s noted, pt left in comfort with call light within reach. Frequent rounds made for safety. WCTM

## 2020-04-01 NOTE — DISCHARGE SUMMARY
[facility] Hospital Medicine  Discharge Summary      Patient Name: Halle Spence  MRN: [id number]  Admission Date: 3/28/2020  Hospital Length of Stay: 2 days  Discharge Date and Time: 3/30/2020  9:45 PM  Attending Physician: No att. providers found   Discharging Provider: Essence Saleem MD  Primary Care Provider: Vilma Milan MD      HPI:   54 y.o. female with past medical history of DM type 1 on insulin pump, who presents to the ED with complaint of nausea and vomiting.   Work up revealed DKA.  Patient also reported headache, cough, SOB; chest x-ray concerning for pneumonia.   No confirmed COVID contacts, has elderly mother who resided in a local nursing home known to have a COVID outbreak, and patient brought mother home to live with her one week ago.   Patient admitted for DKA, pneumonia, and suspected covid infection.    * No surgery found *      Hospital Course:   3/29/20- Anion gap closed, patient transitioned to SQ insulin and NS IV fluids. Seen by ID; on plaquenil for suspected COVID infection; also on empiric ABX for bacterial PNA.   3/30/20- patient went back into DKA overnight, restarting Insulin infusion protocol, upgrading to ICU level of care. COVID test returned NEG but high clinical suspicion and will continue to treat accordingly     Consults:   Consults (From admission, onward)        Status Ordering Provider     Inpatient consult to Infectious Diseases  Once     Provider:  (Not yet assigned)    Completed KATALINA KIM          * Diabetic ketoacidosis without coma associated with type 1 diabetes mellitus  DKA (diabetic ketoacidosis)    - back in DKA overnight after initial resolution  - restart insulin infusion/fluid protocol, Q 1 hour accuchecks  - awaiting repeat labs  - upgrade to ICU level of care    Hypothyroidism due to Hashimoto's thyroiditis  Continue Synthroid      Final Active Diagnoses:    Diagnosis Date Noted POA    PRINCIPAL PROBLEM:  Diabetic  ketoacidosis without coma associated with type 1 diabetes mellitus [E10.10] 03/28/2020 Yes    Diarrhea of presumed infectious origin [R19.7] 03/30/2020 No    Suspected 2019 Novel Coronavirus Infection [R68.89] 03/28/2020 Yes    Hypothyroidism due to Hashimoto's thyroiditis [E03.8, E06.3] 11/25/2019 Yes     Chronic      Problems Resolved During this Admission:    Diagnosis Date Noted Date Resolved POA    Esophagitis, acute [K20.9] 03/29/2020 03/30/2020 Yes    DKA (diabetic ketoacidosis) [E11.10] 03/28/2020 03/29/2020 Yes    Hyperkalemia [E87.5] 03/28/2020 03/29/2020 Yes    High anion gap metabolic acidosis [E87.2] 03/28/2020 04/01/2020 Yes    Lactic acidosis [E87.2] 03/28/2020 03/29/2020 Yes    Leukocytosis [D72.829] 03/28/2020 03/29/2020 Yes    Pneumonia [J18.9] 03/28/2020 03/30/2020 Yes    Elevated d-dimer [R79.89] 03/28/2020 03/29/2020 Yes    Type 1 diabetes mellitus without complication [E10.9] 11/25/2019 03/29/2020 Yes       Discharged Condition: stable    Disposition: Discharged to Other Faci*    Follow Up:    Patient Instructions:   No discharge procedures on file.    Significant Diagnostic Studies:     Pending Diagnostic Studies:     None         Medications:  Transfer Medications (for Discharge Readmit only):   No current facility-administered medications for this encounter.      No current outpatient medications on file.     Facility-Administered Medications Ordered in Other Encounters   Medication Dose Route Frequency Provider Last Rate Last Dose    acetaminophen tablet 1,000 mg  1,000 mg Oral Q8H PRN Solange Barney MD        dextrose 10 % infusion  12.5 g Intravenous PRN Yashira Gannon, DO        dextrose 10 % infusion  25 g Intravenous PRN Yashira Gannon, DO        dextrose 10% (D10W) Bolus  12.5 g Intravenous PRN West Patel MD        dextrose 10% (D10W) Bolus  25 g Intravenous PRN West Patel MD        dextrose 5 % and 0.45 % NaCl with KCl 20 mEq  infusion   Intravenous Continuous Aurelio Solano,  mL/hr at 04/01/20 1102      enoxaparin injection 40 mg  40 mg Subcutaneous Daily Yashira Gannon, DO   40 mg at 03/31/20 2237    GI cocktail (mylanta 30 mL, lidocaine 2 % viscous 10 mL, dicyclomine 10 mL) 50 mL   Oral Q6H PRN Solange Barney MD   50 mL at 03/31/20 1514    glucagon (human recombinant) injection 1 mg  1 mg Intramuscular PRN Yashira Gannon, DO        glucagon (human recombinant) injection 1 mg  1 mg Intramuscular PRN West Patel MD        glucose chewable tablet 16 g  16 g Oral PRN Yashira Gannon, DO        glucose chewable tablet 16 g  16 g Oral PRN West Patel MD        glucose chewable tablet 24 g  24 g Oral PRN Yashira Gannon, DO        glucose chewable tablet 24 g  24 g Oral PRN West Patel MD        insulin aspart U-100 pen 1 Units  1 Units Subcutaneous TIDWM West Patel MD   1 Units at 04/01/20 1104    insulin regular 100 Units in sodium chloride 0.9% 100 mL infusion  1.6 Units/hr Intravenous Continuous West Patel MD 1.6 mL/hr at 04/01/20 1102 1.6 Units/hr at 04/01/20 1102    ketorolac injection 15 mg  15 mg Intravenous Q6H PRN Mohammad Wanchese, DO        levoFLOXacin tablet 750 mg  750 mg Oral Daily Solange Barney MD   750 mg at 04/01/20 0804    levothyroxine tablet 100 mcg  100 mcg Oral Daily Yashira Gannon, DO   100 mcg at 04/01/20 0804    melatonin tablet 6 mg  6 mg Oral Nightly PRN Yashira Gannon, DO        montelukast tablet 10 mg  10 mg Oral QHS Yashira Gannon, DO   10 mg at 03/31/20 2237    ondansetron injection 4 mg  4 mg Intravenous Q8H PRN Yashira Gannon, DO        pantoprazole EC tablet 40 mg  40 mg Oral Daily Mohammad Wanchese, DO   40 mg at 04/01/20 1102    potassium phosphate 20 mmol in dextrose 5 % 500 mL infusion  20 mmol Intravenous Once Elle Chau  mL/hr at 04/01/20 1103 20 mmol at 04/01/20  1103    promethazine (PHENERGAN) 6.25 mg in dextrose 5 % 50 mL IVPB  6.25 mg Intravenous Q6H PRN Yashira Gannon, DO        sodium chloride 0.9% flush 10 mL  10 mL Intravenous PRN Yashira Gannon, DO           Indwelling Lines/Drains at time of discharge:   Lines/Drains/Airways     Line                 Subcutaneous Infusion Pump Abdomen (Comment) -- days                Time spent on the discharge of patient: 35 minutes  Patient was seen and examined on the date of discharge and determined to be suitable for discharge.         Essence Saleem MD  Department of Hospital Medicine  [facility]

## 2020-04-01 NOTE — ASSESSMENT & PLAN NOTE
- DKA resolved  - Pump is being manage by Dr Osborne in [facility]  - Start transitional drip, rate at 1.6 u/hr with step down parameter  - Start weight based carb ratio 1:12 with meals  - checking glucose AC/HS/2AM  - diet advance per primary  - possible D/C tomorrow, she will need to go back on her pump at that time      Discharge plan  - Resume home pump with home settings, follow up with Dr Osborne for further management  - Resume Dexcom G6  - Stop Invokana

## 2020-04-01 NOTE — SUBJECTIVE & OBJECTIVE
"Interval HPI:   Overnight events: neg COVID, primary team is treating her for pancreatitis at this point. DKA resolving, primary team is advancing diet to full liquid  Eating:   <25%, clear at this time  Nausea: No  Hypoglycemia and intervention: No  Fever: No  TPN and/or TF: No  If yes, type of TF/TPN and rate: n/a    /68 (BP Location: Left arm, Patient Position: Lying)   Pulse 79   Temp 98.3 °F (36.8 °C) (Oral)   Resp 20   Ht 5' 5" (1.651 m)   Wt 67.2 kg (148 lb 2.4 oz)   LMP 10/17/2003   SpO2 96%   Breastfeeding? No   BMI 24.65 kg/m²     Labs Reviewed and Include    Recent Labs   Lab 04/01/20  0848   *   CALCIUM 7.9*      K 3.0*   CO2 20*   *   BUN 9   CREATININE 0.7     Lab Results   Component Value Date    WBC 15.85 (H) 03/31/2020    HGB 13.3 03/31/2020    HCT 39.8 03/31/2020    MCV 91 03/31/2020     03/31/2020     No results for input(s): TSH, FREET4 in the last 168 hours.  Lab Results   Component Value Date    HGBA1C 7.6 (H) 03/30/2020       Nutritional status:   Body mass index is 24.65 kg/m².  Lab Results   Component Value Date    ALBUMIN 3.1 (L) 03/31/2020    ALBUMIN 3.8 03/30/2020    ALBUMIN 3.4 (L) 03/29/2020     No results found for: PREALBUMIN    Estimated Creatinine Clearance: 80.8 mL/min (based on SCr of 0.7 mg/dL).    Accu-Checks  Recent Labs     03/31/20  1725 03/31/20  1728 03/31/20  1802 03/31/20  2023 03/31/20  2240 04/01/20  0106 04/01/20  0321 04/01/20  0554 04/01/20  0713 04/01/20  0935   POCTGLUCOSE 410* 360* 331* 257* 191* 206* 188* 226* 229* 201*       Current Medications and/or Treatments Impacting Glycemic Control  Immunotherapy:    Immunosuppressants     None        Steroids:   Hormones (From admission, onward)    Start     Stop Route Frequency Ordered    03/31/20 0059  melatonin tablet 6 mg      -- Oral Nightly PRN 03/31/20 0007        Pressors:    Autonomic Drugs (From admission, onward)    None        Hyperglycemia/Diabetes Medications: "   Antihyperglycemics (From admission, onward)    Start     Stop Route Frequency Ordered    04/01/20 1130  insulin regular 100 Units in sodium chloride 0.9% 100 mL infusion      -- IV Continuous 04/01/20 1022    04/01/20 1130  insulin aspart U-100 pen 1 Units      -- SubQ 3 times daily with meals 04/01/20 1022

## 2020-04-01 NOTE — ASSESSMENT & PLAN NOTE
- Unclear etiology of her DKA, could be COVID vs pump malfunction vs infection like PNA, primary team is treating gher for pancreatitis.  - At OSH, she was then taken off insulin drip prematurely (placed on MDI on 3/29) leading to another episode of DKA  - Lab work this AM, Bicarb at 20, Anion Gap: 9, pt is tolerating clears liquid  - DKA appeared to resolved  - see DM 1 section for management

## 2020-04-01 NOTE — H&P
Hospital Medicine  Progress Note  [facility]      Patient Name: Halle Spence  MRN:  [id number]  Hospital Medicine Team: [facility] 3 Hubert Zuleta DO  Date of Admission:  3/30/2020     Length of Stay:  LOS: 2 days     Principal Problem: Suspected Covid-19 Virus Infection    Chief complaint    Nausea    HPI    Ms. Halle Spence is a 54 year old female with type I DM (on insulin pump) and hypothyroidism, who was admitted to [facility] on 3/28 and transferred [place] [facility] [place] for [place].     Ms. Spence recently checked her mother out of her nursing home and brought her home after other patients at the NH were positive for COVID. Her mother has felt well and has not had any fever or cough. Ms. Spence first began to feel ill on 3/27, with nausea, vomiting, and abdominal cramping. She presented to the [facility] ED and was found to be in DKA, with glucose 545, lactic acid of 6.5, bicarb <5, and 3+ ketones in her urine. She also had leukocytosis with WBC 27k. She was treated for pneumonia with vancomycin and zosyn initially, then de-escalated to azithromycin alone. Her CTA chest revealed mild dependent atelectatic/consolidative changes in the lower lobes. She did report a cough at that time and was checked for COVID-19 which was negative. She was given Plaquenil at [place] for high suspicion of COVID-19 despite negative result.  Flu was also negative, and urinalysis was unremarkable.     At [facility], she was boarded in the ED for two days, as there were no ICU beds available, and they are unable to treat DKA on the floor. The patient was initially taken off insulin gtt on 3/29, but had to be placed back on gtt after she went back into DKA. Since arrival, Ms. Spence reports that her nausea has improved, but she still feels somewhat short of breath and unwell. She has not had any fevers.       Interval History:   The patient reports that she continues to have moderate abdominal pain with  radiation to the left upper back and LLQ. She states that she is tolerating her clear liquid diet and has had improvement in her nausea- states that she has not vomited. Would like to be able to ambulate to the restroom- she is dissatisfied with having to wear Depends. She in aware that she is being treated for PNA and pancreatitis in addition to her DKA. Endocrine following and plans to transition to a transitional gtt today with a 1:10 carb ratio for food. She is agreeable to the plan and eager for discharge when we find it appropriate. She is generally independent in her ADL's and has no limitations in activity.     Review of Systems  Constitutional: Negative for fever, chills. Positive for fatigue, poor appetite   HENT: Negative for sore throat, negative for trouble swallowing.    Eyes: Negative for photophobia, visual disturbance.   Respiratory: Negative for cough. Negative for shortness of breath  Cardiovascular: Negative for chest pain, palpitations, leg swelling.   Gastrointestinal: Negative for diarrhea. Positive for abdominal pain. No constipation. Negative for nausea, vomiting.  Endocrine: Negative for cold intolerance, heat intolerance.   Genitourinary: Negative for dysuria, frequency.   Musculoskeletal: Negative for arthralgias, myalgias.   Skin: Negative for rash  Neurological: Negative for dizziness, syncope  Psychiatric/Behavioral: Negative for confusion, hallucinations, anxiety    Physical Examination  Temp:  [97.6 °F (36.4 °C)-99.4 °F (37.4 °C)]   Pulse:  [72-86]   Resp:  [16-20]   BP: (119-133)/(59-72)   SpO2:  [96 %-100 %]     Physical Exam   Constitutional: She is oriented to person, place, and time and well-developed, well-nourished, and in no distress. No distress.   Appears stated age, mildy uncomfortable with leaning back   HENT:   Head: Normocephalic and atraumatic.   Right Ear: External ear normal.   Left Ear: External ear normal.   Nose: Nose normal.   Eyes: Pupils are equal, round, and  reactive to light. Right eye exhibits no discharge. Left eye exhibits no discharge. No scleral icterus.   Neck: No tracheal deviation present. No thyromegaly present.   Cardiovascular: Normal rate, regular rhythm, normal heart sounds and intact distal pulses.   Pulses:       Radial pulses are 2+ on the right side, and 2+ on the left side.   Pulmonary/Chest: Effort normal. No stridor. No respiratory distress. She has no wheezes. She has no rales. She exhibits no tenderness.   Abdominal: Soft. Bowel sounds are normal. She exhibits no distension. There is tenderness (epigastric and LLQ). There is no guarding.   Musculoskeletal: She exhibits no edema or tenderness.   Neurological: She is alert and oriented to person, place, and time.   Skin: Skin is warm and dry. No rash noted. She is not diaphoretic. No erythema.   Freckles noted diffusely over her upper back  No evidence of Tom Cuellar's or Prince's sign    Psychiatric: Affect and judgment normal.   Nursing note and vitals reviewed.    Laboratory:  Recent Labs   Lab 03/28/20 1758 03/30/20  1051 03/31/20  0123   WBC 27.63* 23.66* 15.85*   LYMPH 7.7*  2.1 4.0*  0.9* 8.7*  1.4   HGB 16.2* 13.7 13.3   HCT 52.5* 43.7 39.8    279 220     Recent Labs   Lab 04/01/20  0003 04/01/20  0407 04/01/20  0848    141 141   K 3.1* 3.2* 3.0*   * 114* 112*   CO2 14* 15* 20*   BUN 11 10 9   CREATININE 0.8 0.8 0.7   * 186* 175*   CALCIUM 7.8* 8.3* 7.9*   MG 2.0 2.0 2.0   PHOS 2.4* 1.8* 1.3*     Recent Labs   Lab 03/28/20  1758  03/29/20  1337 03/30/20  0735 03/31/20  0122   ALKPHOS  --    < > 96 123 104   ALT  --    < > 15 21 17   AST  --    < > 17 20 17   ALBUMIN  --    < > 3.4* 3.8 3.1*   PROT  --    < > 5.9* 7.0 5.8*   BILITOT  --    < > 0.9 0.6 0.8   INR 0.9  --   --   --   --     < > = values in this interval not displayed.      Recent Labs     03/30/20  1051 03/31/20  0123 04/01/20  0407   CRP  --   --  26.7*     --   --    LACTATE  --  1.0  --         All labs within the last 24 hours were reviewed.     Microbiology:  Lab Results   Component Value Date    [id number] Not Detected@ 03/31/2020       Microbiology Results (last 7 days)     Procedure Component Value Units Date/Time    Blood culture (site 2) [[id number]] Collected:  03/31/20 0122    Order Status:  Completed Specimen:  Blood Updated:  04/01/20 0613     Blood Culture, Routine No Growth to date      No Growth to date    Narrative:       Site # 2, aerobic only    Blood culture (site 1) [[id number]] Collected:  03/31/20 0122    Order Status:  Completed Specimen:  Blood Updated:  04/01/20 0613     Blood Culture, Routine No Growth to date      No Growth to date    Narrative:       Site # 1, aerobic and anaerobic    Influenza A & B by Molecular [[id number]] Collected:  03/31/20 0134    Order Status:  Completed Specimen:  Nasopharyngeal Swab Updated:  03/31/20 0235     Influenza A, Molecular Negative     Influenza B, Molecular Negative     Flu A & B Source NP    Culture, Respiratory with Gram Stain [[id number]]     Order Status:  No result Specimen:  Respiratory             Imaging      No results found for this or any previous visit.    X-Ray Chest AP Portable  Narrative: EXAMINATION:  XR CHEST AP PORTABLE    CLINICAL HISTORY:  PNA;    TECHNIQUE:  Single frontal view of the chest was performed.    COMPARISON:  Chest CT: 03/29/2020.    Chest radiograph: 03/28/2020.    FINDINGS:  Mediastinal structures are midline. Cardiac silhouette and pulmonary vascular distribution are normal.    Lung volumes are normal and symmetric.    Recent chest CT and chest radiograph revealed mild patchy opacities in the lower lung zones.  Similar subsegmental opacities are present in the lower lung zones on today's examination.  Pneumonia is not excluded but radiographic appearance is similar to recent studies suggesting indolent course.  I detect no new pulmonary disease.    I detect no pleural fluid, lymph node enlargement,  cardiac decompensation, pneumothorax, pneumomediastinum, pneumoperitoneum or significant osseous abnormality.  Impression: Abnormal but stable appearance of the chest radiograph with modest patchy subsegmental opacities in the lower lung zones compatible with radiographically mild pneumonia.    Electronically signed by: Puja Fox MD  Date:    03/30/2020  Time:    08:44      All imaging within the last 24 hours was reviewed.       Assessment and Plan:    Active Hospital Problems    Diagnosis  POA    *Suspected 2019 Novel Coronavirus Infection [R68.89]  Yes    Hypokalemia [E87.6]  Yes    Hypophosphatemia [E83.39]  Yes    Primary atypical pneumonia [J18.9]  Yes    Type 1 diabetes mellitus with complications [E10.8]  Yes     Chronic    Diabetic ketoacidosis without coma associated with type 1 diabetes mellitus [E10.10]  Yes    Hypothyroidism due to Hashimoto's thyroiditis [E03.8, E06.3]  Yes     Chronic      Resolved Hospital Problems    Diagnosis Date Resolved POA    DKA (diabetic ketoacidosis) [E11.10] 04/01/2020 Yes    High anion gap metabolic acidosis [E87.2] 04/01/2020 Yes       Leukocytosis  Community Acquired Pneumonia  COVID- test negative x2- empirically treated for CAP.   - ceftriaxone and azithromycin- deescalated to Levaquin   - legionella pending   - respiratory cultures pending        Diabetic Ketoacidosis  High Anion Gap Metabolic Acidosis  Type 1 Diabetes Mellitus  Home meds: Invokana, insulin pump (uses 50 units every three days)  A1c 7.6  Patient has never been in DKA before  - insulin gtt per DKA protocol- switch to transitional gtt per endocrinology   - BMP, mag, phos q4h  - endocrinology consulted for assistance with Type I diabetic on insulin pump at home    Hypothyroidism  - continue home synthroid    Pancreatitis  - Sunita score- 3- 15% predicted mortality   - Advance diet as tolerated  - Toradol prn for pain given anaphylactic reaction to codeine   - Pain control      Advance Care  Planning  Goals of care, counseling/discussion   Advance Care Planning   Full Code       Suspected Covid-19 Virus Infection  Person Under Investigation (PUI) for COVID-19  - COVID negative- test from 3/28        Hubert Zuleta DO  Internal Medicine, PGY-3

## 2020-04-01 NOTE — PROGRESS NOTES
[facility]  Endocrinology  Progress Note    Admit Date: 3/30/2020     Reason for Consult: Management of T1DM, Hyperglycemia, DKA    Diabetes diagnosis year: 1980s    Home Diabetes Medications:   Insulin pump 670g with Dexcom g6  Invokana     How often checking glucose at home? On Dexcom  BG readings on regimen: Starting Friday: High >400s  Hypoglycemia on the regimen?  No  Missed doses on regimen?  No    Diabetes Complications include:     Hyperglycemia    Complicating diabetes co morbidities:   n/a      HPI:   This is a 54 year old female with type I DM (on insulin pump) and hypothyroidism, who was admitted to [facility] on 3/28 and transferred to [facility] [place] 3[place]30 for DKA, glucose was 545, lactic acid of 6.5, bicarb <5, and 3+ ketones in her urine. She also had leukocytosis with WBC 27k. She was treated for pneumonia, COVID-19 which was negative but treated as if COVID-19 positive. Pt report since Friday, she has had GI issue with N/V/D with short of breath and feeling of unwell. She has not had any fevers. She was transfer as there were no ICU beds in [place]. Dr Osborne manages her diabetes, reported last A1C was 7.5 range,    Pt denies any recent pump issues. Insulin pump was evaluated by me outside of her room but due to missing a reservoir (pt does not have it on her), we were unable to access her pump settings due to this. Pt report basal rate of 1.25 u/hr. She report 1:10 carb ratio.     Info was provided by patient via Telephone Call, to minimized contact due to COVID suspicion     Interval HPI:   Overnight events: neg COVID, primary team is treating her for pancreatitis at this point. DKA resolving, primary team is advancing diet to full liquid  Eating:   <25%, clear at this time  Nausea: No  Hypoglycemia and intervention: No  Fever: No  TPN and/or TF: No  If yes, type of TF/TPN and rate: n/a    /68 (BP Location: Left arm, Patient Position: Lying)   Pulse 79   Temp 98.3 °F (36.8 °C)  "(Oral)   Resp 20   Ht 5' 5" (1.651 m)   Wt 67.2 kg (148 lb 2.4 oz)   LMP 10/17/2003   SpO2 96%   Breastfeeding? No   BMI 24.65 kg/m²      Labs Reviewed and Include    Recent Labs   Lab 04/01/20  0848   *   CALCIUM 7.9*      K 3.0*   CO2 20*   *   BUN 9   CREATININE 0.7     Lab Results   Component Value Date    WBC 15.85 (H) 03/31/2020    HGB 13.3 03/31/2020    HCT 39.8 03/31/2020    MCV 91 03/31/2020     03/31/2020     No results for input(s): TSH, FREET4 in the last 168 hours.  Lab Results   Component Value Date    HGBA1C 7.6 (H) 03/30/2020       Nutritional status:   Body mass index is 24.65 kg/m².  Lab Results   Component Value Date    ALBUMIN 3.1 (L) 03/31/2020    ALBUMIN 3.8 03/30/2020    ALBUMIN 3.4 (L) 03/29/2020     No results found for: PREALBUMIN    Estimated Creatinine Clearance: 80.8 mL/min (based on SCr of 0.7 mg/dL).    Accu-Checks  Recent Labs     03/31/20  1725 03/31/20  1728 03/31/20  1802 03/31/20  2023 03/31/20  2240 04/01/20  0106 04/01/20  0321 04/01/20  0554 04/01/20  0713 04/01/20  0935   POCTGLUCOSE 410* 360* 331* 257* 191* 206* 188* 226* 229* 201*       Current Medications and/or Treatments Impacting Glycemic Control  Immunotherapy:    Immunosuppressants     None        Steroids:   Hormones (From admission, onward)    Start     Stop Route Frequency Ordered    03/31/20 0059  melatonin tablet 6 mg      -- Oral Nightly PRN 03/31/20 0007        Pressors:    Autonomic Drugs (From admission, onward)    None        Hyperglycemia/Diabetes Medications:   Antihyperglycemics (From admission, onward)    Start     Stop Route Frequency Ordered    04/01/20 1130  insulin regular 100 Units in sodium chloride 0.9% 100 mL infusion      -- IV Continuous 04/01/20 1022    04/01/20 1130  insulin aspart U-100 pen 1 Units      -- SubQ 3 times daily with meals 04/01/20 1022          ASSESSMENT and PLAN    * Suspected 2019 Novel Coronavirus Infection  - has been shown to lead to DKA "   - management per primary team      Hypophosphatemia  - recommend replacement      Primary atypical pneumonia  - on ABx      Type 1 diabetes mellitus with complications  - DKA resolved  - Pump is being manage by Dr Osborne in [facility]  - Start transitional drip, rate at 1.6 u/hr with step down parameter  - Start weight based carb ratio 1:12 with meals  - checking glucose AC/HS/2AM  - diet advance per primary  - possible D/C tomorrow, she will need to go back on her pump at that time      Discharge plan  - Resume home pump with home settings, follow up with Dr Osborne for further management  - Resume Dexcom G6  - Stop Invokana     Diabetic ketoacidosis without coma associated with type 1 diabetes mellitus  - Unclear etiology of her DKA, could be COVID vs pump malfunction vs infection like PNA, primary team is treating gher for pancreatitis.  - At OSH, she was then taken off insulin drip prematurely (placed on MDI on 3/29) leading to another episode of DKA  - Lab work this AM, Bicarb at 20, Anion Gap: 9, pt is tolerating clears liquid  - DKA appeared to resolved  - see DM 1 section for management      Hypothyroidism due to Hashimoto's thyroiditis  - continue home dose, LT4 100mcg daily  - Last TSH was 4 mo and was WNL    Pancreatitis  - management per primary        Wset Patel MD  Endocrinology  [facility] D[facility]az MD,  have personally taken the history and examined the patient and agree with the resident's note as stated above.

## 2020-04-02 VITALS
TEMPERATURE: 99 F | DIASTOLIC BLOOD PRESSURE: 68 MMHG | BODY MASS INDEX: 24.68 KG/M2 | HEART RATE: 93 BPM | SYSTOLIC BLOOD PRESSURE: 109 MMHG | RESPIRATION RATE: 18 BRPM | OXYGEN SATURATION: 97 % | WEIGHT: 148.13 LBS | HEIGHT: 65 IN

## 2020-04-02 LAB
ANION GAP SERPL CALC-SCNC: 11 MMOL/L (ref 8–16)
BACTERIA BLD CULT: NORMAL
BACTERIA BLD CULT: NORMAL
BASOPHILS # BLD AUTO: 0.03 K/UL (ref 0–0.2)
BASOPHILS NFR BLD: 0.5 % (ref 0–1.9)
BUN SERPL-MCNC: 7 MG/DL (ref 6–20)
CALCIUM SERPL-MCNC: 8.5 MG/DL (ref 8.7–10.5)
CHLORIDE SERPL-SCNC: 105 MMOL/L (ref 95–110)
CO2 SERPL-SCNC: 23 MMOL/L (ref 23–29)
CREAT SERPL-MCNC: 0.7 MG/DL (ref 0.5–1.4)
DIFFERENTIAL METHOD: ABNORMAL
EOSINOPHIL # BLD AUTO: 0.1 K/UL (ref 0–0.5)
EOSINOPHIL NFR BLD: 0.9 % (ref 0–8)
ERYTHROCYTE [DISTWIDTH] IN BLOOD BY AUTOMATED COUNT: 12.9 % (ref 11.5–14.5)
EST. GFR  (AFRICAN AMERICAN): >60 ML/MIN/1.73 M^2
EST. GFR  (NON AFRICAN AMERICAN): >60 ML/MIN/1.73 M^2
GLUCOSE SERPL-MCNC: 171 MG/DL (ref 70–110)
HCT VFR BLD AUTO: 36.3 % (ref 37–48.5)
HGB BLD-MCNC: 12.5 G/DL (ref 12–16)
IMM GRANULOCYTES # BLD AUTO: 0.02 K/UL (ref 0–0.04)
IMM GRANULOCYTES NFR BLD AUTO: 0.4 % (ref 0–0.5)
L PNEUMO AG UR QL IA: NOT DETECTED
LYMPHOCYTES # BLD AUTO: 2.2 K/UL (ref 1–4.8)
LYMPHOCYTES NFR BLD: 38.2 % (ref 18–48)
MCH RBC QN AUTO: 30.2 PG (ref 27–31)
MCHC RBC AUTO-ENTMCNC: 34.4 G/DL (ref 32–36)
MCV RBC AUTO: 88 FL (ref 82–98)
MONOCYTES # BLD AUTO: 0.5 K/UL (ref 0.3–1)
MONOCYTES NFR BLD: 9.4 % (ref 4–15)
NEUTROPHILS # BLD AUTO: 2.9 K/UL (ref 1.8–7.7)
NEUTROPHILS NFR BLD: 50.6 % (ref 38–73)
NRBC BLD-RTO: 0 /100 WBC
PHOSPHATE SERPL-MCNC: 2.3 MG/DL (ref 2.7–4.5)
PLATELET # BLD AUTO: 174 K/UL (ref 150–350)
PMV BLD AUTO: 9.6 FL (ref 9.2–12.9)
POCT GLUCOSE: 151 MG/DL (ref 70–110)
POCT GLUCOSE: 166 MG/DL (ref 70–110)
POCT GLUCOSE: 188 MG/DL (ref 70–110)
POCT GLUCOSE: 224 MG/DL (ref 70–110)
POTASSIUM SERPL-SCNC: 3.4 MMOL/L (ref 3.5–5.1)
RBC # BLD AUTO: 4.14 M/UL (ref 4–5.4)
SODIUM SERPL-SCNC: 139 MMOL/L (ref 136–145)
WBC # BLD AUTO: 5.65 K/UL (ref 3.9–12.7)

## 2020-04-02 PROCEDURE — 99238 PR HOSPITAL DISCHARGE DAY,<30 MIN: ICD-10-PCS | Mod: ,,, | Performed by: INTERNAL MEDICINE

## 2020-04-02 PROCEDURE — 84100 ASSAY OF PHOSPHORUS: CPT

## 2020-04-02 PROCEDURE — 25000003 PHARM REV CODE 250: Performed by: STUDENT IN AN ORGANIZED HEALTH CARE EDUCATION/TRAINING PROGRAM

## 2020-04-02 PROCEDURE — 80048 BASIC METABOLIC PNL TOTAL CA: CPT

## 2020-04-02 PROCEDURE — 99232 PR SUBSEQUENT HOSPITAL CARE,LEVL II: ICD-10-PCS | Mod: ,,, | Performed by: INTERNAL MEDICINE

## 2020-04-02 PROCEDURE — 63600175 PHARM REV CODE 636 W HCPCS: Performed by: HOSPITALIST

## 2020-04-02 PROCEDURE — C9399 UNCLASSIFIED DRUGS OR BIOLOG: HCPCS | Performed by: HOSPITALIST

## 2020-04-02 PROCEDURE — 99238 HOSP IP/OBS DSCHRG MGMT 30/<: CPT | Mod: ,,, | Performed by: INTERNAL MEDICINE

## 2020-04-02 PROCEDURE — 63600175 PHARM REV CODE 636 W HCPCS: Performed by: INTERNAL MEDICINE

## 2020-04-02 PROCEDURE — 99232 SBSQ HOSP IP/OBS MODERATE 35: CPT | Mod: ,,, | Performed by: INTERNAL MEDICINE

## 2020-04-02 PROCEDURE — 63600175 PHARM REV CODE 636 W HCPCS: Performed by: STUDENT IN AN ORGANIZED HEALTH CARE EDUCATION/TRAINING PROGRAM

## 2020-04-02 PROCEDURE — 36415 COLL VENOUS BLD VENIPUNCTURE: CPT

## 2020-04-02 PROCEDURE — 85025 COMPLETE CBC W/AUTO DIFF WBC: CPT

## 2020-04-02 RX ORDER — KETOROLAC TROMETHAMINE 10 MG/1
10 TABLET, FILM COATED ORAL EVERY 6 HOURS
Qty: 8 TABLET | Refills: 0 | Status: SHIPPED | OUTPATIENT
Start: 2020-04-02 | End: 2020-04-04

## 2020-04-02 RX ADMIN — LEVOFLOXACIN 750 MG: 750 TABLET, FILM COATED ORAL at 08:04

## 2020-04-02 RX ADMIN — INSULIN ASPART 4 UNITS: 100 INJECTION, SOLUTION INTRAVENOUS; SUBCUTANEOUS at 12:04

## 2020-04-02 RX ADMIN — INSULIN ASPART 4 UNITS: 100 INJECTION, SOLUTION INTRAVENOUS; SUBCUTANEOUS at 08:04

## 2020-04-02 RX ADMIN — ONDANSETRON 4 MG: 2 INJECTION, SOLUTION INTRAMUSCULAR; INTRAVENOUS at 09:04

## 2020-04-02 RX ADMIN — PANTOPRAZOLE SODIUM 40 MG: 40 TABLET, DELAYED RELEASE ORAL at 08:04

## 2020-04-02 RX ADMIN — INSULIN DETEMIR 10 UNITS: 100 INJECTION, SOLUTION SUBCUTANEOUS at 09:04

## 2020-04-02 RX ADMIN — LEVOTHYROXINE SODIUM 100 MCG: 100 TABLET ORAL at 08:04

## 2020-04-02 NOTE — DISCHARGE SUMMARY
[facility] Hospital Medicine  Discharge Summary      Patient Name: Halle Spence  MRN: [id number]  Admission Date: 3/30/2020  Hospital Length of Stay: 3 days  Discharge Date and Time:  04/02/2020 1:23 PM  Attending Physician: Jeff Ferrell MD   Discharging Provider: Hubert Zuleta DO  Primary Care Provider: Vilam Milan MD  Hospital Medicine Team: [facility] HOSP MED 3 Hubert Zuleta DO    HPI:   Ms. Halle Spence is a 54 year old female with type I DM (on insulin pump) and hypothyroidism, who was admitted to [facility] on 3/28 and transferred to [facility] on 3/30 for DKA.      Ms. Spence recently checked her mother out of her nursing home and brought her home after other patients at the NH were positive for COVID. Her mother has felt well and has not had any fever or cough. Ms. Spence first began to feel ill on 3/27, with nausea, vomiting, and abdominal cramping. She presented to the [facility] ED and was found to be in DKA, with glucose 545, lactic acid of 6.5, bicarb <5, and 3+ ketones in her urine. She also had leukocytosis with WBC 27k. She was treated for pneumonia with vancomycin and zosyn initially, then de-escalated to azithromycin alone. Her CTA chest revealed mild dependent atelectatic/consolidative changes in the lower lobes. She did report a cough at that time and was checked for COVID-19 which was negative. She was given Plaquenil at [place] for high suspicion of COVID-19 despite negative result.  Flu was also negative, and urinalysis was unremarkable.      At [facility], she was boarded in the ED for two days, as there were no ICU beds available, and they are unable to treat DKA on the floor. The patient was initially taken off insulin gtt on 3/29, but had to be placed back on gtt after she went back into DKA. Since arrival, Ms. Spence reports that her nausea has improved, but she still feels somewhat short of breath and unwell. She has not had any fevers.      * No surgery found  *      Hospital Course:   The patient was admitted to [facility] medicine on 03/30/2020 as a covid rule out. She tested negative x2. She was determined to have CAP, DKA and pancreatitis. CAP treated with CTX + azithromycin and eventually de-escalated to levaquin- therapy completed while inpatient. Endocrinology was consulted for diabetes management given the patient generally uses an infusion pump at home. She was placed on an insulin gtt and transitioned to a transition gtt. Per endocrinology- continue pump at home immediately upon discharge and discontinue Invokana. Follow up with Dr. Osborne to be coordinated ambulatory referral to endocrinology placed. She was treated with fluids and bowel rest initially for her pancreatitis- unclear what precipitated this- lipase >300 with epigastric abdominal pain- though lab work did not demonstrate elevated transaminases or alkaline phosphatase. Patient advanced diet and on discharge on 4/2- tolerating oral foods with minimal pain. Ambulatory referral to internal medicine placed- initial episode of pancreatitis may warrant outpatient workup.      Review of Systems  Constitutional: Negative for fever, chills. Positive for fatigue  HENT: Negative for sore throat, negative for trouble swallowing.    Eyes: Negative for photophobia, visual disturbance.   Respiratory: Negative for cough. Negative for shortness of breath  Cardiovascular: Negative for chest pain, palpitations, leg swelling.   Gastrointestinal: Negative for diarrhea. Positive for abdominal pain (mild). No constipation. Negative for nausea, vomiting.  Endocrine: Negative for cold intolerance, heat intolerance.   Genitourinary: Negative for dysuria, frequency.   Musculoskeletal: Negative for arthralgias, myalgias.   Skin: Negative for rash  Neurological: Negative for dizziness, syncope  Psychiatric/Behavioral: Negative for confusion, hallucinations, anxiety     Physical Examination  Vitals:    04/02/20 0535 04/02/20 0537  04/02/20 0841 04/02/20 1225   BP:   106/60 109/68   BP Location:   Right arm Left arm   Patient Position:   Lying Sitting   Pulse: 75 78 73 93   Resp: 20 20 16 18   Temp: 98.7 °F (37.1 °C) 98.7 °F (37.1 °C) 96.3 °F (35.7 °C) 98.6 °F (37 °C)   TempSrc: Oral Oral Oral Oral   SpO2: 96% 95% 98% 97%   Weight:       Height:            Physical Exam   Constitutional: She is oriented to person, place, and time and well-developed, well-nourished, and in no distress. No distress.   Appears stated age  HENT:   Head: Normocephalic and atraumatic.   Right Ear: External ear normal.   Left Ear: External ear normal.   Nose: Nose normal.   Eyes: Pupils are equal, round, and reactive to light. Right eye exhibits no discharge. Left eye exhibits no discharge. No scleral icterus.   Neck: No tracheal deviation present. No thyromegaly present.   Cardiovascular: Normal rate, regular rhythm, normal heart sounds and intact distal pulses.   Pulses:       Radial pulses are 2+ on the right side, and 2+ on the left side.   Pulmonary/Chest: Effort normal. No stridor. No respiratory distress. She has no wheezes. She has no rales. She exhibits no tenderness.   Abdominal: Soft. Bowel sounds are normal. She exhibits no distension. There is tenderness (epigastric). There is no guarding.   Musculoskeletal: She exhibits no edema or tenderness.   Neurological: She is alert and oriented to person, place, and time.   Skin: Skin is warm and dry. No rash noted. She is not diaphoretic. No erythema.   Freckles noted diffusely over her upper back  No evidence of Tom Cuellar's or Prince's sign  Port for insulin pump present in LLQ- CDI without surrounding erythema or drainage     Psychiatric: Affect and judgment normal.   Nursing note and vitals reviewed.    Consults:   Consults (From admission, onward)        Status Ordering Provider     Inpatient consult to Endocrinology  Once     Provider:  (Not yet assigned)    Completed BOLIVAR VILLEDA     Inpatient  consult to Infection Control (Nurse)  Once     Provider:  (Not yet assigned)    Acknowledged BOLIVAR VILLEDA          No new Assessment & Plan notes have been filed under this hospital service since the last note was generated.  Service: Hospital Medicine    Final Active Diagnoses:    Diagnosis Date Noted POA    PRINCIPAL PROBLEM:  Diabetic ketoacidosis without coma associated with type 1 diabetes mellitus [E10.10] 03/28/2020 Yes    Hypokalemia [E87.6] 03/31/2020 Yes    Hypophosphatemia [E83.39] 03/31/2020 Yes    Primary atypical pneumonia [J18.9]  Yes    Type 1 diabetes mellitus with complications [E10.8]  Yes     Chronic    Suspected 2019 Novel Coronavirus Infection [R68.89] 03/28/2020 Yes    Hypothyroidism due to Hashimoto's thyroiditis [E03.8, E06.3] 11/25/2019 Yes     Chronic      Problems Resolved During this Admission:    Diagnosis Date Noted Date Resolved POA    DKA (diabetic ketoacidosis) [E11.10] 03/30/2020 04/01/2020 Yes    High anion gap metabolic acidosis [E87.2] 03/28/2020 04/01/2020 Yes       Discharged Condition: stable    Disposition: Home or Self Care    Follow Up:    Patient Instructions:      Ambulatory referral/consult to Endocrinology   Standing Status: Future   Referral Priority: Routine Referral Type: Consultation   Referred to Provider: ERIN MARTINS Requested Specialty: Endocrinology   Number of Visits Requested: 1     Ambulatory referral/consult to Internal Medicine   Standing Status: Future   Referral Priority: Routine Referral Type: Consultation   Referral Reason: Specialty Services Required   Referred to Provider: ANTONIA GRIFFIN Requested Specialty: Internal Medicine   Number of Visits Requested: 1       Significant Diagnostic Studies: Labs: All labs within the past 24 hours have been reviewed    Pending Diagnostic Studies:     None         Medications:  Reconciled Home Medications:      Medication List      START taking these medications    ketorolac 10 mg  tablet  Commonly known as:  TORADOL  Take 1 tablet (10 mg total) by mouth every 6 (six) hours. for 2 days        CONTINUE taking these medications    HumaLOG U-100 Insulin 100 unit/mL injection  Generic drug:  insulin lispro  USE WITH INSULIN PUMP MAX  UNITS PER DAY     levothyroxine 88 MCG tablet  Commonly known as:  SYNTHROID  Take 88 mcg by mouth once daily.     montelukast 10 mg tablet  Commonly known as:  SINGULAIR  Take 10 mg by mouth every evening.        STOP taking these medications    INVOKANA 100 mg Tab tablet  Generic drug:  canagliflozin            Indwelling Lines/Drains at time of discharge:   Lines/Drains/Airways     Line                 Subcutaneous Infusion Pump Abdomen (Comment) -- days                Time spent on the discharge of patient: 35 minutes  Patient was seen and examined on the date of discharge and determined to be suitable for discharge.         Hubert Zuleta DO  Department of Hospital Medicine  [facility]

## 2020-04-02 NOTE — SUBJECTIVE & OBJECTIVE
"Interval HPI:   Overnight events: doing well, tolerating diet, plan to be DC this afternoon  Eatin%  Nausea: No  Hypoglycemia and intervention: No  Fever: No  TPN and/or TF: No  If yes, type of TF/TPN and rate: n/a    /60 (BP Location: Right arm, Patient Position: Lying)   Pulse 73   Temp 96.3 °F (35.7 °C) (Oral)   Resp 16   Ht 5' 5" (1.651 m)   Wt 67.2 kg (148 lb 2.4 oz)   LMP 10/17/2003   SpO2 98%   Breastfeeding? No   BMI 24.65 kg/m²     Labs Reviewed and Include    Recent Labs   Lab 20  0914   *   CALCIUM 8.5*      K 3.4*   CO2 23      BUN 7   CREATININE 0.7     Lab Results   Component Value Date    WBC 5.65 2020    HGB 12.5 2020    HCT 36.3 (L) 2020    MCV 88 2020     2020     No results for input(s): TSH, FREET4 in the last 168 hours.  Lab Results   Component Value Date    HGBA1C 7.6 (H) 2020       Nutritional status:   Body mass index is 24.65 kg/m².  Lab Results   Component Value Date    ALBUMIN 3.1 (L) 2020    ALBUMIN 3.8 2020    ALBUMIN 3.4 (L) 2020     No results found for: PREALBUMIN    Estimated Creatinine Clearance: 80.8 mL/min (based on SCr of 0.7 mg/dL).    Accu-Checks  Recent Labs     20  2240 20  0106 20  0321 20  0554 20  0713 20  0935 20  1644 20  2242 20  0239 20  0833   POCTGLUCOSE 191* 206* 188* 226* 229* 201* 286* 402* 166* 151*       Current Medications and/or Treatments Impacting Glycemic Control  Immunotherapy:    Immunosuppressants     None        Steroids:   Hormones (From admission, onward)    Start     Stop Route Frequency Ordered    20 0059  melatonin tablet 6 mg      -- Oral Nightly PRN 20 0007        Pressors:    Autonomic Drugs (From admission, onward)    None        Hyperglycemia/Diabetes Medications:   Antihyperglycemics (From admission, onward)    Start     Stop Route Frequency Ordered    20 0715 "  insulin aspart U-100 pen 4 Units      -- SubQ 3 times daily with meals 04/01/20 2152 04/02/20 0019  insulin aspart U-100 pen 1-10 Units      -- SubQ Before meals & nightly PRN 04/01/20 2319    04/01/20 1130  insulin regular 100 Units in sodium chloride 0.9% 100 mL infusion      -- IV Continuous 04/01/20 1022

## 2020-04-02 NOTE — ASSESSMENT & PLAN NOTE
- DKA resolved  - Pump is being manage by Dr Osborne in [facility] [address] [facility]  - Hyperglycemia due to not using the carb ratio, miss understand with nursing, also D5 gtt was still running  - glucose is better this AM, 150-170    Recommendation  - Pt to be D/C home today, all pump supplies are at home, will give her 1x dose of Levemir 10U (1/2 her basal rate), stop transitional drip within 2 hr of levemir adminstration  - continue Novolog 4U TIDWM if still inpatient, (0.4 u/kg weight based)  - advised her to place back her pump immediately when she gets home  - POCT AC/HS/2AM      Discharge plan  - Resume home pump with home settings, follow up with Dr Osborne for further management  - Resume Dexcom G6  - Stop Invokana

## 2020-04-02 NOTE — PLAN OF CARE
POC reviewed, pt verbalized understanding. VSS on RA. 0/10 pain expressed. Insulin drip running per MAR. Voids per BSC, adequate UOP overnight. Pt on ADA diabetic diet w/ no complaints of N/V. Pt slept between care. Frequent rounds made for safety, call light in reach. WCTM

## 2020-04-02 NOTE — PROGRESS NOTES
"[facility]  Endocrinology  Progress Note    Admit Date: 3/30/2020     Reason for Consult: Management of T1DM, Hyperglycemia, DKA    Diabetes diagnosis year:     Home Diabetes Medications:   Insulin pump 670g with Dexcom g6  Invokana     How often checking glucose at home? On Dexcom  BG readings on regimen: Starting Friday: High >400s  Hypoglycemia on the regimen?  No  Missed doses on regimen?  No    Diabetes Complications include:     Hyperglycemia    Complicating diabetes co morbidities:   n/a      HPI:   This is a 54 year old female with type I DM (on insulin pump) and hypothyroidism, who was admitted to [facility] on 3/28 and transferred to [facility] on 3/30 for DKA, glucose was 545, lactic acid of 6.5, bicarb <5, and 3+ ketones in her urine. She also had leukocytosis with WBC 27k. She was treated for pneumonia, COVID-19 which was negative but treated as if COVID-19 positive. Pt report since Friday, she has had GI issue with N/V/D with short of breath and feeling of unwell. She has not had any fevers. She was transfer as there were no ICU beds in [place]. Dr Osborne manages her diabetes, reported last A1C was 7.5 range,    Pt denies any recent pump issues. Insulin pump was evaluated by me outside of her room but due to missing a reservoir (pt does not have it on her), we were unable to access her pump settings due to this. Pt report basal rate of 1.25 u/hr. She report 1:10 carb ratio.     Info was provided by patient via Telephone Call, to minimized contact due to COVID suspicion     Interval HPI:   Overnight events: doing well, tolerating diet, plan to be DC this afternoon  Eatin%  Nausea: No  Hypoglycemia and intervention: No  Fever: No  TPN and/or TF: No  If yes, type of TF/TPN and rate: n/a    /60 (BP Location: Right arm, Patient Position: Lying)   Pulse 73   Temp 96.3 °F (35.7 °C) (Oral)   Resp 16   Ht 5' 5" (1.651 m)   Wt 67.2 kg (148 lb 2.4 oz)   LMP 10/17/2003   SpO2 " 98%   Breastfeeding? No   BMI 24.65 kg/m²      Labs Reviewed and Include    Recent Labs   Lab 04/02/20  0914   *   CALCIUM 8.5*      K 3.4*   CO2 23      BUN 7   CREATININE 0.7     Lab Results   Component Value Date    WBC 5.65 04/02/2020    HGB 12.5 04/02/2020    HCT 36.3 (L) 04/02/2020    MCV 88 04/02/2020     04/02/2020     No results for input(s): TSH, FREET4 in the last 168 hours.  Lab Results   Component Value Date    HGBA1C 7.6 (H) 03/30/2020       Nutritional status:   Body mass index is 24.65 kg/m².  Lab Results   Component Value Date    ALBUMIN 3.1 (L) 03/31/2020    ALBUMIN 3.8 03/30/2020    ALBUMIN 3.4 (L) 03/29/2020     No results found for: PREALBUMIN    Estimated Creatinine Clearance: 80.8 mL/min (based on SCr of 0.7 mg/dL).    Accu-Checks  Recent Labs     03/31/20  2240 04/01/20  0106 04/01/20  0321 04/01/20  0554 04/01/20  0713 04/01/20  0935 04/01/20  1644 04/01/20  2242 04/02/20  0239 04/02/20  0833   POCTGLUCOSE 191* 206* 188* 226* 229* 201* 286* 402* 166* 151*       Current Medications and/or Treatments Impacting Glycemic Control  Immunotherapy:    Immunosuppressants     None        Steroids:   Hormones (From admission, onward)    Start     Stop Route Frequency Ordered    03/31/20 0059  melatonin tablet 6 mg      -- Oral Nightly PRN 03/31/20 0007        Pressors:    Autonomic Drugs (From admission, onward)    None        Hyperglycemia/Diabetes Medications:   Antihyperglycemics (From admission, onward)    Start     Stop Route Frequency Ordered    04/02/20 0715  insulin aspart U-100 pen 4 Units      -- SubQ 3 times daily with meals 04/01/20 2152    04/02/20 0019  insulin aspart U-100 pen 1-10 Units      -- SubQ Before meals & nightly PRN 04/01/20 2319    04/01/20 1130  insulin regular 100 Units in sodium chloride 0.9% 100 mL infusion      -- IV Continuous 04/01/20 1022          ASSESSMENT and PLAN    * Diabetic ketoacidosis without coma associated with type 1 diabetes  mellitus  - Unclear etiology of her DKA, could be COVID vs pump malfunction vs infection like PNA, primary team is treating gher for pancreatitis.  - At OSH, she was then taken off insulin drip prematurely (placed on MDI on 3/29) leading to another episode of DKA  - DKA appeared to resolved  - see DM 1 section for management      Hypophosphatemia  - recommend replacement      Primary atypical pneumonia  - on ABx      Type 1 diabetes mellitus with complications  - DKA resolved  - Pump is being manage by Dr Osborne in town  - Hyperglycemia due to not using the carb ratio, miss understand with nursing, also D5 gtt was still running  - glucose is better this AM, 150-170    Recommendation  - Pt to be D/C home today, all pump supplies are at home, will give her 1x dose of Levemir 10U (1/2 her basal rate), stop transitional drip within 2 hr of levemir adminstration  - continue Novolog 4U TIDWM if still inpatient, (0.4 u/kg weight based)  - advised her to place back her pump immediately when she gets home  - POCT AC/HS/2AM      Discharge plan  - Resume home pump with home settings, follow up with Dr Osborne for further management  - Resume Dexcom G6  - Stop Invokana     Suspected 2019 Novel Coronavirus Infection  - has been shown to lead to DKA   - negative test      Hypothyroidism due to Hashimoto's thyroiditis  - continue home dose, LT4 100mcg daily  - Last TSH was 4 mo and was WNL          West Patel MD  Endocrinology  [facility] D[facility] MD,  have personally taken the history and examined the patient and agree with the resident's note as stated above.

## 2020-04-02 NOTE — HPI
Ms. Halle Spence is a 54 year old female with type I DM (on insulin pump) and hypothyroidism, who was admitted to [facility] on 3/28 and transferred to [facility] on 3/30 for DKA.      Ms. Spence recently checked her mother out of her nursing home and brought her home after other patients at the NH were positive for COVID. Her mother has felt well and has not had any fever or cough. Ms. Spence first began to feel ill on 3/27, with nausea, vomiting, and abdominal cramping. She presented to the [facility] ED and was found to be in DKA, with glucose 545, lactic acid of 6.5, bicarb <5, and 3+ ketones in her urine. She also had leukocytosis with WBC 27k. She was treated for pneumonia with vancomycin and zosyn initially, then de-escalated to azithromycin alone. Her CTA chest revealed mild dependent atelectatic/consolidative changes in the lower lobes. She did report a cough at that time and was checked for COVID-19 which was negative. She was given Plaquenil at [place] for high suspicion of COVID-19 despite negative result.  Flu was also negative, and urinalysis was unremarkable.      At [facility], she was boarded in the ED for two days, as there were no ICU beds available, and they are unable to treat DKA on the floor. The patient was initially taken off insulin gtt on 3/29, but had to be placed back on gtt after she went back into DKA. Since arrival, Ms. Spence reports that her nausea has improved, but she still feels somewhat short of breath and unwell. She has not had any fevers.

## 2020-04-02 NOTE — PHYSICIAN QUERY
"PT Name: Halle Spence  MR #: [id number]    Physician Query Form - Pneumonia Clarification     CDS/: Colleen Maurer               Contact information:[email or web address]  This form is a permanent document in the medical record.    Query Date:  April 2, 2020    By submitting this query, we are merely seeking further clarification of documentation. Please utilize your independent clinical judgment when addressing the question(s) below.    The Medical record contains the following:   Indicators   Supporting Clinical Findings Location in Medical Record   x "Pneumonia" documented Empiric ABX for bacterial PNA   Discharge summary   x Chest X-Ray: Chest x-ray concerning for pneumonia Discharge summary    PaO2    PaCO2     O2 sat      Cultures      x Treatment  Continue Vanc/Zosyn, Azithromycin for bacterial PNA coverage      HM note 3-30    Supplemental O2      Other         Provider, please specify type of pneumonia.    Please further specify the known or suspected Pneumonia organism.    [ x  ] Bacterial Pneumonia (Specify organism): no organism isolated    [   ] Bacterial, Gram Negative organism Pneumonia   [   ] Other type of pneumonia (please specify):   [  ] Clinically undetermined         Please document in your progress notes daily for the duration of treatment, until resolved, and include in your discharge summary.    .                                                                                    "

## 2020-04-02 NOTE — HOSPITAL COURSE
The patient was admitted to hospital medicine on 03/30/2020 as a covid rule out. She tested negative x2. She was determined to have CAP, DKA and pancreatitis. CAP treated with CTX + azithromycin and eventually de-escalated to levaquin- therapy completed while inpatient. Endocrinology was consulted for diabetes management given the patient generally uses an infusion pump at home. She was placed on an insulin gtt and transitioned to a transition gtt. Per endocrinology- continue pump at home immediately upon discharge and discontinue Invokana. Follow up with Dr. Osborne to be coordinated ambulatory referral to endocrinology placed. She was treated with fluids and bowel rest initially for her pancreatitis- unclear what precipitated this- lipase >300 with epigastric abdominal pain- though lab work did not demonstrate elevated transaminases or alkaline phosphatase. Patient advanced diet and on discharge on 4/2- tolerating oral foods with minimal pain. Ambulatory referral to internal medicine placed- initial episode of pancreatitis may warrant outpatient workup.

## 2020-04-02 NOTE — ASSESSMENT & PLAN NOTE
- Unclear etiology of her DKA, could be COVID vs pump malfunction vs infection like PNA, primary team is treating gher for pancreatitis.  - At OSH, she was then taken off insulin drip prematurely (placed on MDI on 3/29) leading to another episode of DKA  - DKA appeared to resolved  - see DM 1 section for management

## 2020-04-03 NOTE — PLAN OF CARE
Patient discharged home 4/2/2020 with no needs.       04/03/20 0746   Final Note   Assessment Type Final Discharge Note   Anticipated Discharge Disposition Home   Right Care Referral Info   Post Acute Recommendation No Care   Post-Acute Status   Other Status No Post-Acute Service Needs      Robert Murguia(Resident)

## 2020-04-04 LAB
BACTERIA BLD CULT: NORMAL
BACTERIA BLD CULT: NORMAL

## 2020-04-06 ENCOUNTER — PATIENT OUTREACH (OUTPATIENT)
Dept: ADMINISTRATIVE | Facility: CLINIC | Age: 55
End: 2020-04-06

## 2020-04-06 NOTE — PROGRESS NOTES
C3 nurse attempted to contact patient. No answer. The following message was left for the patient to return the call:  Good afternoon I am a nurse calling on behalf of [facility] from the Care Coordination Center.  This is a Transitional Care Call for Halle. When you have a moment please contact us at [phone] or [phone] Monday through Friday, between the hours of 8 am to 4 pm. We look forward to speaking with you. On behalf of [facility] have a nice day.    The patient does not have a scheduled HOSFU appointment within 7-14 days post hospital discharge date 4/2/20. Message sent to Physician staff to assist with HOSFU appointment scheduling.        
No

## 2020-04-08 NOTE — PATIENT INSTRUCTIONS
Diabetic Ketoacidosis  Diabetic ketoacidosis (DKA) is a serious problem that can happen in people with diabetes. DKA should be treated as a medical emergency. This is because it can lead to coma or death. If you have the symptoms of DKA, get medical help right away.  DKA happens more often in people with type 1 diabetes. But it can happen in people with type 2 diabetes. It can also happen in women with diabetes during pregnancy. This is often known as gestational diabetes.  DKA happens when insulin levels are too low. Without enough insulin, sugar (glucose) cant get to the cells of your body. The glucose stays in the blood. This causes high blood glucose (hyperglycemia). Without glucose, your body breaks down stored fat for energy. When this happens, acids called ketones are released into the blood. This is known as ketosis. High levels of ketones (ketoacidosis) can be harmful to you. Hyperglycemia and ketoacidosis can also cause serious problems in the blood and your body, such as:  · Low levels of potassium (hypokalemia)  · Swelling inside the brain (cerebral edema)  · Fluid in the lungs (pulmonary edema)  · Damage to kidneys or other organs  What causes diabetic ketoacidosis?  In people with diabetes, DKA is most often caused by too little insulin in the body. It is also caused by:  · Poor management of diabetes  · Infections like a urinary tract infection or pneumonia  · Serious health problems, such as a heart attack  · Reactions to certain prescribed medicines and illicit drugs  Symptoms of diabetic ketoacidosis  DKA most often happens slowly over time. But it can worsen in a few hours if you are vomiting. The first symptoms are:  · Thirst and dry mouth  · Urinating a lot  · Belly pain  · Nausea or vomiting  · Breath that smells fruity (from the ketones)  Over time, these symptoms may happen:  · Dry or flushed skin  · Nausea and vomiting  · Loss of appetite  · Weight loss  · Belly pain  · Trouble  breathing  · Trouble thinking or confusion  · Feeling very tired or weak. This can lead to coma.  How is diabetic ketoacidosis diagnosed?  Your healthcare provider will ask about your medical history. He or she will give you a physical exam. You may also have these tests:  · Blood tests to check your glucose levels  · Blood tests to check your electrolytes, such as potassium and sodium  · Urine test to check for ketones  These tests are done to check for DKA, and monitor it over time.  How is diabetic ketoacidosis treated?  DKA needs treatment right away in the hospital. Treatment includes:  · Insulin. This is the main type of treatment. Insulin allows the cells to use the glucose in the blood. This lowers the levels of both blood glucose and ketones.  · Fluids and electrolytes. These are given through a vein (IV). Fluids are replaced and abnormal electrolyte levels are corrected.  · Other medicines. These may be given to treat an illness that caused DKA. For example, antibiotics may be given to treat a urinary tract infection that caused DKA.  Preventing diabetic ketoacidosis  To help prevent DKA, make sure you:  · Take all of your medicines for diabetes exactly as prescribed. This includes insulin.  · Check your blood glucose levels exactly as instructed.  · Be especially careful when you are sick with an illness or an infection. Take extra care to follow diabetes care instructions. Check your blood glucose more often.  · Do not exercise when your blood sugar is high and you have ketones in your urine.   · Check your urine ketone levels if told to do so. This is done with a urine test strip. Ask your healthcare provider how often to check your urine.     When to call your healthcare provider  Call your healthcare provider right away if you:  · Have symptoms of DKA  · Have very high blood glucose levels  · Are getting sick with another illness   Date Last Reviewed: 7/1/2016  © 5264-7035 [employer]  [address] [place] [address]. All rights reserved. This information is not intended as a substitute for professional medical care. Always follow your healthcare professional's instructions.

## 2020-04-10 ENCOUNTER — PATIENT MESSAGE (OUTPATIENT)
Dept: INTERNAL MEDICINE | Facility: CLINIC | Age: 55
End: 2020-04-10

## 2020-04-14 ENCOUNTER — PATIENT MESSAGE (OUTPATIENT)
Dept: INTERNAL MEDICINE | Facility: CLINIC | Age: 55
End: 2020-04-14

## 2020-04-15 ENCOUNTER — PATIENT MESSAGE (OUTPATIENT)
Dept: INTERNAL MEDICINE | Facility: CLINIC | Age: 55
End: 2020-04-15

## 2020-04-15 ENCOUNTER — OFFICE VISIT (OUTPATIENT)
Dept: INTERNAL MEDICINE | Facility: CLINIC | Age: 55
End: 2020-04-15
Payer: COMMERCIAL

## 2020-04-15 DIAGNOSIS — J30.1 SEASONAL ALLERGIC RHINITIS DUE TO POLLEN: ICD-10-CM

## 2020-04-15 DIAGNOSIS — R10.9 ABDOMINAL PAIN, UNSPECIFIED ABDOMINAL LOCATION: ICD-10-CM

## 2020-04-15 DIAGNOSIS — R05.9 COUGH: ICD-10-CM

## 2020-04-15 DIAGNOSIS — E10.9 TYPE 1 DIABETES MELLITUS WITHOUT COMPLICATION: ICD-10-CM

## 2020-04-15 DIAGNOSIS — Z09 HOSPITAL DISCHARGE FOLLOW-UP: Primary | ICD-10-CM

## 2020-04-15 PROCEDURE — 3051F HG A1C>EQUAL 7.0%<8.0%: CPT | Mod: CPTII,,, | Performed by: INTERNAL MEDICINE

## 2020-04-15 PROCEDURE — 3051F PR MOST RECENT HEMOGLOBIN A1C LEVEL 7.0 - < 8.0%: ICD-10-PCS | Mod: CPTII,,, | Performed by: INTERNAL MEDICINE

## 2020-04-15 PROCEDURE — 99214 OFFICE O/P EST MOD 30 MIN: CPT | Mod: GT,,, | Performed by: INTERNAL MEDICINE

## 2020-04-15 PROCEDURE — 99214 PR OFFICE/OUTPT VISIT, EST, LEVL IV, 30-39 MIN: ICD-10-PCS | Mod: GT,,, | Performed by: INTERNAL MEDICINE

## 2020-04-15 RX ORDER — BENZONATATE 200 MG/1
200 CAPSULE ORAL 3 TIMES DAILY PRN
Qty: 90 CAPSULE | Refills: 1 | Status: SHIPPED | OUTPATIENT
Start: 2020-04-15 | End: 2020-04-25

## 2020-04-15 RX ORDER — ALBUTEROL SULFATE 90 UG/1
2 AEROSOL, METERED RESPIRATORY (INHALATION) EVERY 4 HOURS PRN
Qty: 18 G | Refills: 2 | Status: SHIPPED | OUTPATIENT
Start: 2020-04-15 | End: 2020-11-04

## 2020-04-15 NOTE — PROGRESS NOTES
Patient ID: Halle Spence is a 54 y.o. female.    Chief Complaint: No chief complaint on file.    The patient location is: [place]  The chief complaint leading to consultation is: follow up after hospitalization for DKA  Visit type: audiovisual  Total time spent with patient: 25 minutes  Each patient to whom he or she provides medical services by telemedicine is:  (1) informed of the relationship between the physician and patient and the respective role of any other health care provider with respect to management of the patient; and (2) notified that he or she may decline to receive medical services by telemedicine and may withdraw from such care at any time.      BRET Neri is a 54 y.o. Female with type I DM who presents for follow up after hospitalization for DKA.  Patient was having symptoms of abdominal pain, vomiting, and dry heaves.  She thought she had a stomach virus.  Her blood glucose was going as high as 350.  She called my clinic but the clinic was closed.  She then called 911 because she was having hard time breathing and she felt like there was something sitting on her chest.  Upon arrival at the [facility], her blood glucose was 511.  She was hyperventilating.  She was diagnosed in the hospital as having DKA, aspiration pneumonia and pancreatitis.  She spent 2 days at [facility] and was transferred to the [facility].  She remained at [facility] until she was discharged on 4/2/2020. She has completed her antibiotic course for treatment of pneumonia. She is back on her insulin pump and BG numbers much improved (it is never >180). She will see her endocrinologist (who works outside of [facility]) in 2 weeks.    She has some lingering cough which is bothersome. No fevers. No nausea. But still with some lingering abdominal pain when trying to advance to solid foods. Had abdominal pain when trying to eat bland grilled chicken today.     When she goes outside, notices some chest  tightness. Still taking singulair daily, flonase occasionally. Feels that it is likely allergic in nature.    Review of Systems   Constitutional: Negative for fever.   Respiratory: Positive for cough and chest tightness.    Gastrointestinal: Positive for abdominal pain. Negative for nausea.       Objective:   There were no vitals filed for this visit.     Physical Exam   Constitutional: She is oriented to person, place, and time. She appears well-developed and well-nourished. No distress.   HENT:   Head: Normocephalic and atraumatic.   Right Ear: External ear normal.   Left Ear: External ear normal.   Nose: Nose normal.   Eyes: Conjunctivae and EOM are normal.   Pulmonary/Chest: Effort normal. No respiratory distress.   Neurological: She is alert and oriented to person, place, and time.   Skin: No rash noted. She is not diaphoretic. No erythema. No pallor.   Psychiatric: She has a normal mood and affect. Her behavior is normal. Judgment and thought content normal.       Assessment:       1. Hospital discharge follow-up    2. Cough Active   3. Abdominal pain, unspecified abdominal location Active   4. Type 1 diabetes mellitus without complication Well controlled   5. Seasonal allergic rhinitis due to pollen Active       Plan:         Hospital discharge follow-up    Cough  Comments:  Likely secondary to pneumonia. Discussed could linger for 6-8 weeks. Will try albuterol and tessalon perles for relief  Orders:  -     albuterol (PROVENTIL/VENTOLIN HFA) 90 mcg/actuation inhaler; Inhale 2 puffs into the lungs every 4 (four) hours as needed for Wheezing or Shortness of Breath (cough or chest tightness).  Dispense: 18 g; Refill: 2  -     benzonatate (TESSALON) 200 MG capsule; Take 1 capsule (200 mg total) by mouth 3 (three) times daily as needed for Cough.  Dispense: 90 capsule; Refill: 1    Abdominal pain, unspecified abdominal location  Comments:  Secondary to pancreatitis which is resolving. Discussed advancement of diet  (liquids-->bland diet-->regular diet). Can try boost glucose control for add. nellie.     Type 1 diabetes mellitus without complication  Comments:  Continue current insulin pump regimen as prescribed by Endo ; f/u with Endo    Seasonal allergic rhinitis due to pollen  Comments:  See instructions in AVS      25 minutes spent on video visit with >50% of time spent in discussion of recent hospitalization and current symptoms of cough and abdominal pain.     RTC PRN    Warning signs discussed, patient to call with any further issues or worsening of symptoms.       Parts of the above note were dictated using a voice dictation software. Please excuse any grammatical or typographical errors.

## 2020-04-15 NOTE — PATIENT INSTRUCTIONS
I am prescribing albuterol and tessalon perles for treatment of cough. Please let me know if this is helpful.     Continue singulair and flonase daily for allergy symptoms. Ok to use claritin D for short period of time; do not use longer than 3 days.     I recommend slowly advancing diet as we discussed. If you are not able to eat solid foods, you can try Boost Glucose Control to get some calories in.

## 2020-04-27 ENCOUNTER — PATIENT OUTREACH (OUTPATIENT)
Dept: ADMINISTRATIVE | Facility: HOSPITAL | Age: 55
End: 2020-04-27

## 2020-04-27 ENCOUNTER — PATIENT MESSAGE (OUTPATIENT)
Dept: INTERNAL MEDICINE | Facility: CLINIC | Age: 55
End: 2020-04-27

## 2020-04-27 ENCOUNTER — PATIENT MESSAGE (OUTPATIENT)
Dept: ADMINISTRATIVE | Facility: HOSPITAL | Age: 55
End: 2020-04-27

## 2020-05-08 DIAGNOSIS — E11.9 TYPE 2 DIABETES MELLITUS WITHOUT COMPLICATION: ICD-10-CM

## 2020-10-03 ENCOUNTER — OFFICE VISIT (OUTPATIENT)
Dept: URGENT CARE | Facility: CLINIC | Age: 55
End: 2020-10-03
Payer: COMMERCIAL

## 2020-10-03 VITALS
DIASTOLIC BLOOD PRESSURE: 81 MMHG | SYSTOLIC BLOOD PRESSURE: 135 MMHG | OXYGEN SATURATION: 99 % | TEMPERATURE: 97 F | HEIGHT: 65 IN | BODY MASS INDEX: 24.66 KG/M2 | HEART RATE: 94 BPM | WEIGHT: 148 LBS | RESPIRATION RATE: 18 BRPM

## 2020-10-03 DIAGNOSIS — J01.90 ACUTE SINUSITIS, RECURRENCE NOT SPECIFIED, UNSPECIFIED LOCATION: Primary | ICD-10-CM

## 2020-10-03 DIAGNOSIS — R05.9 COUGH: ICD-10-CM

## 2020-10-03 LAB
CTP QC/QA: YES
CTP QC/QA: YES
FLUAV AG NPH QL: NEGATIVE
FLUBV AG NPH QL: NEGATIVE
SARS-COV-2 RDRP RESP QL NAA+PROBE: NEGATIVE

## 2020-10-03 PROCEDURE — U0002 COVID-19 LAB TEST NON-CDC: HCPCS | Mod: QW,S$GLB,, | Performed by: NURSE PRACTITIONER

## 2020-10-03 PROCEDURE — 99214 OFFICE O/P EST MOD 30 MIN: CPT | Mod: 25,S$GLB,, | Performed by: NURSE PRACTITIONER

## 2020-10-03 PROCEDURE — 99214 PR OFFICE/OUTPT VISIT, EST, LEVL IV, 30-39 MIN: ICD-10-PCS | Mod: 25,S$GLB,, | Performed by: NURSE PRACTITIONER

## 2020-10-03 PROCEDURE — 87804 POCT INFLUENZA A/B: ICD-10-PCS | Mod: 59,QW,S$GLB, | Performed by: NURSE PRACTITIONER

## 2020-10-03 PROCEDURE — 87804 INFLUENZA ASSAY W/OPTIC: CPT | Mod: QW,S$GLB,, | Performed by: NURSE PRACTITIONER

## 2020-10-03 PROCEDURE — U0002: ICD-10-PCS | Mod: QW,S$GLB,, | Performed by: NURSE PRACTITIONER

## 2020-10-03 RX ORDER — BLOOD SUGAR DIAGNOSTIC
STRIP MISCELLANEOUS
COMMUNITY
Start: 2020-09-18

## 2020-10-03 RX ORDER — FLUTICASONE PROPIONATE 50 MCG
SPRAY, SUSPENSION (ML) NASAL
COMMUNITY
Start: 2020-09-19 | End: 2021-04-05

## 2020-10-03 NOTE — PROGRESS NOTES
"Subjective:       Patient ID: Halle Spence is a 54 y.o. female.    Vitals:  height is 5' 5" (1.651 m) and weight is 67.1 kg (148 lb). Her temporal temperature is 97.2 °F (36.2 °C). Her blood pressure is 135/81 and her pulse is 94. Her respiration is 18 and oxygen saturation is 99%.     Chief Complaint: COVID-19 Concerns    This is a 54 y.o. female with history of diabetes type one who presents today with a chief complaint of feeling chills, fatigue, ear pain with postnasal drip, sinus pain and pressure with sore throat and I itching started yesterday, denies fever or body aches, denies cough or shortness of breath, denies loss of taste or smell, denies nausea, vomiting diarrhea or abdominal pain, denies chest pain, or exertional chest pain, denies weakness, denies dizziness or positional lightheadedness, denies headache, patient denies contact with COVID-19 positive, patient reports she does have appointment with endocrinologist on Friday for follow-up, patient reports she had lab work done yesterday, patient reports urinary frequency, urgency or burning during urination, denies back or flank pain, patient is on insulin pump, currently showed me her blood sugar 200 on insulin pump, patient reports she was worried about COVID-19 and wanted to get tested, patient reports she is taking Allegra D for her sinuses, denies any purulent sputum or mucus production, patient is requesting flu test, patient reports she does have history of sinuses and takes medicine for it        Fatigue  This is a new problem. The current episode started yesterday. The problem occurs constantly. The problem has been unchanged. Associated symptoms include chills, diaphoresis, fatigue, headaches and a sore throat. Pertinent negatives include no congestion, coughing, fever, myalgias, nausea, rash or vomiting. Nothing aggravates the symptoms. Treatments tried: CLARATIN. The treatment provided moderate relief.       Constitution: Positive " for chills, sweating and fatigue. Negative for fever.   HENT: Positive for ear pain, postnasal drip, sinus pain, sinus pressure and sore throat. Negative for congestion and voice change.    Neck: Negative for painful lymph nodes.   Eyes: Positive for eye itching. Negative for eye redness.   Respiratory: Negative for chest tightness, cough, sputum production, bloody sputum, COPD, shortness of breath, stridor, wheezing and asthma.    Gastrointestinal: Negative for nausea and vomiting.   Musculoskeletal: Negative for muscle ache.   Skin: Negative for rash.   Allergic/Immunologic: Negative for seasonal allergies and asthma.   Neurological: Positive for headaches.   Hematologic/Lymphatic: Negative for swollen lymph nodes.       Objective:      Physical Exam   Constitutional: She is oriented to person, place, and time. She appears well-developed. She is cooperative.  Non-toxic appearance. She does not appear ill. No distress.      Comments:Patient sitting comfortably on the exam table, non toxic appearance  and answering questions appropriately, no acute distress     HENT:   Head: Normocephalic and atraumatic.   Ears:   Right Ear: Hearing, tympanic membrane, external ear and ear canal normal. No drainage. Tympanic membrane is not erythematous. No middle ear effusion.   Left Ear: Hearing, tympanic membrane, external ear and ear canal normal. No drainage. Tympanic membrane is not erythematous.  No middle ear effusion.   Nose: No mucosal edema, rhinorrhea or nasal deformity. No epistaxis. Right sinus exhibits frontal sinus tenderness. Right sinus exhibits no maxillary sinus tenderness. Left sinus exhibits frontal sinus tenderness. Left sinus exhibits no maxillary sinus tenderness.   Mouth/Throat: Uvula is midline, oropharynx is clear and moist and mucous membranes are normal. No trismus in the jaw. Normal dentition. No uvula swelling. No oropharyngeal exudate, posterior oropharyngeal edema, posterior oropharyngeal erythema,  tonsillar abscesses or cobblestoning.   Eyes: Conjunctivae and lids are normal. Right conjunctiva is not injected. Left conjunctiva is not injected. No scleral icterus. Right eye exhibits normal extraocular motion and no nystagmus. Left eye exhibits normal extraocular motion and no nystagmus. extraocular movement intact  Neck: Trachea normal, full passive range of motion without pain and phonation normal. Neck supple. No neck rigidity. No edema and no erythema present.   Cardiovascular: Normal rate, regular rhythm, normal heart sounds and normal pulses.   Pulmonary/Chest: Effort normal and breath sounds normal. No stridor. No respiratory distress. She has no decreased breath sounds. She has no wheezes. She has no rhonchi. She has no rales.   Abdominal: Normal appearance.   Musculoskeletal: Normal range of motion.         General: No deformity.   Neurological: She is alert and oriented to person, place, and time. She exhibits normal muscle tone. Coordination normal.   Skin: Skin is warm, dry, intact, not diaphoretic and not pale. Psychiatric: Her speech is normal and behavior is normal. Judgment and thought content normal.   Nursing note and vitals reviewed.        Results for orders placed or performed in visit on 10/03/20   POCT COVID-19 Rapid Screening   Result Value Ref Range    POC Rapid COVID Negative Negative     Acceptable Yes    POCT Influenza A/B   Result Value Ref Range    Rapid Influenza A Ag Negative Negative    Rapid Influenza B Ag Negative Negative     Acceptable Yes          Patient in no acute distress and nontoxic appearing.  Vitals reassuring.  Negative COVID-19 and flu test discussed with patient in depth.  I offered urinalysis due to patient complaint of frequency of urination, patient declined.  I offered Chem 8 for further evaluation, but patient declined and preferred to follow-up with her endocrinologist.  I reiterated the importance of further evaluation if  symptom worsens or does not improve.  Red flags discussed with patient in detail.  Patient discharged with strict ER precautions.    Discussed results/diagnosis/plan in depth with patient in clinic. Strict precautions given to patient to monitor for worsening signs and symptoms. Advised to follow up with primary.All questions answered. Strict ER precautions given. If your symptoms worsens of fail to improve you should go to the Emergency Room. Discharge and follow-up instructions given verbally/printed. Patient voiced understanding and in agreement with current treatment plan.        Assessment:       1. Acute sinusitis, recurrence not specified, unspecified location    2. Cough        Plan:         Acute sinusitis, recurrence not specified, unspecified location    Cough  -     POCT COVID-19 Rapid Screening  -     POCT Influenza A/B      Patient Instructions   PLEASE READ YOUR DISCHARGE INSTRUCTIONS ENTIRELY AS IT CONTAINS IMPORTANT INFORMATION.    Try over the counter afrin, but for NO LONGER than 3 days as it can cause rebound congestion. Then you can switch to flonase if you find the nasal sprays are working well.     If you find this dries your nose out or your nose bleeds, try using over the counter nasal saline a few minutes prior to using the flonase to moisten the lining of your nose and throughout the day as needed.     Please take an over the counter antihistamine medication (allegra/Claritin/Zyrtec) of your choice as directed and mucinex-D (if it gives you funny heartbeats you can switch to regular mucinex).     You can try breathe right strips at night to help you breathe.  A cool mist humidifier in bedroom may help with cough and relieve stuffy nose.     Sore throat recommendations: Warm fluids, warm salt water gargles, throat lozenges, tea, honey, soup, rest, hydration.     Sinus rinses DO NOT USE TAP WATER, if you must, water must be a rolling boil for 1 minute, let it cool, then use.  May use  distilled water, or over the counter nasal saline rinses.  Vics vapor rub in shower to help open nasal passages.  May use nasal gel to keep passages moisturized.  May use Nasal saline sprays during the day for added relief of congestion.   For those who go to the gym, please do not use the sauna or steam room now to clear sinuses.    During pollen season, change shirt if you are outside for a while when you go in.  Also wash your face.  Do not touch your face with your hands.  Wash your hands often in general while ill, avoid face contact with hands. Good nutrition. Lots of rest. Plenty of fluids    Over the counter you can use Tylenol (acetominophen) or Ibuprofen for your minor aches and pains as long as you have no contraindications.        Please return or see your primary care doctor if you develop new or worsening symptoms.     Please arrange follow up with your primary medical clinic as soon as possible. You must understand that you've received an Urgent Care treatment only and that you may be released before all of your medical problems are known or treated. You, the patient, will arrange for follow up as instructed. If your symptoms worsen or fail to improve you should go to the Emergency Room.            Acute Sinusitis    Acute sinusitis is irritation and swelling of the sinuses. It is usually caused by a viral infection after a common cold. Your doctor can help you find relief.  What is acute sinusitis?  Sinuses are air-filled spaces in the skull behind the face. They are kept moist and clean by a lining of mucosa. Things such as pollen, smoke, and chemical fumes can irritate the mucosa. It can then swell up. As a response to irritation, the mucosa makes more mucus and other fluids. Tiny hairlike cilia cover the mucosa. Cilia help carry mucus toward the opening of the sinus. Too much mucus may cause the cilia to stop working. This blocks the sinus opening. A buildup of fluid in the sinuses then causes  pain and pressure. It can also encourage bacteria to grow in the sinuses.  Common symptoms of acute sinusitis  You may have:  · Facial soreness pain  · Headache  · Fever  · Fluid draining in the back of the throat (postnasal drip)  · Congestion  · Drainage that is thick and colored, instead of clear  · Cough  Diagnosing acute sinusitis  Your doctor will ask about your symptoms and health history. He or she will look at your ear, nose, and throat. You usually won't need to have X-rays taken.    The doctor may take a sample of mucus to check for bacteria. If you have sinusitis that keeps coming back, you may need imaging tests such as X-rays or CAT scans. This will help your doctor check for a structural problem that may be causing the infection.  Treating acute sinusitis  Treatment is aimed at unblocking the sinus opening and helping the cilia work again. You may need to take antihistamine and decongestant medicine. These can reduce inflammation and decrease the amount of fluid your sinuses make. If you have a bacterial infection, you will need to take antibiotic medicine for 10 to 14 days. Take this medicine until it is gone, even if you feel better.  Date Last Reviewed: 10/1/2016  © 3605-5235 [employer]. [address] [place] [address], [place] [address]. All rights reserved. This information is not intended as a substitute for professional medical care. Always follow your healthcare professional's instructions.

## 2020-10-03 NOTE — PATIENT INSTRUCTIONS
PLEASE READ YOUR DISCHARGE INSTRUCTIONS ENTIRELY AS IT CONTAINS IMPORTANT INFORMATION.    Try over the counter afrin, but for NO LONGER than 3 days as it can cause rebound congestion. Then you can switch to flonase if you find the nasal sprays are working well.     If you find this dries your nose out or your nose bleeds, try using over the counter nasal saline a few minutes prior to using the flonase to moisten the lining of your nose and throughout the day as needed.     Please take an over the counter antihistamine medication (allegra/Claritin/Zyrtec) of your choice as directed and mucinex-D (if it gives you funny heartbeats you can switch to regular mucinex).     You can try breathe right strips at night to help you breathe.  A cool mist humidifier in bedroom may help with cough and relieve stuffy nose.     Sore throat recommendations: Warm fluids, warm salt water gargles, throat lozenges, tea, honey, soup, rest, hydration.     Sinus rinses DO NOT USE TAP WATER, if you must, water must be a rolling boil for 1 minute, let it cool, then use.  May use distilled water, or over the counter nasal saline rinses.  Vics vapor rub in shower to help open nasal passages.  May use nasal gel to keep passages moisturized.  May use Nasal saline sprays during the day for added relief of congestion.   For those who go to the gym, please do not use the sauna or steam room now to clear sinuses.    During pollen season, change shirt if you are outside for a while when you go in.  Also wash your face.  Do not touch your face with your hands.  Wash your hands often in general while ill, avoid face contact with hands. Good nutrition. Lots of rest. Plenty of fluids    Over the counter you can use Tylenol (acetominophen) or Ibuprofen for your minor aches and pains as long as you have no contraindications.        Please return or see your primary care doctor if you develop new or worsening symptoms.     Please arrange follow up with your  primary medical clinic as soon as possible. You must understand that you've received an Urgent Care treatment only and that you may be released before all of your medical problems are known or treated. You, the patient, will arrange for follow up as instructed. If your symptoms worsen or fail to improve you should go to the Emergency Room.            Acute Sinusitis    Acute sinusitis is irritation and swelling of the sinuses. It is usually caused by a viral infection after a common cold. Your doctor can help you find relief.  What is acute sinusitis?  Sinuses are air-filled spaces in the skull behind the face. They are kept moist and clean by a lining of mucosa. Things such as pollen, smoke, and chemical fumes can irritate the mucosa. It can then swell up. As a response to irritation, the mucosa makes more mucus and other fluids. Tiny hairlike cilia cover the mucosa. Cilia help carry mucus toward the opening of the sinus. Too much mucus may cause the cilia to stop working. This blocks the sinus opening. A buildup of fluid in the sinuses then causes pain and pressure. It can also encourage bacteria to grow in the sinuses.  Common symptoms of acute sinusitis  You may have:  · Facial soreness pain  · Headache  · Fever  · Fluid draining in the back of the throat (postnasal drip)  · Congestion  · Drainage that is thick and colored, instead of clear  · Cough  Diagnosing acute sinusitis  Your doctor will ask about your symptoms and health history. He or she will look at your ear, nose, and throat. You usually won't need to have X-rays taken.    The doctor may take a sample of mucus to check for bacteria. If you have sinusitis that keeps coming back, you may need imaging tests such as X-rays or CAT scans. This will help your doctor check for a structural problem that may be causing the infection.  Treating acute sinusitis  Treatment is aimed at unblocking the sinus opening and helping the cilia work again. You may need to  take antihistamine and decongestant medicine. These can reduce inflammation and decrease the amount of fluid your sinuses make. If you have a bacterial infection, you will need to take antibiotic medicine for 10 to 14 days. Take this medicine until it is gone, even if you feel better.  Date Last Reviewed: 10/1/2016  © 8271-5719 [employer]. [address] [place] [address], [place] [address]. All rights reserved. This information is not intended as a substitute for professional medical care. Always follow your healthcare professional's instructions.

## 2020-10-05 ENCOUNTER — PATIENT MESSAGE (OUTPATIENT)
Dept: ADMINISTRATIVE | Facility: HOSPITAL | Age: 55
End: 2020-10-05

## 2020-10-13 ENCOUNTER — TELEPHONE (OUTPATIENT)
Dept: FAMILY MEDICINE | Facility: CLINIC | Age: 55
End: 2020-10-13

## 2020-10-13 NOTE — LETTER
AUTHORIZATION FOR RELEASE OF   CONFIDENTIAL INFORMATION    Dear Dr. Luz Donaldson,    We are seeing Halle Spence, date of birth 1965, in the clinic at [facility]. Vilma Milan MD is the patient's PCP. Halle Spence has an outstanding lab/procedure at the time we reviewed her chart. In order to help keep her health information updated, she has authorized us to request the following medical record(s):       (X)   LAB RESULTS             Please fax records to [facility], Shelly L Swindler, MD,               [facility]                                                                     Please Fax to [facility] at [phone].     Thank you for your help, Jessica Akbar [facility]. If I can be of any assistance you can call at [phone]                        Patient Name: Halle Spence  : 1965  Patient Phone #: [phone]

## 2020-10-30 ENCOUNTER — PATIENT MESSAGE (OUTPATIENT)
Dept: ADMINISTRATIVE | Facility: HOSPITAL | Age: 55
End: 2020-10-30

## 2020-11-07 ENCOUNTER — PATIENT MESSAGE (OUTPATIENT)
Dept: ADMINISTRATIVE | Facility: HOSPITAL | Age: 55
End: 2020-11-07

## 2020-12-16 ENCOUNTER — PATIENT MESSAGE (OUTPATIENT)
Dept: ADMINISTRATIVE | Facility: HOSPITAL | Age: 55
End: 2020-12-16

## 2021-01-05 ENCOUNTER — PATIENT MESSAGE (OUTPATIENT)
Dept: ADMINISTRATIVE | Facility: HOSPITAL | Age: 56
End: 2021-01-05

## 2021-01-11 LAB — HBA1C MFR BLD: 7.8 % (ref 4–6)

## 2021-02-10 ENCOUNTER — PATIENT MESSAGE (OUTPATIENT)
Dept: ADMINISTRATIVE | Facility: HOSPITAL | Age: 56
End: 2021-02-10

## 2021-02-12 ENCOUNTER — PATIENT OUTREACH (OUTPATIENT)
Dept: ADMINISTRATIVE | Facility: HOSPITAL | Age: 56
End: 2021-02-12

## 2021-02-18 ENCOUNTER — PATIENT OUTREACH (OUTPATIENT)
Dept: ADMINISTRATIVE | Facility: HOSPITAL | Age: 56
End: 2021-02-18

## 2021-03-08 ENCOUNTER — PATIENT MESSAGE (OUTPATIENT)
Dept: ADMINISTRATIVE | Facility: HOSPITAL | Age: 56
End: 2021-03-08

## 2021-03-08 ENCOUNTER — PATIENT OUTREACH (OUTPATIENT)
Dept: ADMINISTRATIVE | Facility: HOSPITAL | Age: 56
End: 2021-03-08

## 2021-03-10 ENCOUNTER — PATIENT OUTREACH (OUTPATIENT)
Dept: ADMINISTRATIVE | Facility: HOSPITAL | Age: 56
End: 2021-03-10

## 2021-03-17 ENCOUNTER — PATIENT OUTREACH (OUTPATIENT)
Dept: ADMINISTRATIVE | Facility: HOSPITAL | Age: 56
End: 2021-03-17

## 2021-03-19 ENCOUNTER — PATIENT MESSAGE (OUTPATIENT)
Dept: INTERNAL MEDICINE | Facility: CLINIC | Age: 56
End: 2021-03-19

## 2021-03-19 ENCOUNTER — OFFICE VISIT (OUTPATIENT)
Dept: INTERNAL MEDICINE | Facility: CLINIC | Age: 56
End: 2021-03-19
Payer: COMMERCIAL

## 2021-03-19 ENCOUNTER — HOSPITAL ENCOUNTER (OUTPATIENT)
Dept: RADIOLOGY | Facility: HOSPITAL | Age: 56
Discharge: HOME | End: 2021-03-19
Attending: INTERNAL MEDICINE
Payer: COMMERCIAL

## 2021-03-19 ENCOUNTER — TELEPHONE (OUTPATIENT)
Dept: ADMINISTRATIVE | Facility: OTHER | Age: 56
End: 2021-03-19

## 2021-03-19 VITALS
DIASTOLIC BLOOD PRESSURE: 72 MMHG | BODY MASS INDEX: 27.18 KG/M2 | WEIGHT: 163.38 LBS | TEMPERATURE: 97 F | HEART RATE: 62 BPM | OXYGEN SATURATION: 99 % | SYSTOLIC BLOOD PRESSURE: 120 MMHG

## 2021-03-19 DIAGNOSIS — R05.3 CHRONIC COUGH: ICD-10-CM

## 2021-03-19 DIAGNOSIS — Z12.11 ENCOUNTER FOR SCREENING COLONOSCOPY: ICD-10-CM

## 2021-03-19 DIAGNOSIS — Z12.31 SCREENING MAMMOGRAM, ENCOUNTER FOR: ICD-10-CM

## 2021-03-19 DIAGNOSIS — E10.9 TYPE 1 DIABETES MELLITUS WITHOUT COMPLICATION: ICD-10-CM

## 2021-03-19 DIAGNOSIS — E55.9 VITAMIN D INSUFFICIENCY: ICD-10-CM

## 2021-03-19 DIAGNOSIS — J30.1 SEASONAL ALLERGIC RHINITIS DUE TO POLLEN: ICD-10-CM

## 2021-03-19 DIAGNOSIS — Z00.00 ANNUAL PHYSICAL EXAM: ICD-10-CM

## 2021-03-19 DIAGNOSIS — E66.3 OVERWEIGHT (BMI 25.0-29.9): ICD-10-CM

## 2021-03-19 PROCEDURE — 99999 PR PBB SHADOW E&M-EST. PATIENT-LVL IV: CPT | Mod: PBBFAC,,, | Performed by: INTERNAL MEDICINE

## 2021-03-19 PROCEDURE — 99999 PR PBB SHADOW E&M-EST. PATIENT-LVL IV: ICD-10-PCS | Mod: PBBFAC,,, | Performed by: INTERNAL MEDICINE

## 2021-03-19 PROCEDURE — 3051F HG A1C>EQUAL 7.0%<8.0%: CPT | Mod: CPTII,S$GLB,, | Performed by: INTERNAL MEDICINE

## 2021-03-19 PROCEDURE — 99396 PREV VISIT EST AGE 40-64: CPT | Mod: S$GLB,,, | Performed by: INTERNAL MEDICINE

## 2021-03-19 PROCEDURE — 3008F PR BODY MASS INDEX (BMI) DOCUMENTED: ICD-10-PCS | Mod: CPTII,S$GLB,, | Performed by: INTERNAL MEDICINE

## 2021-03-19 PROCEDURE — 71046 X-RAY EXAM CHEST 2 VIEWS: CPT | Mod: 26,,, | Performed by: RADIOLOGY

## 2021-03-19 PROCEDURE — 71046 X-RAY EXAM CHEST 2 VIEWS: CPT | Mod: TC,FY,PO

## 2021-03-19 PROCEDURE — 1126F PR PAIN SEVERITY QUANTIFIED, NO PAIN PRESENT: ICD-10-PCS | Mod: S$GLB,,, | Performed by: INTERNAL MEDICINE

## 2021-03-19 PROCEDURE — 3008F BODY MASS INDEX DOCD: CPT | Mod: CPTII,S$GLB,, | Performed by: INTERNAL MEDICINE

## 2021-03-19 PROCEDURE — 1126F AMNT PAIN NOTED NONE PRSNT: CPT | Mod: S$GLB,,, | Performed by: INTERNAL MEDICINE

## 2021-03-19 PROCEDURE — 3051F PR MOST RECENT HEMOGLOBIN A1C LEVEL 7.0 - < 8.0%: ICD-10-PCS | Mod: CPTII,S$GLB,, | Performed by: INTERNAL MEDICINE

## 2021-03-19 PROCEDURE — 71046 XR CHEST PA AND LATERAL: ICD-10-PCS | Mod: 26,,, | Performed by: RADIOLOGY

## 2021-03-19 PROCEDURE — 99396 PR PREVENTIVE VISIT,EST,40-64: ICD-10-PCS | Mod: S$GLB,,, | Performed by: INTERNAL MEDICINE

## 2021-03-24 ENCOUNTER — PATIENT OUTREACH (OUTPATIENT)
Dept: ADMINISTRATIVE | Facility: HOSPITAL | Age: 56
End: 2021-03-24

## 2021-03-24 ENCOUNTER — PATIENT MESSAGE (OUTPATIENT)
Dept: ADMINISTRATIVE | Facility: HOSPITAL | Age: 56
End: 2021-03-24

## 2021-03-24 DIAGNOSIS — E11.9 TYPE 2 DIABETES MELLITUS WITHOUT COMPLICATION, UNSPECIFIED WHETHER LONG TERM INSULIN USE: ICD-10-CM

## 2021-03-31 DIAGNOSIS — E11.9 TYPE 2 DIABETES MELLITUS WITHOUT COMPLICATION: ICD-10-CM

## 2021-04-05 ENCOUNTER — PATIENT MESSAGE (OUTPATIENT)
Dept: ADMINISTRATIVE | Facility: HOSPITAL | Age: 56
End: 2021-04-05

## 2021-05-07 LAB
ALBUMIN CREATININE RATIO: 4 MG/G (ref 0–29)
ALBUMIN MFR UR ELPH: 3.2 %
CREATININE URINE: 83.8
HBA1C MFR BLD: 7.4 % (ref 4–6)

## 2021-05-14 ENCOUNTER — PATIENT MESSAGE (OUTPATIENT)
Dept: ADMINISTRATIVE | Facility: HOSPITAL | Age: 56
End: 2021-05-14

## 2021-05-14 ENCOUNTER — PATIENT OUTREACH (OUTPATIENT)
Dept: ADMINISTRATIVE | Facility: HOSPITAL | Age: 56
End: 2021-05-14

## 2021-07-06 ENCOUNTER — PATIENT MESSAGE (OUTPATIENT)
Dept: ADMINISTRATIVE | Facility: HOSPITAL | Age: 56
End: 2021-07-06

## 2021-08-14 LAB — HBA1C MFR BLD: 7.2 % (ref 4–6)

## 2021-10-04 ENCOUNTER — PATIENT MESSAGE (OUTPATIENT)
Dept: ADMINISTRATIVE | Facility: HOSPITAL | Age: 56
End: 2021-10-04

## 2021-12-01 LAB — ALBUMIN CREATININE RATIO: 7 MG/G (ref 0–29)

## 2021-12-03 ENCOUNTER — PATIENT OUTREACH (OUTPATIENT)
Dept: ADMINISTRATIVE | Facility: HOSPITAL | Age: 56
End: 2021-12-03
Payer: COMMERCIAL

## 2021-12-16 ENCOUNTER — PATIENT MESSAGE (OUTPATIENT)
Dept: ADMINISTRATIVE | Facility: HOSPITAL | Age: 56
End: 2021-12-16
Payer: COMMERCIAL

## 2022-02-14 ENCOUNTER — HOSPITAL ENCOUNTER (OUTPATIENT)
Dept: RADIOLOGY | Facility: HOSPITAL | Age: 57
Discharge: HOME | End: 2022-02-14
Attending: INTERNAL MEDICINE
Payer: COMMERCIAL

## 2022-02-14 DIAGNOSIS — Z12.31 SCREENING MAMMOGRAM, ENCOUNTER FOR: ICD-10-CM

## 2022-02-14 PROCEDURE — 77067 SCR MAMMO BI INCL CAD: CPT | Mod: 26,,, | Performed by: INTERNAL MEDICINE

## 2022-02-14 PROCEDURE — 77063 BREAST TOMOSYNTHESIS BI: CPT | Mod: TC

## 2022-02-14 PROCEDURE — 77067 SCR MAMMO BI INCL CAD: CPT | Mod: TC

## 2022-02-14 PROCEDURE — 77063 MAMMO DIGITAL SCREENING BILAT WITH TOMO: ICD-10-PCS | Mod: 26,,, | Performed by: INTERNAL MEDICINE

## 2022-02-14 PROCEDURE — 77067 MAMMO DIGITAL SCREENING BILAT WITH TOMO: ICD-10-PCS | Mod: 26,,, | Performed by: INTERNAL MEDICINE

## 2022-02-14 PROCEDURE — 77063 BREAST TOMOSYNTHESIS BI: CPT | Mod: 26,,, | Performed by: INTERNAL MEDICINE

## 2022-02-23 DIAGNOSIS — D84.9 IMMUNOSUPPRESSED STATUS: ICD-10-CM

## 2022-03-14 LAB
CHOLESTEROL, TOTAL: 250 MG/DL (ref 100–199)
HDLC SERPL-MCNC: 94 MG/DL (ref 39–?)
LDL/HDL RATIO: 1.5 RATIO (ref 0–3.2)
LDLC SERPL CALC-MCNC: 139 MG/DL (ref 0–99)
TRIGL SERPL-MCNC: 101 MG/DL (ref 40–160)
VLDLC SERPL-MCNC: 17 MG/DL (ref 5–40)

## 2022-03-15 LAB — HBA1C MFR BLD: 7.8 % (ref 4–6)

## 2022-05-31 ENCOUNTER — PATIENT MESSAGE (OUTPATIENT)
Dept: ADMINISTRATIVE | Facility: HOSPITAL | Age: 57
End: 2022-05-31
Payer: COMMERCIAL

## 2022-08-16 ENCOUNTER — PATIENT OUTREACH (OUTPATIENT)
Dept: ADMINISTRATIVE | Facility: HOSPITAL | Age: 57
End: 2022-08-16
Payer: COMMERCIAL

## 2022-08-16 NOTE — PROGRESS NOTES
2022 Care Everywhere updates requested and reviewed.  Immunizations reconciled. Media reports reviewed.  Duplicate HM overrides and  orders removed.  Overdue HM topic chart audit and/or requested.  Overdue lab testing linked to upcoming lab appointments if applies.    Lab jermain, and [facility] reviewed   HM updated with A1C and   Health Maintenance Due   Topic Date Due    Pneumococcal Vaccines (Age 0-64) (1 - PCV) Never done    Foot Exam  Never done    HIV Screening  Never done    Low Dose Statin  Never done    Colorectal Cancer Screening  Never done    Shingles Vaccine (1 of 2) Never done    Eye Exam  2020    COVID-19 Vaccine (4 - Booster for Moderna series) 2022    Diabetes Urine Screening  2022

## 2022-11-09 DIAGNOSIS — E11.9 TYPE 2 DIABETES MELLITUS WITHOUT COMPLICATION: ICD-10-CM

## 2022-11-09 DIAGNOSIS — E10.8 TYPE 1 DIABETES MELLITUS WITH COMPLICATIONS: ICD-10-CM

## 2022-11-09 DIAGNOSIS — E11.9 TYPE 2 DIABETES MELLITUS WITHOUT COMPLICATION, UNSPECIFIED WHETHER LONG TERM INSULIN USE: ICD-10-CM

## 2022-11-14 ENCOUNTER — PATIENT MESSAGE (OUTPATIENT)
Dept: ADMINISTRATIVE | Facility: HOSPITAL | Age: 57
End: 2022-11-14
Payer: COMMERCIAL

## 2022-11-21 ENCOUNTER — PATIENT OUTREACH (OUTPATIENT)
Dept: ADMINISTRATIVE | Facility: HOSPITAL | Age: 57
End: 2022-11-21
Payer: COMMERCIAL

## 2022-11-21 ENCOUNTER — PATIENT MESSAGE (OUTPATIENT)
Dept: ADMINISTRATIVE | Facility: HOSPITAL | Age: 57
End: 2022-11-21
Payer: COMMERCIAL

## 2022-11-21 DIAGNOSIS — E10.8 TYPE 1 DIABETES MELLITUS WITH COMPLICATIONS: Primary | ICD-10-CM

## 2022-11-21 NOTE — PROGRESS NOTES
2022 Care Everywhere updates requested and reviewed.  Immunizations reconciled. Media reports reviewed.  Duplicate HM overrides and  orders removed.  Overdue HM topic chart audit and/or requested.  Overdue lab testing linked to upcoming lab appointments if applies.  Lab jermain, and [employer] reviewed      Health Maintenance Due   Topic Date Due    Pneumococcal Vaccines (Age 0-64) (1 - PCV) Never done    Foot Exam  Never done    HIV Screening  Never done    Low Dose Statin  Never done    Colorectal Cancer Screening  Never done    Shingles Vaccine (1 of 2) Never done    Eye Exam  2020    COVID-19 Vaccine (4 - Booster for Moderna series) 2022    Influenza Vaccine (1) 2022    Hemoglobin A1c  2022    Diabetes Urine Screening  2022

## 2022-12-05 ENCOUNTER — OFFICE VISIT (OUTPATIENT)
Dept: INTERNAL MEDICINE | Facility: CLINIC | Age: 57
End: 2022-12-05
Payer: COMMERCIAL

## 2022-12-05 VITALS
DIASTOLIC BLOOD PRESSURE: 68 MMHG | HEIGHT: 65 IN | HEART RATE: 82 BPM | WEIGHT: 147.69 LBS | OXYGEN SATURATION: 99 % | BODY MASS INDEX: 24.61 KG/M2 | SYSTOLIC BLOOD PRESSURE: 94 MMHG

## 2022-12-05 DIAGNOSIS — E55.9 VITAMIN D INSUFFICIENCY: ICD-10-CM

## 2022-12-05 DIAGNOSIS — E10.8 TYPE 1 DIABETES MELLITUS WITH COMPLICATIONS: Chronic | ICD-10-CM

## 2022-12-05 DIAGNOSIS — Z12.11 ENCOUNTER FOR SCREENING COLONOSCOPY: ICD-10-CM

## 2022-12-05 DIAGNOSIS — G43.909 MIGRAINE WITHOUT STATUS MIGRAINOSUS, NOT INTRACTABLE, UNSPECIFIED MIGRAINE TYPE: ICD-10-CM

## 2022-12-05 DIAGNOSIS — E03.8 HYPOTHYROIDISM DUE TO HASHIMOTO'S THYROIDITIS: Chronic | ICD-10-CM

## 2022-12-05 DIAGNOSIS — Z23 NEED FOR INFLUENZA VACCINATION: ICD-10-CM

## 2022-12-05 DIAGNOSIS — E06.3 HYPOTHYROIDISM DUE TO HASHIMOTO'S THYROIDITIS: Chronic | ICD-10-CM

## 2022-12-05 DIAGNOSIS — Z00.00 ANNUAL PHYSICAL EXAM: Primary | ICD-10-CM

## 2022-12-05 PROCEDURE — 3051F PR MOST RECENT HEMOGLOBIN A1C LEVEL 7.0 - < 8.0%: ICD-10-PCS | Mod: CPTII,S$GLB,, | Performed by: INTERNAL MEDICINE

## 2022-12-05 PROCEDURE — 99396 PR PREVENTIVE VISIT,EST,40-64: ICD-10-PCS | Mod: 25,S$GLB,, | Performed by: INTERNAL MEDICINE

## 2022-12-05 PROCEDURE — 1160F PR REVIEW ALL MEDS BY PRESCRIBER/CLIN PHARMACIST DOCUMENTED: ICD-10-PCS | Mod: CPTII,S$GLB,, | Performed by: INTERNAL MEDICINE

## 2022-12-05 PROCEDURE — 90471 FLU VACCINE (QUAD) GREATER THAN OR EQUAL TO 3YO PRESERVATIVE FREE IM: ICD-10-PCS | Mod: S$GLB,,, | Performed by: INTERNAL MEDICINE

## 2022-12-05 PROCEDURE — 1159F MED LIST DOCD IN RCRD: CPT | Mod: CPTII,S$GLB,, | Performed by: INTERNAL MEDICINE

## 2022-12-05 PROCEDURE — 90686 IIV4 VACC NO PRSV 0.5 ML IM: CPT | Mod: S$GLB,,, | Performed by: INTERNAL MEDICINE

## 2022-12-05 PROCEDURE — 99999 PR PBB SHADOW E&M-EST. PATIENT-LVL III: CPT | Mod: PBBFAC,,, | Performed by: INTERNAL MEDICINE

## 2022-12-05 PROCEDURE — 90686 FLU VACCINE (QUAD) GREATER THAN OR EQUAL TO 3YO PRESERVATIVE FREE IM: ICD-10-PCS | Mod: S$GLB,,, | Performed by: INTERNAL MEDICINE

## 2022-12-05 PROCEDURE — 3008F BODY MASS INDEX DOCD: CPT | Mod: CPTII,S$GLB,, | Performed by: INTERNAL MEDICINE

## 2022-12-05 PROCEDURE — 3008F PR BODY MASS INDEX (BMI) DOCUMENTED: ICD-10-PCS | Mod: CPTII,S$GLB,, | Performed by: INTERNAL MEDICINE

## 2022-12-05 PROCEDURE — 1159F PR MEDICATION LIST DOCUMENTED IN MEDICAL RECORD: ICD-10-PCS | Mod: CPTII,S$GLB,, | Performed by: INTERNAL MEDICINE

## 2022-12-05 PROCEDURE — 3074F PR MOST RECENT SYSTOLIC BLOOD PRESSURE < 130 MM HG: ICD-10-PCS | Mod: CPTII,S$GLB,, | Performed by: INTERNAL MEDICINE

## 2022-12-05 PROCEDURE — 3074F SYST BP LT 130 MM HG: CPT | Mod: CPTII,S$GLB,, | Performed by: INTERNAL MEDICINE

## 2022-12-05 PROCEDURE — 90471 IMMUNIZATION ADMIN: CPT | Mod: S$GLB,,, | Performed by: INTERNAL MEDICINE

## 2022-12-05 PROCEDURE — 3078F DIAST BP <80 MM HG: CPT | Mod: CPTII,S$GLB,, | Performed by: INTERNAL MEDICINE

## 2022-12-05 PROCEDURE — 99999 PR PBB SHADOW E&M-EST. PATIENT-LVL III: ICD-10-PCS | Mod: PBBFAC,,, | Performed by: INTERNAL MEDICINE

## 2022-12-05 PROCEDURE — 1160F RVW MEDS BY RX/DR IN RCRD: CPT | Mod: CPTII,S$GLB,, | Performed by: INTERNAL MEDICINE

## 2022-12-05 PROCEDURE — 3078F PR MOST RECENT DIASTOLIC BLOOD PRESSURE < 80 MM HG: ICD-10-PCS | Mod: CPTII,S$GLB,, | Performed by: INTERNAL MEDICINE

## 2022-12-05 PROCEDURE — 3051F HG A1C>EQUAL 7.0%<8.0%: CPT | Mod: CPTII,S$GLB,, | Performed by: INTERNAL MEDICINE

## 2022-12-05 PROCEDURE — 99396 PREV VISIT EST AGE 40-64: CPT | Mod: 25,S$GLB,, | Performed by: INTERNAL MEDICINE

## 2022-12-05 RX ORDER — BLOOD-GLUCOSE SENSOR
EACH MISCELLANEOUS
COMMUNITY
Start: 2022-09-23

## 2022-12-05 RX ORDER — AMITRIPTYLINE HYDROCHLORIDE 10 MG/1
10 TABLET, FILM COATED ORAL NIGHTLY
COMMUNITY
Start: 2022-11-22

## 2022-12-05 RX ORDER — BLOOD-GLUCOSE SENSOR
EACH MISCELLANEOUS
COMMUNITY
Start: 2022-07-13

## 2022-12-05 RX ORDER — ONDANSETRON 4 MG/1
4 TABLET, ORALLY DISINTEGRATING ORAL EVERY 8 HOURS PRN
COMMUNITY

## 2022-12-05 RX ORDER — INSULIN PMP CART,AUT,G6/7,CNTR
EACH SUBCUTANEOUS
COMMUNITY
Start: 2022-09-22

## 2022-12-05 RX ORDER — INSULIN LISPRO-AABC 100 [IU]/ML
INJECTION, SOLUTION INTRAVENOUS; SUBCUTANEOUS
COMMUNITY
Start: 2022-06-15

## 2022-12-05 RX ORDER — RIZATRIPTAN BENZOATE 10 MG/1
TABLET ORAL
COMMUNITY
Start: 2022-09-12

## 2022-12-05 RX ORDER — BLOOD-GLUCOSE TRANSMITTER
EACH MISCELLANEOUS
COMMUNITY
Start: 2022-07-12

## 2022-12-05 RX ORDER — INSULIN PMP CART,AUT,G6/7,CNTR
EACH SUBCUTANEOUS
COMMUNITY
Start: 2022-07-07

## 2022-12-05 NOTE — PROGRESS NOTES
Patient ID: Halle Spence is a 56 y.o. female.    Chief Complaint: Annual Exam    HPI Halle is a 56 y.o. female with type 1 diabetes, allergic rhinitis, vitamin-D insufficiency, hypothyroidism, and migraine headaches who presents for annual exam.  Endocrinology is managing her type 1 diabetes, vitamin-D insufficiency, and hypothyroidism.  Neurology has been managing her migraine headaches.  She was seeing ENT for allergy shots.  But she stopped doing the allergy shots when she realized they were a trigger for her migraine headaches.  She plans to see her endocrinologist against soon, in January.  At that time she will do blood work which includes vitamin-D level, A1c, lipid panel, and CMP.  Endocrinologist is considering starting cholesterol-lowering medication for patient if her cholesterol is not improved on next check.  Patient reports that endocrinologist does her foot exams.  No new acute complaints today.    Health Maintenance Topics with due status: Not Due       Topic Last Completion Date    Mammogram 02/14/2022    TETANUS VACCINE 02/25/2022    Lipid Panel 03/14/2022      Review of Systems   All other systems reviewed and are negative.      Objective:     Vitals:    12/05/22 1255   BP: 94/68   Pulse: 82        Physical Exam  Vitals reviewed.   Constitutional:       General: She is not in acute distress.     Appearance: Normal appearance. She is well-developed. She is not ill-appearing, toxic-appearing or diaphoretic.   HENT:      Head: Normocephalic and atraumatic.      Right Ear: Tympanic membrane, ear canal and external ear normal. There is no impacted cerumen.      Left Ear: Tympanic membrane, ear canal and external ear normal. There is no impacted cerumen.      Nose: Nose normal.   Eyes:      General: No scleral icterus.        Right eye: No discharge.         Left eye: No discharge.      Extraocular Movements: Extraocular movements intact.      Conjunctiva/sclera: Conjunctivae normal.   Neck:       Thyroid: No thyromegaly.      Trachea: No tracheal deviation.   Cardiovascular:      Rate and Rhythm: Normal rate and regular rhythm.      Pulses: Normal pulses.      Heart sounds: Normal heart sounds. No murmur heard.    No friction rub. No gallop.   Pulmonary:      Effort: Pulmonary effort is normal. No respiratory distress.      Breath sounds: Normal breath sounds. No stridor. No wheezing, rhonchi or rales.   Chest:      Chest wall: No tenderness.   Abdominal:      General: Bowel sounds are normal. There is no distension.      Palpations: Abdomen is soft. There is no mass.      Tenderness: There is no abdominal tenderness. There is no guarding or rebound.      Hernia: No hernia is present.   Musculoskeletal:         General: No tenderness or deformity.      Cervical back: Neck supple.      Right lower leg: No edema.      Left lower leg: No edema.   Lymphadenopathy:      Cervical: No cervical adenopathy.   Skin:     General: Skin is warm and dry.      Findings: No erythema or rash.   Neurological:      General: No focal deficit present.      Mental Status: She is alert and oriented to person, place, and time. Mental status is at baseline.   Psychiatric:         Mood and Affect: Mood normal.         Behavior: Behavior normal.         Thought Content: Thought content normal.         Judgment: Judgment normal.       Assessment:       1. Annual physical exam    2. Need for influenza vaccination    3. Encounter for screening colonoscopy    4. Type 1 diabetes mellitus with complications Chronic   5. Hypothyroidism due to Hashimoto's thyroiditis Chronic   6. Migraine without status migrainosus, not intractable, unspecified migraine type Chronic   7. Vitamin D insufficiency Chronic         Plan:         Annual physical exam  Comments:  She will have upcoming lab results faxed to me    Need for influenza vaccination  -     Influenza - Quadrivalent *Preferred* (6 months+) (PF)    Encounter for screening colonoscopy  -      Case Request Endoscopy: COLONOSCOPY    Type 1 diabetes mellitus with complications  Comments:  Managed by endocrinology     Hypothyroidism due to Hashimoto's thyroiditis  Comments:  Managed by endocrinology     Migraine without status migrainosus, not intractable, unspecified migraine type  Comments:  Managed by neurology    Vitamin D insufficiency  Comments:  Managed by endocrinology       RTC 1 year and PRN      Warning signs discussed, patient to call with any further issues or worsening of symptoms.       Parts of the above note were dictated using a voice dictation software. Please excuse any grammatical or typographical errors.

## 2022-12-15 ENCOUNTER — PATIENT MESSAGE (OUTPATIENT)
Dept: ADMINISTRATIVE | Facility: HOSPITAL | Age: 57
End: 2022-12-15
Payer: COMMERCIAL

## 2022-12-21 ENCOUNTER — TELEPHONE (OUTPATIENT)
Dept: ENDOSCOPY | Facility: HOSPITAL | Age: 57
End: 2022-12-21
Payer: COMMERCIAL

## 2022-12-21 NOTE — TELEPHONE ENCOUNTER
Attempted to contact Halle Spence  to schedule procedure , no answer, left message with call back number

## 2023-01-17 ENCOUNTER — PATIENT MESSAGE (OUTPATIENT)
Dept: ADMINISTRATIVE | Facility: HOSPITAL | Age: 58
End: 2023-01-17
Payer: COMMERCIAL

## 2023-01-19 ENCOUNTER — OFFICE VISIT (OUTPATIENT)
Dept: URGENT CARE | Facility: CLINIC | Age: 58
End: 2023-01-19
Payer: COMMERCIAL

## 2023-01-19 VITALS
RESPIRATION RATE: 18 BRPM | BODY MASS INDEX: 24.46 KG/M2 | TEMPERATURE: 99 F | OXYGEN SATURATION: 98 % | DIASTOLIC BLOOD PRESSURE: 89 MMHG | HEART RATE: 72 BPM | SYSTOLIC BLOOD PRESSURE: 141 MMHG | WEIGHT: 147 LBS

## 2023-01-19 DIAGNOSIS — J02.9 SORE THROAT: Primary | ICD-10-CM

## 2023-01-19 DIAGNOSIS — H92.02 EAR DISCOMFORT, LEFT: ICD-10-CM

## 2023-01-19 DIAGNOSIS — R05.9 COUGH, UNSPECIFIED TYPE: ICD-10-CM

## 2023-01-19 LAB
CTP QC/QA: YES
SARS-COV-2 AG RESP QL IA.RAPID: NEGATIVE

## 2023-01-19 PROCEDURE — 99213 OFFICE O/P EST LOW 20 MIN: CPT | Mod: S$GLB,,,

## 2023-01-19 PROCEDURE — 1160F PR REVIEW ALL MEDS BY PRESCRIBER/CLIN PHARMACIST DOCUMENTED: ICD-10-PCS | Mod: CPTII,S$GLB,,

## 2023-01-19 PROCEDURE — 3008F PR BODY MASS INDEX (BMI) DOCUMENTED: ICD-10-PCS | Mod: CPTII,S$GLB,,

## 2023-01-19 PROCEDURE — 3077F PR MOST RECENT SYSTOLIC BLOOD PRESSURE >= 140 MM HG: ICD-10-PCS | Mod: CPTII,S$GLB,,

## 2023-01-19 PROCEDURE — 3079F PR MOST RECENT DIASTOLIC BLOOD PRESSURE 80-89 MM HG: ICD-10-PCS | Mod: CPTII,S$GLB,,

## 2023-01-19 PROCEDURE — 3079F DIAST BP 80-89 MM HG: CPT | Mod: CPTII,S$GLB,,

## 2023-01-19 PROCEDURE — 3077F SYST BP >= 140 MM HG: CPT | Mod: CPTII,S$GLB,,

## 2023-01-19 PROCEDURE — 99213 PR OFFICE/OUTPT VISIT, EST, LEVL III, 20-29 MIN: ICD-10-PCS | Mod: S$GLB,,,

## 2023-01-19 PROCEDURE — 1159F MED LIST DOCD IN RCRD: CPT | Mod: CPTII,S$GLB,,

## 2023-01-19 PROCEDURE — 3008F BODY MASS INDEX DOCD: CPT | Mod: CPTII,S$GLB,,

## 2023-01-19 PROCEDURE — 1159F PR MEDICATION LIST DOCUMENTED IN MEDICAL RECORD: ICD-10-PCS | Mod: CPTII,S$GLB,,

## 2023-01-19 PROCEDURE — 1160F RVW MEDS BY RX/DR IN RCRD: CPT | Mod: CPTII,S$GLB,,

## 2023-01-19 PROCEDURE — 87811 SARS-COV-2 COVID19 W/OPTIC: CPT | Mod: QW,S$GLB,,

## 2023-01-19 PROCEDURE — 87811 SARS CORONAVIRUS 2 ANTIGEN POCT, MANUAL READ: ICD-10-PCS | Mod: QW,S$GLB,,

## 2023-01-19 RX ORDER — FLUTICASONE PROPIONATE 50 MCG
2 SPRAY, SUSPENSION (ML) NASAL DAILY
Qty: 9.9 ML | Refills: 0 | Status: SHIPPED | OUTPATIENT
Start: 2023-01-19 | End: 2023-02-17

## 2023-01-19 NOTE — PROGRESS NOTES
"Subjective:       Patient ID: Halle Spence is a 57 y.o. female.    Vitals:  weight is 66.7 kg (147 lb). Her temperature is 98.9 °F (37.2 °C). Her blood pressure is 141/89 (abnormal) and her pulse is 72. Her respiration is 18 and oxygen saturation is 98%.     Chief Complaint: Cough    Pt is complaining of left sided sore throat, left ear pain that started this morning.  States last weekend she felt a little groggy but resolved on its own.  Patient denies fever, known sick contacts and works from home.  She has not tried anything for relief. "Main concern was my throat was hurting." Denies trouble swallowing, shortness of breath, swollen glands, concerning cough, chest pain.  Allergic to benadryl.       URI   This is a new problem. The current episode started today. There has been no fever. Associated symptoms include ear pain (Left), neck pain and a sore throat (worse on left side). Pertinent negatives include no congestion, coughing ("just to clear my throat"), diarrhea, dysuria, headaches, joint pain, joint swelling, nausea, plugged ear sensation, rash, rhinorrhea, sinus pain, sneezing, swollen glands, vomiting or wheezing. She has tried nothing for the symptoms.     Constitution: Negative for chills and fever.   HENT:  Positive for ear pain (Left) and sore throat (worse on left side). Negative for ear discharge, congestion, sinus pain, sinus pressure and trouble swallowing.    Neck: Positive for neck pain.   Respiratory:  Negative for cough ("just to clear my throat"), sputum production and wheezing.    Gastrointestinal:  Negative for nausea, vomiting and diarrhea.   Genitourinary:  Negative for dysuria.   Skin:  Negative for rash.   Allergic/Immunologic: Negative for sneezing.   Neurological:  Negative for headaches.     Objective:      Vitals:    01/19/23 1123   BP: (!) 141/89   Pulse: 72   Resp: 18   Temp: 98.9 °F (37.2 °C)       Physical Exam   Constitutional: She is oriented to person, place, and time. " She appears well-developed. She is cooperative.  Non-toxic appearance. She does not appear ill. No distress.   HENT:   Head: Normocephalic and atraumatic.   Ears:   Right Ear: Hearing, tympanic membrane, external ear and ear canal normal. No no drainage. No mastoid tenderness. Tympanic membrane is not erythematous and not bulging. No middle ear effusion. impacted cerumen  Left Ear: Hearing, tympanic membrane, external ear and ear canal normal. No no drainage. No mastoid tenderness. Tympanic membrane is not erythematous and not bulging.  No middle ear effusion. impacted cerumen     Comments: Per patient small ear canals  Nose: Nose normal. No mucosal edema, rhinorrhea or nasal deformity. No epistaxis. Right sinus exhibits no maxillary sinus tenderness and no frontal sinus tenderness. Left sinus exhibits no maxillary sinus tenderness and no frontal sinus tenderness.   Mouth/Throat: Uvula is midline, oropharynx is clear and moist and mucous membranes are normal. Mucous membranes are moist. No trismus in the jaw. Normal dentition. No uvula swelling. Cobblestoning present. No oropharyngeal exudate, posterior oropharyngeal edema, posterior oropharyngeal erythema or tonsillar abscesses. No tonsillar exudate.   Eyes: Conjunctivae and lids are normal. No scleral icterus.   Neck: Trachea normal and phonation normal. Neck supple. No edema present. No erythema present. No neck rigidity present.   Cardiovascular: Normal rate, regular rhythm, normal heart sounds and normal pulses.   Pulmonary/Chest: Effort normal and breath sounds normal. No respiratory distress. She has no decreased breath sounds. She has no wheezes. She has no rhonchi. She has no rales.   Abdominal: Normal appearance.   Musculoskeletal: Normal range of motion.         General: No deformity. Normal range of motion.   Neurological: She is alert and oriented to person, place, and time. She exhibits normal muscle tone. Coordination normal.   Skin: Skin is warm,  dry, intact, not diaphoretic and not pale.   Psychiatric: Her speech is normal and behavior is normal. Judgment and thought content normal.   Nursing note and vitals reviewed.      Assessment:       1. Sore throat    2. Ear discomfort, left    3. Cough, unspecified type          Results for orders placed or performed in visit on 01/19/23   SARS Coronavirus 2 Antigen, POCT Manual Read   Result Value Ref Range    SARS Coronavirus 2 Antigen Negative Negative     Acceptable Yes        Plan:         Sore throat  -     SARS Coronavirus 2 Antigen, POCT Manual Read    Ear discomfort, left    Cough, unspecified type    Other orders  -     fluticasone propionate (FLONASE) 50 mcg/actuation nasal spray; 2 sprays (100 mcg total) by Each Nostril route once daily.  Dispense: 9.9 mL; Refill: 0              Patient Instructions   Ear Pain:  At this time you do not have an ear infection  Humidifier use at home.  Warm compresses to affected ear  Elevate head on a pillow at night   Flonase Nasal Spray as directed for nasal congestion  Over the Counter Claritin or Zyrtec (plain) as directed for allergy symptoms  Tylenol or Motrin every 4 - 6 hours as needed for fever or ear pain.  Follow up with your PCP in 1 week of initiating antibiotics or sooner for no improvement in symptoms  Follow up in the ER for any worsening of symptoms such as new fever, increasing ear pain, neck stiffness, shortness of breath, etc.  If you smoke, stop smoking.    SORE THROAT:    You may gargle with hot salt water 4 times a day for the next 2 days and then you may also gargle diluted hydrogen peroxide once to twice daily to alleviate some of your throat discomfort.  Drink plenty of fluids, recommend warm tea with honey.     YOU MAY USE OVER-THE-COUNTER CEPACOL FOR SOOTHING OF YOUR THROAT.  You may wish to avoid spicy food, citrus fruits, and red sauces- as this may irritate the throat more.    You can also take a daily anti-histamine such as  Zyrtec, Claritin, Xyzal, OR Allegra-IN DAYTIME; NON DROWSY) AND/OR Benadryl- AT NIGHT; DROWSY) to help with runny nose/sneezing/sore throat/cough.    If your symptoms do not improve, you should return to this clinic. If your symptoms worsen, go to the emergency room.       FLONASE AS DIRECTED--    How do you use a Nasal Spray?    Make sure you understand your dosing instructions. Spray only the number of prescribed sprays in each nostril. Read the package instructions before using your spray the first time.    Most sprays suggest the following steps:    Wash your hands well.    Gently blow your nose to clear the passageway.    Shake the container several times.    Tilt your head slightly downward.  Use the opposite hand from the nostril you will be spraying to hold the spray bottle.    Block one nostril with your finger.  Insert the nasal applicator into the other nostril.    Aim the spray toward the outer wall of the nostril.  Inhale slowly through the nose and press the .    Breathe out and repeat to apply the prescribed number of sprays.  Repeat these steps for the other nostril.     Avoid sneezing or blowing your nose right after spraying.

## 2023-03-22 ENCOUNTER — OFFICE VISIT (OUTPATIENT)
Dept: FAMILY MEDICINE | Facility: CLINIC | Age: 58
End: 2023-03-22
Payer: COMMERCIAL

## 2023-03-22 VITALS
BODY MASS INDEX: 26.93 KG/M2 | WEIGHT: 161.63 LBS | DIASTOLIC BLOOD PRESSURE: 68 MMHG | SYSTOLIC BLOOD PRESSURE: 130 MMHG | HEIGHT: 65 IN | HEART RATE: 66 BPM | OXYGEN SATURATION: 98 %

## 2023-03-22 DIAGNOSIS — E11.9 TYPE 2 DIABETES MELLITUS WITHOUT COMPLICATION: ICD-10-CM

## 2023-03-22 DIAGNOSIS — M75.02 ADHESIVE CAPSULITIS OF LEFT SHOULDER: Primary | ICD-10-CM

## 2023-03-22 PROCEDURE — 99214 PR OFFICE/OUTPT VISIT, EST, LEVL IV, 30-39 MIN: ICD-10-PCS | Mod: S$GLB,,,

## 2023-03-22 PROCEDURE — 3075F SYST BP GE 130 - 139MM HG: CPT | Mod: CPTII,S$GLB,,

## 2023-03-22 PROCEDURE — 3075F PR MOST RECENT SYSTOLIC BLOOD PRESS GE 130-139MM HG: ICD-10-PCS | Mod: CPTII,S$GLB,,

## 2023-03-22 PROCEDURE — 99999 PR PBB SHADOW E&M-EST. PATIENT-LVL V: ICD-10-PCS | Mod: PBBFAC,,,

## 2023-03-22 PROCEDURE — 1159F MED LIST DOCD IN RCRD: CPT | Mod: CPTII,S$GLB,,

## 2023-03-22 PROCEDURE — 1159F PR MEDICATION LIST DOCUMENTED IN MEDICAL RECORD: ICD-10-PCS | Mod: CPTII,S$GLB,,

## 2023-03-22 PROCEDURE — 3078F DIAST BP <80 MM HG: CPT | Mod: CPTII,S$GLB,,

## 2023-03-22 PROCEDURE — 99999 PR PBB SHADOW E&M-EST. PATIENT-LVL V: CPT | Mod: PBBFAC,,,

## 2023-03-22 PROCEDURE — 1160F RVW MEDS BY RX/DR IN RCRD: CPT | Mod: CPTII,S$GLB,,

## 2023-03-22 PROCEDURE — 3078F PR MOST RECENT DIASTOLIC BLOOD PRESSURE < 80 MM HG: ICD-10-PCS | Mod: CPTII,S$GLB,,

## 2023-03-22 PROCEDURE — 99214 OFFICE O/P EST MOD 30 MIN: CPT | Mod: S$GLB,,,

## 2023-03-22 PROCEDURE — 1160F PR REVIEW ALL MEDS BY PRESCRIBER/CLIN PHARMACIST DOCUMENTED: ICD-10-PCS | Mod: CPTII,S$GLB,,

## 2023-03-22 PROCEDURE — 3008F PR BODY MASS INDEX (BMI) DOCUMENTED: ICD-10-PCS | Mod: CPTII,S$GLB,,

## 2023-03-22 PROCEDURE — 3008F BODY MASS INDEX DOCD: CPT | Mod: CPTII,S$GLB,,

## 2023-03-22 RX ORDER — MELOXICAM 7.5 MG/1
7.5 TABLET ORAL DAILY
Qty: 30 TABLET | Refills: 0 | Status: SHIPPED | OUTPATIENT
Start: 2023-03-22 | End: 2023-05-18

## 2023-03-22 RX ORDER — CYCLOBENZAPRINE HCL 5 MG
5 TABLET ORAL 3 TIMES DAILY PRN
Qty: 30 TABLET | Refills: 0 | Status: SHIPPED | OUTPATIENT
Start: 2023-03-22 | End: 2023-04-01

## 2023-03-22 NOTE — PROGRESS NOTES
Subjective:         Chief Complaint: Numbness, Shoulder Pain, and Tingling     Halle Spence is a 57 y.o. female, patient of Vilma Milan MD    unknown to me, presents today with complaints of Numbness, Shoulder Pain, and Tingling    Shoulder Pain   The pain is present in the left shoulder. This is a new problem. The current episode started more than 1 month ago. There has been no history of extremity trauma. The problem occurs every several days. The problem has been waxing and waning. The quality of the pain is described as dull. The pain is at a severity of 0/10. Associated symptoms include numbness and tingling. Pertinent negatives include no fever, headaches, inability to bear weight, itching, joint locking, limited range of motion, stiffness or visual symptoms. Her past medical history is significant for diabetes. There is no history of Injuries to Extremity or migraines.     Past Medical History:   Diagnosis Date    Allergic rhinitis     Diabetes mellitus type 1     Hypothyroidism due to Hashimoto's thyroiditis     Pes planus        Past Surgical History:   Procedure Laterality Date    APPENDECTOMY      FOOT SURGERY      HAND SURGERY      HYSTERECTOMY      OOPHORECTOMY         Family History   Problem Relation Age of Onset    Atrial fibrillation Mother     Heart disease Mother     Diabetes Father     Diabetes Maternal Grandfather     Diabetes Paternal Grandfather        Social History     Socioeconomic History    Marital status:    Tobacco Use    Smoking status: Never    Smokeless tobacco: Never   Substance and Sexual Activity    Alcohol use: Yes     Comment: occasional wine       Review of Systems   Constitutional:  Negative for appetite change, chills, fever and unexpected weight change.   Eyes:  Negative for visual disturbance.   Respiratory:  Negative for chest tightness and shortness of breath.    Cardiovascular:  Negative for chest pain and palpitations.   Gastrointestinal:  Negative  "for abdominal pain.   Endocrine: Negative for polydipsia, polyphagia and polyuria.   Genitourinary: Negative.    Musculoskeletal:  Positive for arthralgias and neck pain. Negative for back pain, joint swelling, neck stiffness and stiffness.   Skin:  Negative for color change and itching.   Neurological:  Positive for tingling and numbness. Negative for dizziness, weakness and headaches.   Hematological:  Negative for adenopathy.   All other systems reviewed and are negative.      Objective:     Vitals:    03/22/23 1423   BP: 130/68   BP Location: Right arm   Patient Position: Sitting   BP Method: Medium (Manual)   Pulse: 66   SpO2: 98%   Weight: 73.3 kg (161 lb 9.6 oz)   Height: 5' 5" (1.651 m)          Physical Exam  Vitals reviewed.   Constitutional:       General: She is not in acute distress.     Appearance: Normal appearance. She is well-developed and well-groomed. She is not ill-appearing or toxic-appearing.   Eyes:      Pupils: Pupils are equal, round, and reactive to light.   Neck:      Vascular: No carotid bruit.   Cardiovascular:      Pulses: Normal pulses.      Heart sounds: No murmur heard.  Pulmonary:      Effort: Pulmonary effort is normal. No respiratory distress.   Musculoskeletal:      Right shoulder: Normal. No tenderness. Normal range of motion. Normal strength. Normal pulse.      Left shoulder: Normal. No tenderness. Normal range of motion. Normal strength. Normal pulse.      Cervical back: Normal range of motion and neck supple. Spasms present. No swelling, erythema, rigidity, tenderness, bony tenderness or crepitus.      Right lower leg: No edema.      Left lower leg: No edema.      Comments: Negative shoulder drop arm test  Negative empty can test  No joint swelling, tenderness, erythema, deformity or bony abnormalities noted.   BUE and neck ROM normal  Shoulder strength normal   Lymphadenopathy:      Cervical: No cervical adenopathy.   Skin:     General: Skin is warm and dry.      Capillary " Refill: Capillary refill takes less than 2 seconds.   Neurological:      General: No focal deficit present.      Mental Status: She is alert and oriented to person, place, and time.   Psychiatric:         Attention and Perception: Attention normal.         Mood and Affect: Mood normal.         Speech: Speech normal.         Behavior: Behavior is cooperative.             Assessment:         ICD-10-CM ICD-9-CM   1. Adhesive capsulitis of left shoulder  M75.02 726.0       Plan:       Adhesive capsulitis of left shoulder  -     meloxicam (MOBIC) 7.5 MG tablet; Take 1 tablet (7.5 mg total) by mouth once daily.  Dispense: 30 tablet; Refill: 0  -     cyclobenzaprine (FLEXERIL) 5 MG tablet; Take 1 tablet (5 mg total) by mouth 3 (three) times daily as needed for Muscle spasms.  Dispense: 30 tablet; Refill: 0  -     X-Ray Cervical Spine AP And Lateral; Future; Expected date: 03/22/2023  -     X-Ray Shoulder 2 or More Views Left; Future; Expected date: 03/22/2023    -Imaging ordered.  -Patient counseled on medication mechanism of action, side effects, benefits, and risks. Patient verbalizes understanding.    -Discussed at length the importance of daily stretching exercises, including shoulder roll, scapular pinch and neck stretches  -Advised patient to avoid extreme ROM movements , and prolonged periods in one position.  -Red flags discussed with patient in detail. Patient voiced understanding and in agreement with current treatment plan.  -Return to clinic if symptoms do not improve in 6-8 weeks, or sooner if symptoms worsen or fail to improve.    I counseled the patient on her conditions, their implications and medical management.       If symptoms worsen, go to ER.  If symptoms do not improve, return to clinic.   Keep appointments with all specialists.   Follow up if symptoms worsen or fail to improve.     Patient's Medications   New Prescriptions    CYCLOBENZAPRINE (FLEXERIL) 5 MG TABLET    Take 1 tablet (5 mg total) by  mouth 3 (three) times daily as needed for Muscle spasms.    MELOXICAM (MOBIC) 7.5 MG TABLET    Take 1 tablet (7.5 mg total) by mouth once daily.   Previous Medications    AMITRIPTYLINE (ELAVIL) 10 MG TABLET    Take 10 mg by mouth every evening.    BLOOD-GLUCOSE SENSOR (DEXCOM G6 SENSOR) LACIE        BLOOD-GLUCOSE TRANSMITTER (DEXCOM G6 TRANSMITTER) LACIE        CONTOUR NEXT TEST STRIPS STRP    USE TO TEST 5 TIME PER DAY    DEXCOM G6 SENSOR LACIE        FLUTICASONE PROPIONATE (FLONASE) 50 MCG/ACTUATION NASAL SPRAY    SHAKE LIQUID AND USE 2 SPRAYS(100 MCG) IN EACH NOSTRIL EVERY DAY    INSULIN LISPRO-AABC (LYUMJEV U-100 INSULIN) 100 UNIT/ML    INJECT 100 UNITS INTO PUMP DAILY    LEVOTHYROXINE (SYNTHROID) 88 MCG TABLET    Take 88 mcg by mouth once daily.     OMNIPOD 5 G6 INTRO KIT, GEN 5, CRTG    use as directed    OMNIPOD 5 G6 PODS, GEN 5, CRTG    CHANGE EACH POD EVERY 3 DAYS    ONDANSETRON (ZOFRAN-ODT) 4 MG TBDL    Place 4 mg under the tongue every 8 (eight) hours as needed.    RIZATRIPTAN (MAXALT) 10 MG TABLET    TAKE 1 TABLET BY MOUTH DAILY AS NEEDED FOR MIGRAINE. MAY REPEAT IN 2 HOURS AS NEEDED   Modified Medications    No medications on file   Discontinued Medications    No medications on file       Meredith Chavarria NP

## 2023-03-24 ENCOUNTER — HOSPITAL ENCOUNTER (OUTPATIENT)
Dept: RADIOLOGY | Facility: HOSPITAL | Age: 58
Discharge: HOME | End: 2023-03-24
Payer: COMMERCIAL

## 2023-03-24 DIAGNOSIS — M75.02 ADHESIVE CAPSULITIS OF LEFT SHOULDER: ICD-10-CM

## 2023-03-24 PROCEDURE — 73030 X-RAY EXAM OF SHOULDER: CPT | Mod: 26,LT,, | Performed by: RADIOLOGY

## 2023-03-24 PROCEDURE — 72040 XR CERVICAL SPINE AP LATERAL: ICD-10-PCS | Mod: 26,,, | Performed by: RADIOLOGY

## 2023-03-24 PROCEDURE — 72040 X-RAY EXAM NECK SPINE 2-3 VW: CPT | Mod: 26,,, | Performed by: RADIOLOGY

## 2023-03-24 PROCEDURE — 73030 XR SHOULDER COMPLETE 2 OR MORE VIEWS LEFT: ICD-10-PCS | Mod: 26,LT,, | Performed by: RADIOLOGY

## 2023-03-24 PROCEDURE — 73030 X-RAY EXAM OF SHOULDER: CPT | Mod: TC,FY,LT

## 2023-03-24 PROCEDURE — 72040 X-RAY EXAM NECK SPINE 2-3 VW: CPT | Mod: TC,FY

## 2023-03-27 ENCOUNTER — PATIENT MESSAGE (OUTPATIENT)
Dept: ADMINISTRATIVE | Facility: HOSPITAL | Age: 58
End: 2023-03-27
Payer: COMMERCIAL

## 2023-04-11 ENCOUNTER — PATIENT MESSAGE (OUTPATIENT)
Dept: ADMINISTRATIVE | Facility: HOSPITAL | Age: 58
End: 2023-04-11
Payer: COMMERCIAL

## 2023-05-24 ENCOUNTER — PATIENT OUTREACH (OUTPATIENT)
Dept: ADMINISTRATIVE | Facility: HOSPITAL | Age: 58
End: 2023-05-24
Payer: COMMERCIAL

## 2023-05-24 NOTE — PROGRESS NOTES
Working DM Eye Report:     Patient overdue for DM eye exam.  Requested eye exam from Dr. Justin Christianson.

## 2023-06-18 DIAGNOSIS — M75.02 ADHESIVE CAPSULITIS OF LEFT SHOULDER: ICD-10-CM

## 2023-06-19 RX ORDER — MELOXICAM 7.5 MG/1
7.5 TABLET ORAL DAILY PRN
Qty: 30 TABLET | Refills: 0 | Status: SHIPPED | OUTPATIENT
Start: 2023-06-19 | End: 2023-07-24

## 2023-06-21 ENCOUNTER — PATIENT MESSAGE (OUTPATIENT)
Dept: ADMINISTRATIVE | Facility: HOSPITAL | Age: 58
End: 2023-06-21
Payer: COMMERCIAL

## 2023-07-10 ENCOUNTER — PATIENT MESSAGE (OUTPATIENT)
Dept: ADMINISTRATIVE | Facility: HOSPITAL | Age: 58
End: 2023-07-10
Payer: COMMERCIAL

## 2023-07-24 DIAGNOSIS — M75.02 ADHESIVE CAPSULITIS OF LEFT SHOULDER: ICD-10-CM

## 2023-07-24 RX ORDER — MELOXICAM 7.5 MG/1
TABLET ORAL
Qty: 30 TABLET | Refills: 0 | Status: SHIPPED | OUTPATIENT
Start: 2023-07-24

## 2023-07-26 ENCOUNTER — PATIENT OUTREACH (OUTPATIENT)
Dept: ADMINISTRATIVE | Facility: HOSPITAL | Age: 58
End: 2023-07-26
Payer: COMMERCIAL

## 2023-08-16 ENCOUNTER — PATIENT MESSAGE (OUTPATIENT)
Dept: ADMINISTRATIVE | Facility: HOSPITAL | Age: 58
End: 2023-08-16
Payer: COMMERCIAL

## 2023-10-20 ENCOUNTER — HOSPITAL ENCOUNTER (OUTPATIENT)
Dept: RADIOLOGY | Facility: HOSPITAL | Age: 58
Discharge: HOME | End: 2023-10-20
Attending: INTERNAL MEDICINE
Payer: COMMERCIAL

## 2023-10-20 DIAGNOSIS — Z12.31 ENCOUNTER FOR SCREENING MAMMOGRAM FOR BREAST CANCER: ICD-10-CM

## 2023-10-20 PROCEDURE — 77063 MAMMO DIGITAL SCREENING BILAT WITH TOMO: ICD-10-PCS | Mod: 26,,, | Performed by: RADIOLOGY

## 2023-10-20 PROCEDURE — 77067 MAMMO DIGITAL SCREENING BILAT WITH TOMO: ICD-10-PCS | Mod: 26,,, | Performed by: RADIOLOGY

## 2023-10-20 PROCEDURE — 77067 SCR MAMMO BI INCL CAD: CPT | Mod: 26,,, | Performed by: RADIOLOGY

## 2023-10-20 PROCEDURE — 77063 BREAST TOMOSYNTHESIS BI: CPT | Mod: 26,,, | Performed by: RADIOLOGY

## 2023-10-20 PROCEDURE — 77067 SCR MAMMO BI INCL CAD: CPT | Mod: TC

## 2023-10-27 ENCOUNTER — TELEPHONE (OUTPATIENT)
Dept: INTERNAL MEDICINE | Facility: CLINIC | Age: 58
End: 2023-10-27
Payer: COMMERCIAL

## 2023-12-05 ENCOUNTER — TELEPHONE (OUTPATIENT)
Dept: GASTROENTEROLOGY | Facility: CLINIC | Age: 58
End: 2023-12-05
Payer: COMMERCIAL

## 2023-12-05 ENCOUNTER — OFFICE VISIT (OUTPATIENT)
Dept: INTERNAL MEDICINE | Facility: CLINIC | Age: 58
End: 2023-12-05
Payer: COMMERCIAL

## 2023-12-05 VITALS
DIASTOLIC BLOOD PRESSURE: 66 MMHG | WEIGHT: 160.69 LBS | BODY MASS INDEX: 26.77 KG/M2 | HEIGHT: 65 IN | HEART RATE: 73 BPM | OXYGEN SATURATION: 98 % | SYSTOLIC BLOOD PRESSURE: 122 MMHG

## 2023-12-05 DIAGNOSIS — E06.3 HYPOTHYROIDISM DUE TO HASHIMOTO'S THYROIDITIS: Chronic | ICD-10-CM

## 2023-12-05 DIAGNOSIS — G43.E09 CHRONIC MIGRAINE WITH AURA WITHOUT STATUS MIGRAINOSUS, NOT INTRACTABLE: ICD-10-CM

## 2023-12-05 DIAGNOSIS — Z12.11 ENCOUNTER FOR SCREENING COLONOSCOPY: ICD-10-CM

## 2023-12-05 DIAGNOSIS — E03.8 HYPOTHYROIDISM DUE TO HASHIMOTO'S THYROIDITIS: Chronic | ICD-10-CM

## 2023-12-05 DIAGNOSIS — E10.8 TYPE 1 DIABETES MELLITUS WITH COMPLICATIONS: Chronic | ICD-10-CM

## 2023-12-05 DIAGNOSIS — E55.9 VITAMIN D INSUFFICIENCY: ICD-10-CM

## 2023-12-05 DIAGNOSIS — Z00.00 ANNUAL PHYSICAL EXAM: ICD-10-CM

## 2023-12-05 PROCEDURE — 3074F SYST BP LT 130 MM HG: CPT | Mod: CPTII,S$GLB,, | Performed by: INTERNAL MEDICINE

## 2023-12-05 PROCEDURE — 3008F PR BODY MASS INDEX (BMI) DOCUMENTED: ICD-10-PCS | Mod: CPTII,S$GLB,, | Performed by: INTERNAL MEDICINE

## 2023-12-05 PROCEDURE — 3074F PR MOST RECENT SYSTOLIC BLOOD PRESSURE < 130 MM HG: ICD-10-PCS | Mod: CPTII,S$GLB,, | Performed by: INTERNAL MEDICINE

## 2023-12-05 PROCEDURE — 3078F DIAST BP <80 MM HG: CPT | Mod: CPTII,S$GLB,, | Performed by: INTERNAL MEDICINE

## 2023-12-05 PROCEDURE — 1159F MED LIST DOCD IN RCRD: CPT | Mod: CPTII,S$GLB,, | Performed by: INTERNAL MEDICINE

## 2023-12-05 PROCEDURE — 99396 PR PREVENTIVE VISIT,EST,40-64: ICD-10-PCS | Mod: S$GLB,,, | Performed by: INTERNAL MEDICINE

## 2023-12-05 PROCEDURE — 1160F PR REVIEW ALL MEDS BY PRESCRIBER/CLIN PHARMACIST DOCUMENTED: ICD-10-PCS | Mod: CPTII,S$GLB,, | Performed by: INTERNAL MEDICINE

## 2023-12-05 PROCEDURE — 3008F BODY MASS INDEX DOCD: CPT | Mod: CPTII,S$GLB,, | Performed by: INTERNAL MEDICINE

## 2023-12-05 PROCEDURE — 3078F PR MOST RECENT DIASTOLIC BLOOD PRESSURE < 80 MM HG: ICD-10-PCS | Mod: CPTII,S$GLB,, | Performed by: INTERNAL MEDICINE

## 2023-12-05 PROCEDURE — 99999 PR PBB SHADOW E&M-EST. PATIENT-LVL V: CPT | Mod: PBBFAC,,, | Performed by: INTERNAL MEDICINE

## 2023-12-05 PROCEDURE — 99396 PREV VISIT EST AGE 40-64: CPT | Mod: S$GLB,,, | Performed by: INTERNAL MEDICINE

## 2023-12-05 PROCEDURE — 1159F PR MEDICATION LIST DOCUMENTED IN MEDICAL RECORD: ICD-10-PCS | Mod: CPTII,S$GLB,, | Performed by: INTERNAL MEDICINE

## 2023-12-05 PROCEDURE — 99999 PR PBB SHADOW E&M-EST. PATIENT-LVL V: ICD-10-PCS | Mod: PBBFAC,,, | Performed by: INTERNAL MEDICINE

## 2023-12-05 PROCEDURE — 1160F RVW MEDS BY RX/DR IN RCRD: CPT | Mod: CPTII,S$GLB,, | Performed by: INTERNAL MEDICINE

## 2023-12-05 RX ORDER — BUTALBITAL, ACETAMINOPHEN AND CAFFEINE 50; 325; 40 MG/1; MG/1; MG/1
1 TABLET ORAL DAILY PRN
COMMUNITY

## 2023-12-05 NOTE — PROGRESS NOTES
Patient ID: Halle Spence is a 57 y.o. female.    Chief Complaint: Annual Exam    HPI Halle is a 57 y.o. female with type 1 diabetes, allergic rhinitis, vitamin-D insufficiency, hypothyroidism, and migraine headaches who presents for annual exam. No new acute complaints. She follows with endocrinologist Dr. Donaldson who is managing her type I diabetes, vitamin D insufficiency and hypothyroidism. Recently had labs done in October and A1c was 6.9. I was able to view her lab results from May 2023 in the chart. She follows with neurologist Dr. Mantilla for migraine headaches. Recently had migraine HA on Sunday after drinking red wine.     Health Maintenance Topics with due status: Not Due       Topic Last Completion Date    TETANUS VACCINE 02/25/2022    Lipid Panel 03/14/2022    Mammogram 10/20/2023        Review of Systems   All other systems reviewed and are negative.     Objective:     Vitals:    12/05/23 1050   BP: 122/66   Pulse: 73        Physical Exam  Vitals reviewed.   Constitutional:       General: She is not in acute distress.     Appearance: Normal appearance. She is well-developed. She is not ill-appearing, toxic-appearing or diaphoretic.   HENT:      Head: Normocephalic and atraumatic.      Right Ear: Tympanic membrane, ear canal and external ear normal. There is no impacted cerumen.      Left Ear: Tympanic membrane, ear canal and external ear normal. There is no impacted cerumen.      Nose: Nose normal.      Mouth/Throat:      Pharynx: No oropharyngeal exudate or posterior oropharyngeal erythema.   Eyes:      General: No scleral icterus.        Right eye: No discharge.         Left eye: No discharge.      Extraocular Movements: Extraocular movements intact.      Conjunctiva/sclera: Conjunctivae normal.   Cardiovascular:      Rate and Rhythm: Normal rate and regular rhythm.      Heart sounds: Normal heart sounds. No murmur heard.     No friction rub. No gallop.   Pulmonary:      Effort: Pulmonary  effort is normal. No respiratory distress.      Breath sounds: Normal breath sounds. No stridor. No wheezing, rhonchi or rales.   Abdominal:      General: Abdomen is flat. Bowel sounds are normal. There is no distension.      Palpations: Abdomen is soft. There is no mass.      Tenderness: There is no abdominal tenderness. There is no guarding or rebound.      Hernia: No hernia is present.   Musculoskeletal:      Right lower leg: No edema.      Left lower leg: No edema.   Skin:     General: Skin is warm and dry.   Neurological:      General: No focal deficit present.      Mental Status: She is alert and oriented to person, place, and time. Mental status is at baseline.   Psychiatric:         Mood and Affect: Mood normal.         Behavior: Behavior normal.         Thought Content: Thought content normal.         Judgment: Judgment normal.         Assessment:       1. Annual physical exam    2. Type 1 diabetes mellitus with complications Well controlled   3. Hypothyroidism due to Hashimoto's thyroiditis Well controlled   4. Vitamin D insufficiency Chronic   5. Chronic migraine with aura without status migrainosus, not intractable Chronic   6. Encounter for screening colonoscopy        Plan:         Annual physical exam    Type 1 diabetes mellitus with complications  Comments:  Continue to follow with endocrinology    Hypothyroidism due to Hashimoto's thyroiditis  Comments:  Continue to follow with endocrinology    Vitamin D insufficiency  Comments:  Continue to follow with endocrinology    Chronic migraine with aura without status migrainosus, not intractable  Comments:  Continue to follow with neurology    Encounter for screening colonoscopy  -     Ambulatory referral/consult to Endo Procedure ; Future; Expected date: 12/06/2023        RTC 1 year and PRN      Warning signs discussed, patient to call with any further issues or worsening of symptoms.       Parts of the above note were dictated using a voice  dictation software. Please excuse any grammatical or typographical errors.

## 2023-12-06 DIAGNOSIS — E10.8 TYPE 1 DIABETES MELLITUS WITH COMPLICATIONS: ICD-10-CM

## 2024-01-10 ENCOUNTER — PATIENT MESSAGE (OUTPATIENT)
Dept: ADMINISTRATIVE | Facility: HOSPITAL | Age: 59
End: 2024-01-10
Payer: COMMERCIAL

## 2024-01-10 DIAGNOSIS — E11.9 TYPE 2 DIABETES MELLITUS WITHOUT COMPLICATION: ICD-10-CM

## 2024-01-19 ENCOUNTER — CLINICAL SUPPORT (OUTPATIENT)
Dept: ENDOSCOPY | Facility: HOSPITAL | Age: 59
End: 2024-01-19
Attending: INTERNAL MEDICINE
Payer: COMMERCIAL

## 2024-01-19 DIAGNOSIS — Z12.11 ENCOUNTER FOR SCREENING COLONOSCOPY: ICD-10-CM

## 2024-01-19 NOTE — PLAN OF CARE
Pt. Wants procedure done in [place]. Phone number to schedule for [facility] provided to pt. Order is in for [place] location. Pt. Verbalized understanding to call to schedule.

## 2024-01-24 DIAGNOSIS — E11.9 TYPE 2 DIABETES MELLITUS WITHOUT COMPLICATION: ICD-10-CM

## 2024-02-19 LAB — HBA1C MFR BLD: 7 % (ref 4–6)

## 2024-02-22 ENCOUNTER — PATIENT MESSAGE (OUTPATIENT)
Dept: ADMINISTRATIVE | Facility: HOSPITAL | Age: 59
End: 2024-02-22
Payer: COMMERCIAL

## 2024-02-28 ENCOUNTER — TELEPHONE (OUTPATIENT)
Dept: GASTROENTEROLOGY | Facility: CLINIC | Age: 59
End: 2024-02-28
Payer: COMMERCIAL

## 2024-02-28 RX ORDER — SODIUM, POTASSIUM,MAG SULFATES 17.5-3.13G
1 SOLUTION, RECONSTITUTED, ORAL ORAL DAILY
Qty: 1 KIT | Refills: 0 | Status: SHIPPED | OUTPATIENT
Start: 2024-02-28 | End: 2024-03-01

## 2024-02-28 NOTE — TELEPHONE ENCOUNTER
Endoscopy Scheduling Questionnaire:         Are you taking any blood thinners? no               If Yes  Have you been on them for longer than one year? N/a    2. Have you been diagnosed with Diverticulitis in past three months?  no    3. Are you on dialysis or have Kidney Disease? no    4. Previous Colonoscopy?  no         If yes Do you have a history of colon polyps?  N/a    5. Family History of Colon Cancer: no         Relation: n/a       Age at Diagnosis: n/a      6. Are you a diabetic?  yes    7. Do you have a history of constipation?  no    8. Are you taking a GLP-1 Agonist/Adipex (weight loss drugs)? no  Dulaglutide (Trulicity) (weekly)  Exenatide extended release (Bydureon bcise) (weekly)  Exenatide (Byetta) (twice daily)  Semaglutide (Ozempic) (weekly)  Liraglutide (Victoza, Saxenda) (daily)  Lixisenatide (Adlyxin) (daily)  Semaglutide (Rybelsus) (taken by mouth once daily)    Hold GLP-1 agonists on the day of the procedure/surgery for patients who take the medication daily.    Hold GLP-1 agonists a week prior to the procedure/surgery for patients who take the medication weekly.    Procedure scheduled with Dr. Clancy  on  4/26/2024    The prep being used is Suprep     The patient's prep instructions were sent via patient portal.        SUPREP Instructions    [facility] [address]  Clinic Office [phone]  Endoscopy Lab [phone]    You are scheduled for a Colonoscopy with Dr. Newberry  on 4/26/2024 at [facility] in [place].    Check in at the Hospital -1st floor, Information desk.   Call [phone] to reschedule.    An adult friend/family member must come with you to drive you home.  You cannot drive, take a taxi, Uber/Lyft or bus to leave the Endoscopy Center alone.  If you do not have someone to drive you home, your test will be cancelled.     Please follow the directions of your doctor if you take any pills that thin your blood. If you take these meds: Aggrenox,  Brilinta, Effient, Eliquis, Lovenox, Plavix, Pletal, Pradaxa, Ticilid, Xarelto or Coumadin, let the doctor's office know.    Please hold any GLP-1 medications prior to the procedure: Dulaglutide Trulicity(hold week prior), Exenatide Byetta (hold the morning of procedure), Semaglutide Ozempic (hold week prior), Liraglutide Victoza, Saxenda(hold week prior), Lixisenatide Adlyxin (hold the morning of procedure), Semaglutide Rybelsus (hold the morning of procedure), Tirzepatide Mounjaro (hold week prior)     DON'T: On the morning of the test do not take insulin or pills for diabetes.     DO: On the morning of the test, do take any pills for blood pressure, heart, anti-rejection and or seizures with a small sip of water. Bring any inhalers with you.    To have a good prep, you must follow these instructions - please do not use the directions from the pharmacy.    The doctor will send a prescription for the SUPREP.      The Day Before the test:    You can only drink CLEAR LIQUIDS the whole day before your test.  You can't eat any food for the whole day.    You CAN have:  Water, Coffee or decaf coffee (no milk or cream)  Tea  Soft drinks - regular and sugar free  Jello (green or yellow)  Apple Juice, white grape juice, white cranberry juice  Gatorade, Power Aid, Crystal Light, Harry Aid  Lemonade and Limeade  Bouillon, clear soup  Snowball, popsicles  YOU CAN'T DRINK ANYTHING RED, PURPLE ORANGE OR BLUE   YOU CAN'T DRINK ALCOHOL  ONLY DRINK WHAT IS ON THE LIST      At 5 pm the night before your test:    Pour the 1st bottle of SUPREP into the cup provided in the box. Add water to the line on the cup and mix well.  Drink the whole cup and then drink 2 more full cups of water over 1 hour.  This can be easier to drink if it is cold. You can mix it 20 minutes ahead of time and place in the refrigerator before you drink it.  You must drink it within 30-45 minutes of mixing it.  Do NOT pour the drink over ice.  You  can drink it with a straw.    The Day of the test - We will call you 2 days before your test to tell you what time to get there.    5 hours before you come to the hospital (this may be in the middle of the night)  Pour the 2nd bottle of SUPREP into the cup provided in the box. Add water to the line on the cup and mix well.  Drink the whole cup and then drink 2 more full cups of water over 1 hour.  It might be easier to drink if it is cold. You can mix it 20 minutes ahead of time and place in the refrigerator before you drink it.  You must drink it within 30-45 minutes of mixing it.  Do NOT pour the drink over ice.  You can drink it with a straw.    YOU CAN'T EAT OR DRINK ANYTHING ELSE ONCE YOU FINISH THE PREP    Leave all valuables and jewelry at home. You will be at the hospital for 2-4 hours.    Call the Endoscopy department at [phone] with any questions about your procedure.

## 2024-03-27 DIAGNOSIS — E11.9 TYPE 2 DIABETES MELLITUS WITHOUT COMPLICATION, UNSPECIFIED WHETHER LONG TERM INSULIN USE: ICD-10-CM

## 2024-04-05 ENCOUNTER — TELEPHONE (OUTPATIENT)
Dept: GASTROENTEROLOGY | Facility: CLINIC | Age: 59
End: 2024-04-05
Payer: COMMERCIAL

## 2024-04-05 NOTE — TELEPHONE ENCOUNTER
Pt states that she is type 1 diabetic and she can not pause or stop the insulin for procedure that is scheduled on 04/26. Informed pt that she will need to contact her endocrinologist for further advise. V/U    ----- Message from Charlotte Paiz sent at 4/5/2024  3:43 PM CDT -----  Type:  Needs Medical Advice    Who Called: pt  Symptoms (please be specific): pt has procedure on 4/26 pt has questions about her prep please call right back to answer questions and concerns    Would the patient rather a call back or a response via [facility]? call  Best Call Back Number: [phone]  Additional Information:

## 2024-04-08 ENCOUNTER — TELEPHONE (OUTPATIENT)
Dept: GASTROENTEROLOGY | Facility: CLINIC | Age: 59
End: 2024-04-08
Payer: COMMERCIAL

## 2024-04-08 NOTE — TELEPHONE ENCOUNTER
Spoke with patient and scheduled clinic visit. V/U    ----- Message from Dorothy Sanders sent at 4/8/2024 10:50 AM CDT -----  Type:  Needs Medical Advice    Who Called:  Halle Spence    Would the patient rather a call back or a response via [facility]?  call  Best Call Back Number:   [phone]  Additional Information:  Pt would like a call back regarding instructions for her upcoming procedure that is scheduled for 4/26/24.  Pt states that she takes insulin and instructions indicate not to take insulin prior to procedure.  Pt states she cannot stop taking insulin and would like to know what next steps to take. .

## 2024-04-09 ENCOUNTER — PATIENT MESSAGE (OUTPATIENT)
Dept: INTERNAL MEDICINE | Facility: CLINIC | Age: 59
End: 2024-04-09
Payer: COMMERCIAL

## 2024-04-26 ENCOUNTER — ANESTHESIA (OUTPATIENT)
Dept: ENDOSCOPY | Facility: HOSPITAL | Age: 59
End: 2024-04-26
Payer: COMMERCIAL

## 2024-04-26 ENCOUNTER — ANESTHESIA EVENT (OUTPATIENT)
Dept: ENDOSCOPY | Facility: HOSPITAL | Age: 59
End: 2024-04-26
Payer: COMMERCIAL

## 2024-04-26 ENCOUNTER — HOSPITAL ENCOUNTER (OUTPATIENT)
Facility: HOSPITAL | Age: 59
Discharge: HOME | End: 2024-04-26
Attending: INTERNAL MEDICINE | Admitting: INTERNAL MEDICINE
Payer: COMMERCIAL

## 2024-04-26 VITALS
DIASTOLIC BLOOD PRESSURE: 66 MMHG | SYSTOLIC BLOOD PRESSURE: 112 MMHG | OXYGEN SATURATION: 99 % | BODY MASS INDEX: 26.33 KG/M2 | WEIGHT: 158 LBS | TEMPERATURE: 98 F | HEART RATE: 60 BPM | RESPIRATION RATE: 13 BRPM | HEIGHT: 65 IN

## 2024-04-26 DIAGNOSIS — Z12.11 SCREEN FOR COLON CANCER: ICD-10-CM

## 2024-04-26 LAB
GLUCOSE SERPL-MCNC: 110 MG/DL (ref 70–110)
POCT GLUCOSE: 107 MG/DL (ref 70–110)
POCT GLUCOSE: 110 MG/DL (ref 70–110)

## 2024-04-26 PROCEDURE — 25000003 PHARM REV CODE 250: Performed by: ANESTHESIOLOGY

## 2024-04-26 PROCEDURE — 45385 COLONOSCOPY W/LESION REMOVAL: CPT | Mod: 33,,, | Performed by: INTERNAL MEDICINE

## 2024-04-26 PROCEDURE — 25000003 PHARM REV CODE 250: Performed by: NURSE ANESTHETIST, CERTIFIED REGISTERED

## 2024-04-26 PROCEDURE — 63600175 PHARM REV CODE 636 W HCPCS: Performed by: NURSE ANESTHETIST, CERTIFIED REGISTERED

## 2024-04-26 PROCEDURE — 37000008 HC ANESTHESIA 1ST 15 MINUTES: Performed by: INTERNAL MEDICINE

## 2024-04-26 PROCEDURE — 25000003 PHARM REV CODE 250: Performed by: INTERNAL MEDICINE

## 2024-04-26 PROCEDURE — 82962 GLUCOSE BLOOD TEST: CPT | Performed by: INTERNAL MEDICINE

## 2024-04-26 PROCEDURE — D9220A PRA ANESTHESIA: Mod: 33,CRNA,, | Performed by: NURSE ANESTHETIST, CERTIFIED REGISTERED

## 2024-04-26 PROCEDURE — 45385 COLONOSCOPY W/LESION REMOVAL: CPT | Mod: PT | Performed by: INTERNAL MEDICINE

## 2024-04-26 PROCEDURE — D9220A PRA ANESTHESIA: Mod: 33,ANES,, | Performed by: ANESTHESIOLOGY

## 2024-04-26 PROCEDURE — 37000009 HC ANESTHESIA EA ADD 15 MINS: Performed by: INTERNAL MEDICINE

## 2024-04-26 PROCEDURE — 88305 TISSUE EXAM BY PATHOLOGIST: CPT | Mod: 59 | Performed by: PATHOLOGY

## 2024-04-26 PROCEDURE — 88305 TISSUE EXAM BY PATHOLOGIST: CPT | Mod: 26,,, | Performed by: PATHOLOGY

## 2024-04-26 PROCEDURE — 27201089 HC SNARE, DISP (ANY): Performed by: INTERNAL MEDICINE

## 2024-04-26 RX ORDER — SODIUM CHLORIDE 9 MG/ML
INJECTION, SOLUTION INTRAVENOUS CONTINUOUS
Status: DISCONTINUED | OUTPATIENT
Start: 2024-04-26 | End: 2024-04-26 | Stop reason: HOSPADM

## 2024-04-26 RX ORDER — LIDOCAINE HYDROCHLORIDE 20 MG/ML
INJECTION INTRAVENOUS
Status: DISCONTINUED | OUTPATIENT
Start: 2024-04-26 | End: 2024-04-26

## 2024-04-26 RX ORDER — PROPOFOL 10 MG/ML
VIAL (ML) INTRAVENOUS
Status: DISCONTINUED | OUTPATIENT
Start: 2024-04-26 | End: 2024-04-26

## 2024-04-26 RX ORDER — PROPOFOL 10 MG/ML
VIAL (ML) INTRAVENOUS CONTINUOUS PRN
Status: DISCONTINUED | OUTPATIENT
Start: 2024-04-26 | End: 2024-04-26

## 2024-04-26 RX ORDER — DEXTROSE MONOHYDRATE 100 MG/ML
INJECTION, SOLUTION INTRAVENOUS CONTINUOUS
Status: DISCONTINUED | OUTPATIENT
Start: 2024-04-26 | End: 2024-04-26 | Stop reason: HOSPADM

## 2024-04-26 RX ORDER — SODIUM CHLORIDE 0.9 % (FLUSH) 0.9 %
10 SYRINGE (ML) INJECTION
Status: DISCONTINUED | OUTPATIENT
Start: 2024-04-26 | End: 2024-04-26 | Stop reason: HOSPADM

## 2024-04-26 RX ADMIN — DEXTROSE 125 ML: 10 SOLUTION INTRAVENOUS at 09:04

## 2024-04-26 RX ADMIN — LIDOCAINE HYDROCHLORIDE 100 MG: 20 INJECTION, SOLUTION INTRAVENOUS at 10:04

## 2024-04-26 RX ADMIN — PROPOFOL 100 MG: 10 INJECTION, EMULSION INTRAVENOUS at 10:04

## 2024-04-26 RX ADMIN — PROPOFOL 150 MCG/KG/MIN: 10 INJECTION, EMULSION INTRAVENOUS at 10:04

## 2024-04-26 RX ADMIN — SODIUM CHLORIDE: 0.9 INJECTION, SOLUTION INTRAVENOUS at 09:04

## 2024-04-26 NOTE — H&P
Short Stay Endoscopy History and Physical    PCP - Vilma Milan MD    Procedure - Colonoscopy  ASA - per anesthesia  Mallampati - per anesthesia  History of Anesthesia problems - no  Family history Anesthesia problems - no   Plan of anesthesia - General    HPI:  This is a 58 y.o. female here for evaluation of : asymptomatic screening exam      ROS:  Constitutional: No fevers, chills, No weight loss  CV: No chest pain  Pulm: No cough, No shortness of breath  GI: see HPI  Derm: No rash    Medical History:  has a past medical history of Allergic rhinitis, Diabetes mellitus type 1, Hypothyroidism due to Hashimoto's thyroiditis, and Pes planus.    Surgical History:  has a past surgical history that includes Appendectomy; Hysterectomy; Foot surgery; Hand surgery; and Oophorectomy.    Family History: family history includes Atrial fibrillation in her mother; Diabetes in her father, maternal grandfather, and paternal grandfather; Heart disease in her mother.. Otherwise no colon cancer, inflammatory bowel disease, or GI malignancies.    Social History:  reports that she has never smoked. She has never used smokeless tobacco. She reports current alcohol use.    Review of patient's allergies indicates:   Allergen Reactions    Codeine Anaphylaxis and Nausea And Vomiting    Sulfamethoxazole-trimethoprim Hives, Itching and Rash    Diphenhydramine hcl Itching       Medications:   Medications Prior to Admission   Medication Sig Dispense Refill Last Dose    amitriptyline (ELAVIL) 10 MG tablet Take 10 mg by mouth every evening.   4/25/2024    butalbital-acetaminophen-caffeine -40 mg (FIORICET, ESGIC) -40 mg per tablet Take 1 tablet by mouth daily as needed.   4/25/2024    rizatriptan (MAXALT) 10 MG tablet TAKE 1 TABLET BY MOUTH DAILY AS NEEDED FOR MIGRAINE. MAY REPEAT IN 2 HOURS AS NEEDED   Past Month    blood-glucose sensor (DEXCOM G6 SENSOR) Elis        blood-glucose transmitter (DEXCOM G6 TRANSMITTER) Elis         CONTOUR NEXT TEST STRIPS Strp USE TO TEST 5 TIME PER DAY       DEXCOM G6 SENSOR Elis        fluticasone propionate (FLONASE) 50 mcg/actuation nasal spray SHAKE LIQUID AND USE 2 SPRAYS(100 MCG) IN EACH NOSTRIL EVERY DAY (Patient not taking: Reported on 12/5/2023) 16 g 0     insulin lispro-aabc (LYUMJEV U-100 INSULIN) 100 unit/mL INJECT 100 UNITS INTO PUMP DAILY       levothyroxine (SYNTHROID) 88 MCG tablet Take 88 mcg by mouth once daily.        meloxicam (MOBIC) 7.5 MG tablet TAKE 1 TABLET(7.5 MG) BY MOUTH DAILY AS NEEDED FOR PAIN (Patient not taking: Reported on 12/5/2023) 30 tablet 0     OMNIPOD 5 G6 INTRO KIT, GEN 5, Crtg use as directed       OMNIPOD 5 G6 PODS, GEN 5, Crtg CHANGE EACH POD EVERY 3 DAYS       ondansetron (ZOFRAN-ODT) 4 MG TbDL Place 4 mg under the tongue every 8 (eight) hours as needed.            Physical Exam:    Vital Signs:   Vitals:    04/26/24 0931   BP: 128/68   Pulse: 70   Resp: 18   Temp: 97.9 °F (36.6 °C)       General Appearance: Well appearing in no acute distress  Eyes:    No scleral icterus  ENT: Neck supple, Lips, mucosa, and tongue normal; teeth and gums normal  Abdomen: Soft, non tender, non distended with positive bowel sounds. No hepatosplenomegaly, ascites, or mass.  Extremities: 2+ pulses, no clubbing, cyanosis or edema  Skin: No rash      Labs:  Lab Results   Component Value Date    WBC 5.65 04/02/2020    HGB 12.5 04/02/2020    HCT 36.3 (L) 04/02/2020     04/02/2020    CHOL 219 (H) 11/25/2019    TRIG 101 03/14/2022    HDL 94 03/14/2022    ALT 17 03/31/2020    AST 17 03/31/2020     04/02/2020    K 3.4 (L) 04/02/2020     04/02/2020    CREATININE 0.7 04/02/2020    BUN 7 04/02/2020    CO2 23 04/02/2020    TSH 1.104 11/25/2019    INR 0.9 03/28/2020    HGBA1C 7.8 (A) 03/14/2022       I have explained the risks and benefits of endoscopy procedures to the patient including but not limited to bleeding, perforation, infection, and death.  The patient was asked  if they understand and allowed to ask any further questions to their satisfaction.    Simon Clancy MD

## 2024-04-26 NOTE — ANESTHESIA PREPROCEDURE EVALUATION
04/26/2024    Halle Spence is a 58 y.o., female.    Past Medical History:   Diagnosis Date    Allergic rhinitis     Diabetes mellitus type 1     Hypothyroidism due to Hashimoto's thyroiditis     Pes planus      Past Surgical History:   Procedure Laterality Date    APPENDECTOMY      FOOT SURGERY      HAND SURGERY      HYSTERECTOMY      OOPHORECTOMY             Pre-op Assessment    I have reviewed the Patient Summary Reports.     I have reviewed the Nursing Notes. I have reviewed the NPO Status.      Review of Systems  Anesthesia Hx:   History of prior surgery of interest to airway management or planning:            Denies Personal Hx of Anesthesia complications.                    Cardiovascular:  Exercise tolerance: good                                           Neurological:      Headaches                                 Endocrine:  Diabetes, type 1 Hypothyroidism              Physical Exam  General: Well nourished    Airway:  Mallampati: II   Mouth Opening: Normal  Neck ROM: Normal ROM        Anesthesia Plan  Type of Anesthesia, risks & benefits discussed:    Anesthesia Type: Gen Natural Airway  Informed Consent: Informed consent signed with the Patient and all parties understand the risks and agree with anesthesia plan.  All questions answered.   ASA Score: 3    Ready For Surgery From Anesthesia Perspective.     .

## 2024-04-26 NOTE — PROVATION PATIENT INSTRUCTIONS
Discharge Summary/Instructions after an Endoscopic Procedure  Patient Name: Halle Spence  Patient MRN: [id number]  Patient YOB: 1965 Friday, April 26, 2024  Simon Clancy MD  Dear patient,  As a result of recent federal legislation (The Federal Cures Act), you may   receive lab or pathology results from your procedure in your [facility]   account before your physician is able to contact you. Your physician or   their representative will relay the results to you with their   recommendations at their soonest availability.  Thank you,  Your health is very important to us during the Covid Crisis. Following your   procedure today, you will receive a daily text for 2 weeks asking about   signs or symptoms of Covid 19.  Please respond to this text when you   receive it so we can follow up and keep you as safe as possible.   RESTRICTIONS:  During your procedure today, you received medications for sedation.  These   medications may affect your judgment, balance and coordination.  Therefore,   for 24 hours, you have the following restrictions:   - DO NOT drive a car, operate machinery, make legal/financial decisions,   sign important papers or drink alcohol.    ACTIVITY:  Today: no heavy lifting, straining or running due to procedural   sedation/anesthesia.  The following day: return to full activity including work.  DIET:  Eat and drink normally unless instructed otherwise.     TREATMENT FOR COMMON SIDE EFFECTS:  - Mild abdominal pain, nausea, belching, bloating or excessive gas:  rest,   eat lightly and use a heating pad.  - Sore Throat: treat with throat lozenges and/or gargle with warm salt   water.  - Because air was used during the procedure, expelling large amounts of air   from your rectum or belching is normal.  - If a bowel prep was taken, you may not have a bowel movement for 1-3 days.    This is normal.  SYMPTOMS TO WATCH FOR AND REPORT TO YOUR PHYSICIAN:  1. Abdominal pain or bloating, other than gas  cramps.  2. Chest pain.  3. Back pain.  4. Signs of infection such as: chills or fever occurring within 24 hours   after the procedure.  5. Rectal bleeding, which would show as bright red, maroon, or black stools.   (A tablespoon of blood from the rectum is not serious, especially if   hemorrhoids are present.)  6. Vomiting.  7. Weakness or dizziness.  GO DIRECTLY TO THE NEAREST EMERGENCY ROOM IF YOU HAVE ANY OF THE FOLLOWING:      Difficulty breathing              Chills and/or fever over 101 F   Persistent vomiting and/or vomiting blood   Severe abdominal pain   Severe chest pain   Black, tarry stools   Bleeding- more than one tablespoon   Any other symptom or condition that you feel may need urgent attention  Your doctor recommends these additional instructions:  If any biopsies were taken, your doctors clinic will contact you in 1 to 2   weeks with any results.  - Discharge patient to home.   - Patient has a contact number available for emergencies.  The signs and   symptoms of potential delayed complications were discussed with the   patient.  Return to normal activities tomorrow.  Written discharge   instructions were provided to the patient.   - Resume previous diet.   - Continue present medications.   - Await pathology results.   - Repeat colonoscopy in 3 years for surveillance.  For questions, problems or results please call your physician - Simon Clancy MD.  EMERGENCY PHONE NUMBER: [phone],  LAB RESULTS: [phone]  IF A COMPLICATION OR EMERGENCY SITUATION ARISES AND YOU ARE UNABLE TO REACH   YOUR PHYSICIAN - GO DIRECTLY TO THE EMERGENCY ROOM.  Simon Clancy MD  4/26/2024 10:49:26 AM  This report has been verified and signed electronically.  Dear patient,  As a result of recent federal legislation (The Federal Cures Act), you may   receive lab or pathology results from your procedure in your [facility]   account before your physician is able to contact you. Your physician or   their  representative will relay the results to you with their   recommendations at their soonest availability.  Thank you,  PROVATION

## 2024-04-26 NOTE — ANESTHESIA POSTPROCEDURE EVALUATION
Anesthesia Post Evaluation    Patient: Halle Spence    Procedure(s) Performed: Procedure(s) (LRB):  COLONOSCOPY (N/A)    Final Anesthesia Type: general      Patient location during evaluation: PACU  Patient participation: Yes- Able to Participate  Level of consciousness: awake and alert  Post-procedure vital signs: reviewed and stable  Pain management: adequate  Airway patency: patent    PONV status at discharge: No PONV  Anesthetic complications: no      Cardiovascular status: blood pressure returned to baseline  Respiratory status: unassisted  Hydration status: euvolemic                Vitals Value Taken Time   /66 04/26/24 1106   Temp 36.7 04/26/24 1118   Pulse 70 04/26/24 1106   Resp 16 04/26/24 1106   SpO2 97 % 04/26/24 1106         No case tracking events are documented in the log.      Pain/Nette Score: Nette Score: 10 (4/26/2024 10:55 AM)

## 2024-04-26 NOTE — TRANSFER OF CARE
"Anesthesia Transfer of Care Note    Patient: Halle Spence    Procedure(s) Performed: Procedure(s) (LRB):  COLONOSCOPY (N/A)    Patient location: GI    Anesthesia Type: general    Transport from OR: Transported from OR on room air with adequate spontaneous ventilation    Post pain: adequate analgesia    Post assessment: no apparent anesthetic complications and tolerated procedure well    Post vital signs: stable    Level of consciousness: awake    Nausea/Vomiting: no nausea/vomiting    Complications: none    Transfer of care protocol was followed      Last vitals: Visit Vitals  /68   Pulse 70   Temp 36.6 °C (97.9 °F)   Resp 18   Ht 5' 5" (1.651 m)   Wt 71.7 kg (158 lb)   LMP 10/17/2003   SpO2 100%   Breastfeeding No   BMI 26.29 kg/m²     "

## 2024-04-30 ENCOUNTER — PATIENT OUTREACH (OUTPATIENT)
Dept: ADMINISTRATIVE | Facility: HOSPITAL | Age: 59
End: 2024-04-30
Payer: COMMERCIAL

## 2024-04-30 LAB
FINAL PATHOLOGIC DIAGNOSIS: NORMAL
GROSS: NORMAL
Lab: NORMAL

## 2024-04-30 NOTE — PROGRESS NOTES
Health Maintenance Topic(s) Outreach Outcomes & Actions Taken:         Additional Notes:  PCP - is with [facility] 04/30/2024         Care Management, Digital Medicine, and/or Education Referrals

## 2024-06-20 LAB
ALBUMIN CREATININE RATIO: 8 MG/G
ALBUMIN, URINE: 0.4 MG/L
CREAT SERPL-MCNC: 0.8 MG/DL
CREATININE RANDOM URINE: 53 MG/DL
EGFR: 81
HBA1C MFR BLD: 7 % (ref 4–6)

## 2024-09-10 NOTE — PATIENT INSTRUCTIONS
Ear Pain:  At this time you do not have an ear infection  Humidifier use at home.  Warm compresses to affected ear  Elevate head on a pillow at night   Flonase Nasal Spray as directed for nasal congestion  Over the Counter Claritin or Zyrtec (plain) as directed for allergy symptoms  Tylenol or Motrin every 4 - 6 hours as needed for fever or ear pain.  Follow up with your PCP in 1 week of initiating antibiotics or sooner for no improvement in symptoms  Follow up in the ER for any worsening of symptoms such as new fever, increasing ear pain, neck stiffness, shortness of breath, etc.  If you smoke, stop smoking.    SORE THROAT:    You may gargle with hot salt water 4 times a day for the next 2 days and then you may also gargle diluted hydrogen peroxide once to twice daily to alleviate some of your throat discomfort.  Drink plenty of fluids, recommend warm tea with honey.     YOU MAY USE OVER-THE-COUNTER CEPACOL FOR SOOTHING OF YOUR THROAT.  You may wish to avoid spicy food, citrus fruits, and red sauces- as this may irritate the throat more.    You can also take a daily anti-histamine such as Zyrtec, Claritin, Xyzal, OR Allegra-IN DAYTIME; NON DROWSY) AND/OR Benadryl- AT NIGHT; DROWSY) to help with runny nose/sneezing/sore throat/cough.    If your symptoms do not improve, you should return to this clinic. If your symptoms worsen, go to the emergency room.       FLONASE AS DIRECTED--    How do you use a Nasal Spray?    Make sure you understand your dosing instructions. Spray only the number of prescribed sprays in each nostril. Read the package instructions before using your spray the first time.    Most sprays suggest the following steps:    Wash your hands well.    Gently blow your nose to clear the passageway.    Shake the container several times.    Tilt your head slightly downward.  Use the opposite hand from the nostril you will be spraying to hold the spray bottle.    Block one nostril with your finger.  Insert  the nasal applicator into the other nostril.    Aim the spray toward the outer wall of the nostril.  Inhale slowly through the nose and press the .    Breathe out and repeat to apply the prescribed number of sprays.  Repeat these steps for the other nostril.     Avoid sneezing or blowing your nose right after spraying.            Heart Cath

## 2024-10-22 LAB — HBA1C MFR BLD: 6.9 % (ref 4–6)

## 2024-12-04 ENCOUNTER — PATIENT OUTREACH (OUTPATIENT)
Dept: ADMINISTRATIVE | Facility: HOSPITAL | Age: 59
End: 2024-12-04
Payer: COMMERCIAL

## 2024-12-04 DIAGNOSIS — E11.9 TYPE 2 DIABETES MELLITUS WITHOUT COMPLICATION: ICD-10-CM

## 2024-12-08 DIAGNOSIS — E87.6 HYPOKALEMIA: ICD-10-CM

## 2024-12-08 DIAGNOSIS — E10.8 TYPE 1 DIABETES MELLITUS WITH COMPLICATIONS: Chronic | ICD-10-CM

## 2024-12-08 DIAGNOSIS — E06.3 HYPOTHYROIDISM DUE TO HASHIMOTO'S THYROIDITIS: Primary | Chronic | ICD-10-CM

## 2024-12-08 DIAGNOSIS — E83.39 HYPOPHOSPHATEMIA: ICD-10-CM

## 2024-12-08 PROBLEM — Z20.822 SUSPECTED 2019 NOVEL CORONAVIRUS INFECTION: Status: RESOLVED | Noted: 2020-03-28 | Resolved: 2024-12-08

## 2024-12-08 PROBLEM — E10.10 DIABETIC KETOACIDOSIS WITHOUT COMA ASSOCIATED WITH TYPE 1 DIABETES MELLITUS: Status: RESOLVED | Noted: 2020-03-28 | Resolved: 2024-12-08

## 2024-12-13 ENCOUNTER — HOSPITAL ENCOUNTER (OUTPATIENT)
Dept: RADIOLOGY | Facility: HOSPITAL | Age: 59
Discharge: HOME | End: 2024-12-13
Payer: COMMERCIAL

## 2024-12-13 DIAGNOSIS — Z12.31 ENCOUNTER FOR SCREENING MAMMOGRAM FOR BREAST CANCER: ICD-10-CM

## 2024-12-13 PROCEDURE — 77067 SCR MAMMO BI INCL CAD: CPT | Mod: TC

## 2024-12-13 PROCEDURE — 77063 BREAST TOMOSYNTHESIS BI: CPT | Mod: 26,,, | Performed by: RADIOLOGY

## 2024-12-13 PROCEDURE — 77067 SCR MAMMO BI INCL CAD: CPT | Mod: 26,,, | Performed by: RADIOLOGY

## 2024-12-16 ENCOUNTER — PATIENT MESSAGE (OUTPATIENT)
Dept: ADMINISTRATIVE | Facility: HOSPITAL | Age: 59
End: 2024-12-16
Payer: COMMERCIAL

## 2024-12-20 ENCOUNTER — TELEPHONE (OUTPATIENT)
Dept: FAMILY MEDICINE | Facility: CLINIC | Age: 59
End: 2024-12-20
Payer: COMMERCIAL

## 2024-12-20 NOTE — TELEPHONE ENCOUNTER
Patient was here for her 3pm appt with dr. Sheppard and pt came out the room requesting to reschedule she stated that she cannot wait and need to go back to work. I offered to reschedule appt and she stated that she will reschedule through the portal.

## 2025-01-06 ENCOUNTER — PATIENT OUTREACH (OUTPATIENT)
Dept: ADMINISTRATIVE | Facility: HOSPITAL | Age: 60
End: 2025-01-06
Payer: COMMERCIAL

## 2025-01-10 ENCOUNTER — PATIENT OUTREACH (OUTPATIENT)
Dept: ADMINISTRATIVE | Facility: HOSPITAL | Age: 60
End: 2025-01-10
Payer: COMMERCIAL

## 2025-02-13 ENCOUNTER — PATIENT OUTREACH (OUTPATIENT)
Dept: ADMINISTRATIVE | Facility: HOSPITAL | Age: 60
End: 2025-02-13
Payer: COMMERCIAL

## 2025-02-13 NOTE — PROGRESS NOTES
02/13/2025  VB chart audit performed. Care Everywhere updates requested and reviewed  Overdue HM topic chart audit and/or requested. LINKS triggered and reconciled. Media reviewed. LABS -scheduled

## 2025-02-15 ENCOUNTER — LAB VISIT (OUTPATIENT)
Dept: LAB | Facility: HOSPITAL | Age: 60
End: 2025-02-15
Attending: FAMILY MEDICINE
Payer: COMMERCIAL

## 2025-02-15 DIAGNOSIS — E10.8 TYPE 1 DIABETES MELLITUS WITH COMPLICATIONS: Chronic | ICD-10-CM

## 2025-02-15 DIAGNOSIS — E11.9 TYPE 2 DIABETES MELLITUS WITHOUT COMPLICATION: ICD-10-CM

## 2025-02-15 DIAGNOSIS — E83.39 HYPOPHOSPHATEMIA: ICD-10-CM

## 2025-02-15 DIAGNOSIS — E87.6 HYPOKALEMIA: ICD-10-CM

## 2025-02-15 DIAGNOSIS — E06.3 HYPOTHYROIDISM DUE TO HASHIMOTO'S THYROIDITIS: Chronic | ICD-10-CM

## 2025-02-15 LAB
ALBUMIN SERPL BCP-MCNC: 4 G/DL (ref 3.5–5.2)
ALP SERPL-CCNC: 115 U/L (ref 40–150)
ALT SERPL W/O P-5'-P-CCNC: 25 U/L (ref 10–44)
ANION GAP SERPL CALC-SCNC: 9 MMOL/L (ref 8–16)
AST SERPL-CCNC: 68 U/L (ref 10–40)
BASOPHILS # BLD AUTO: 0.1 K/UL (ref 0–0.2)
BASOPHILS NFR BLD: 1.8 % (ref 0–1.9)
BILIRUB SERPL-MCNC: 0.5 MG/DL (ref 0.1–1)
BUN SERPL-MCNC: 18 MG/DL (ref 8–23)
CALCIUM SERPL-MCNC: 9.2 MG/DL (ref 8.7–10.5)
CHLORIDE SERPL-SCNC: 109 MMOL/L (ref 95–110)
CHOLEST SERPL-MCNC: 190 MG/DL (ref 120–199)
CHOLEST/HDLC SERPL: 2.6 {RATIO} (ref 2–5)
CO2 SERPL-SCNC: 23 MMOL/L (ref 23–29)
CREAT SERPL-MCNC: 0.8 MG/DL (ref 0.5–1.4)
DIFFERENTIAL METHOD BLD: NORMAL
EOSINOPHIL # BLD AUTO: 0.3 K/UL (ref 0–0.5)
EOSINOPHIL NFR BLD: 5 % (ref 0–8)
ERYTHROCYTE [DISTWIDTH] IN BLOOD BY AUTOMATED COUNT: 13.3 % (ref 11.5–14.5)
EST. GFR  (NO RACE VARIABLE): >60 ML/MIN/1.73 M^2
GLUCOSE SERPL-MCNC: 98 MG/DL (ref 70–110)
HCT VFR BLD AUTO: 42.3 % (ref 37–48.5)
HDLC SERPL-MCNC: 74 MG/DL (ref 40–75)
HDLC SERPL: 38.9 % (ref 20–50)
HGB BLD-MCNC: 13.6 G/DL (ref 12–16)
IMM GRANULOCYTES # BLD AUTO: 0.01 K/UL (ref 0–0.04)
IMM GRANULOCYTES NFR BLD AUTO: 0.2 % (ref 0–0.5)
LDLC SERPL CALC-MCNC: 106.2 MG/DL (ref 63–159)
LYMPHOCYTES # BLD AUTO: 2.5 K/UL (ref 1–4.8)
LYMPHOCYTES NFR BLD: 45.6 % (ref 18–48)
MCH RBC QN AUTO: 30.2 PG (ref 27–31)
MCHC RBC AUTO-ENTMCNC: 32.2 G/DL (ref 32–36)
MCV RBC AUTO: 94 FL (ref 82–98)
MONOCYTES # BLD AUTO: 0.3 K/UL (ref 0.3–1)
MONOCYTES NFR BLD: 6.3 % (ref 4–15)
NEUTROPHILS # BLD AUTO: 2.2 K/UL (ref 1.8–7.7)
NEUTROPHILS NFR BLD: 41.1 % (ref 38–73)
NONHDLC SERPL-MCNC: 116 MG/DL
NRBC BLD-RTO: 0 /100 WBC
PHOSPHATE SERPL-MCNC: 3.7 MG/DL (ref 2.7–4.5)
PLATELET # BLD AUTO: 226 K/UL (ref 150–450)
PMV BLD AUTO: 10.7 FL (ref 9.2–12.9)
POTASSIUM SERPL-SCNC: 4.3 MMOL/L (ref 3.5–5.1)
PROT SERPL-MCNC: 6.7 G/DL (ref 6–8.4)
RBC # BLD AUTO: 4.51 M/UL (ref 4–5.4)
SODIUM SERPL-SCNC: 141 MMOL/L (ref 136–145)
TRIGL SERPL-MCNC: 49 MG/DL (ref 30–150)
TSH SERPL DL<=0.005 MIU/L-ACNC: 0.78 UIU/ML (ref 0.4–4)
WBC # BLD AUTO: 5.42 K/UL (ref 3.9–12.7)

## 2025-02-15 PROCEDURE — 84100 ASSAY OF PHOSPHORUS: CPT | Performed by: FAMILY MEDICINE

## 2025-02-15 PROCEDURE — 85025 COMPLETE CBC W/AUTO DIFF WBC: CPT | Performed by: FAMILY MEDICINE

## 2025-02-15 PROCEDURE — 36415 COLL VENOUS BLD VENIPUNCTURE: CPT | Mod: PO | Performed by: FAMILY MEDICINE

## 2025-02-15 PROCEDURE — 84443 ASSAY THYROID STIM HORMONE: CPT | Performed by: FAMILY MEDICINE

## 2025-02-15 PROCEDURE — 80053 COMPREHEN METABOLIC PANEL: CPT | Performed by: FAMILY MEDICINE

## 2025-02-15 PROCEDURE — 80061 LIPID PANEL: CPT | Performed by: FAMILY MEDICINE

## 2025-02-18 ENCOUNTER — OFFICE VISIT (OUTPATIENT)
Dept: FAMILY MEDICINE | Facility: CLINIC | Age: 60
End: 2025-02-18
Payer: COMMERCIAL

## 2025-02-18 ENCOUNTER — RESULTS FOLLOW-UP (OUTPATIENT)
Dept: FAMILY MEDICINE | Facility: CLINIC | Age: 60
End: 2025-02-18

## 2025-02-18 VITALS
WEIGHT: 156.94 LBS | OXYGEN SATURATION: 97 % | SYSTOLIC BLOOD PRESSURE: 122 MMHG | HEART RATE: 67 BPM | HEIGHT: 65 IN | DIASTOLIC BLOOD PRESSURE: 78 MMHG | BODY MASS INDEX: 26.15 KG/M2

## 2025-02-18 DIAGNOSIS — R74.8 ELEVATED LIVER ENZYMES: ICD-10-CM

## 2025-02-18 DIAGNOSIS — E10.8 TYPE 1 DIABETES MELLITUS WITH COMPLICATIONS: Chronic | ICD-10-CM

## 2025-02-18 DIAGNOSIS — Z12.31 ENCOUNTER FOR SCREENING MAMMOGRAM FOR BREAST CANCER: ICD-10-CM

## 2025-02-18 DIAGNOSIS — J30.1 SEASONAL ALLERGIC RHINITIS DUE TO POLLEN: ICD-10-CM

## 2025-02-18 DIAGNOSIS — Z00.01 ENCOUNTER FOR GENERAL ADULT MEDICAL EXAMINATION WITH ABNORMAL FINDINGS: Primary | ICD-10-CM

## 2025-02-18 DIAGNOSIS — G43.E09 CHRONIC MIGRAINE WITH AURA WITHOUT STATUS MIGRAINOSUS, NOT INTRACTABLE: ICD-10-CM

## 2025-02-18 DIAGNOSIS — E06.3 HYPOTHYROIDISM DUE TO HASHIMOTO'S THYROIDITIS: Chronic | ICD-10-CM

## 2025-02-18 PROBLEM — E83.39 HYPOPHOSPHATEMIA: Status: RESOLVED | Noted: 2020-03-31 | Resolved: 2025-02-18

## 2025-02-18 PROBLEM — E87.6 HYPOKALEMIA: Status: RESOLVED | Noted: 2020-03-31 | Resolved: 2025-02-18

## 2025-02-18 NOTE — PROGRESS NOTES
Subjective:         Patient ID: Halle Spence is a 59 y.o. female.    Chief Complaint: Annual Exam and Establish Care    Patient Active Problem List   Diagnosis    Seasonal allergic rhinitis due to pollen    Hypothyroidism due to Hashimoto's thyroiditis    Type 1 diabetes mellitus with complications    History of pancreatitis    Chronic migraine with aura without status migrainosus, not intractable    Vitamin D insufficiency      HPI    Halle is a 59 y.o. female        History of Present Illness    CHIEF COMPLAINT:  Halle presents today for follow up and to establish care with a new provider.    NEUROLOGICAL - MIGRAINES:  She is followed by neurologist Dr. Mantilla for migraines. She experienced a significant migraine episode during a flight in November with symptoms including aura, hand freezing to bathroom handle, inability to speak, and buckling legs. Following this incident, TIA workup including CT scan and carotid ultrasound led to diagnosis of complex migraine. She reports having a migraine yesterday    TYPE 1 DIABETES:  She is followed by endocrinologist Dr. Donaldson and uses Dexcom 7 continuous glucose monitor with Omnipod insulin pump for management. She is scheduled for diabetic eye exam in March at [facility] in [place].    SURGICAL HISTORY:  Her surgical history includes appendectomy and hysterectomy with oophorectomy.    PREVENTIVE CARE:  Mammogram in December was normal. Colonoscopy in April 2024 showed 6-8 polyps, requiring 3-year recall.    FAMILY HISTORY:  Her mother has atrial fibrillation (AFib).        The 10-year ASCVD risk score (Nata LOCKETT, et al., 2019) is: 5.1%    Values used to calculate the score:      Age: 59 years      Sex: Female      Is Non- : No      Diabetic: Yes      Tobacco smoker: No      Systolic Blood Pressure: 122 mmHg      Is BP treated: No      HDL Cholesterol: 74 mg/dL      Total Cholesterol: 190 mg/dL    Objective:     Vitals:    02/18/25 1109  "02/18/25 1145   BP: 138/80 122/78   BP Location: Right arm    Patient Position: Sitting    Pulse: 67    SpO2: 97%    Weight: 71.2 kg (156 lb 15.5 oz)    Height: 5' 5" (1.651 m)          Physical Exam  Vitals and nursing note reviewed.   Constitutional:       General: She is not in acute distress.     Appearance: Normal appearance. She is not ill-appearing, toxic-appearing or diaphoretic.   HENT:      Head: Normocephalic and atraumatic.   Eyes:      General: No scleral icterus.     Conjunctiva/sclera: Conjunctivae normal.   Cardiovascular:      Rate and Rhythm: Normal rate and regular rhythm.      Heart sounds: Normal heart sounds. No murmur heard.     Comments: Initially I heard regular couplets, EKG normal sinus rhythm. Likely breathing related and normal.   Pulmonary:      Effort: Pulmonary effort is normal. No respiratory distress.   Skin:     Coloration: Skin is not pale.   Neurological:      Mental Status: She is alert. Mental status is at baseline.   Psychiatric:         Attention and Perception: Attention and perception normal.         Mood and Affect: Mood and affect normal.         Speech: Speech normal.         Behavior: Behavior normal.         Cognition and Memory: Cognition and memory normal.         Judgment: Judgment normal.       Assessment:       1. Encounter for general adult medical examination with abnormal findings    2. Type 1 diabetes mellitus with complications    3. Hypothyroidism due to Hashimoto's thyroiditis    4. Elevated liver enzymes    5. Chronic migraine with aura without status migrainosus, not intractable    6. Seasonal allergic rhinitis due to pollen    7. Encounter for screening mammogram for breast cancer          Plan:   Recent relevant labs results reviewed with patient.         Assessment & Plan    Reviewed recent labs: LDL slightly elevated, ASCVD risk score low  Noted mild AST elevation (60s vs. normal <40); likely transient  Assessed TIA workup by Dr. Mantilla: diagnosed " as complex migraine  Evaluated mammogram from December: normal  Reviewed colonoscopy from April 2024: 6-8 polyps removed, 3-year recall recommended  Considered cholesterol management: may discuss adding medication in future if dietary changes ineffective  Performed cardiac exam: noted regular rate with possible couplets    COLONOSCOPY:  - Documented that the patient had a colonoscopy in April 2024 with 6-8 polyps found.  - Recommend a 3-year recall for colonoscopy due to the number of polyps found.  - Planned to schedule next colonoscopy in 3 years.    Cn.    ELEVATED LIVER ENZYMES:  - Discussed possible causes of mildly elevated liver enzymes, including fatty liver, dehydration, alcohol consumption, and Tylenol use.  - Advised on Tylenol use: limit to no more than 8 extra strength tablets in 24 hours, avoid pairing with alcohol.  - Discussed alternative pain management strategies for musculoskeletal pain, such as heat and muscle rubs.  - Repeat liver function tests (AST, ALT) ordered with next set of labs.      PNEUMOCOCCAL VACCINATION:  SHINGLES VACCINATION:  - Halle to return for pneumococcal and shingles vaccinations after returning from trip.    FOLLOW UP:  - Follow up for annual exam, coordinating labs with specialist appointments 3-4 months prior.         1. Encounter for general adult medical examination with abnormal findings  -     IN OFFICE EKG 12-LEAD (to Muse)  - Risk and age appropriate anticipatory guidance.  reviewed and updated. Recommendations discussed with patient as appropriate.     2. Type 1 diabetes mellitus with complications  -     IN OFFICE EKG 12-LEAD (to Muse)  Continue per Endo    3. Hypothyroidism due to Hashimoto's thyroiditis  TSH per Endo    4. Elevated liver enzymes  -     Hepatitis B Surface Ab, Qualitative; Future  -     Hepatitis B Surface Antigen; Future  -     Hepatitis C Antibody; Future  -     Hepatitis B Core Antibody, Total; Future  -     Iron and TIBC; Future; Expected  date: 02/18/2025  -     Ferritin; Future; Expected date: 02/18/2025  -     Hepatic Function Panel; Future; Expected date: 02/18/2025  Recheck. If persistently elevated, continue work up with liver ultrasound    5. Chronic migraine with aura without status migrainosus, not intractable  Per Neurologist    6. Seasonal allergic rhinitis due to pollen  Stable, chronic, ongoing.     7. Encounter for screening mammogram for breast cancer  -     Mammo Digital Screening Bilat w/ Aj (XPD); Future; Expected date: 12/01/2025      Patient's questions answered. Plan reviewed with patient at the end of visit. Relevant precautions to chief complaint and reasons to seek further medical care or to contact the office sooner reviewed with patient.     Follow up in about 1 year (around 2/18/2026) for Annual Exam.        Part of this note was dictated using voice recognition software. Please excuse any typographical errors.     This note was generated with the assistance of ambient listening technology. Verbal consent was obtained by the patient and accompanying visitor(s) for the recording of patient appointment to facilitate this note. I attest to having reviewed and edited the generated note for accuracy, though some syntax or spelling errors may persist. Please contact the author of this note for any clarification.

## 2025-02-19 LAB
OHS QRS DURATION: 70 MS
OHS QTC CALCULATION: 435 MS

## 2025-03-10 ENCOUNTER — PATIENT MESSAGE (OUTPATIENT)
Dept: FAMILY MEDICINE | Facility: CLINIC | Age: 60
End: 2025-03-10
Payer: COMMERCIAL

## 2025-05-22 ENCOUNTER — LAB VISIT (OUTPATIENT)
Dept: LAB | Facility: HOSPITAL | Age: 60
End: 2025-05-22
Payer: COMMERCIAL

## 2025-05-22 ENCOUNTER — OFFICE VISIT (OUTPATIENT)
Dept: FAMILY MEDICINE | Facility: CLINIC | Age: 60
End: 2025-05-22
Payer: COMMERCIAL

## 2025-05-22 VITALS
DIASTOLIC BLOOD PRESSURE: 78 MMHG | HEIGHT: 65 IN | SYSTOLIC BLOOD PRESSURE: 128 MMHG | WEIGHT: 152.13 LBS | OXYGEN SATURATION: 97 % | HEART RATE: 70 BPM | BODY MASS INDEX: 25.34 KG/M2

## 2025-05-22 DIAGNOSIS — R10.32 LEFT LOWER QUADRANT PAIN: ICD-10-CM

## 2025-05-22 DIAGNOSIS — R10.13 EPIGASTRIC PAIN: Primary | ICD-10-CM

## 2025-05-22 DIAGNOSIS — R10.13 EPIGASTRIC PAIN: ICD-10-CM

## 2025-05-22 DIAGNOSIS — E10.8 TYPE 1 DIABETES MELLITUS WITH COMPLICATIONS: Chronic | ICD-10-CM

## 2025-05-22 DIAGNOSIS — R13.10 DYSPHAGIA, UNSPECIFIED TYPE: ICD-10-CM

## 2025-05-22 LAB
ABSOLUTE EOSINOPHIL (OHS): 0.17 K/UL
ABSOLUTE MONOCYTE (OHS): 0.47 K/UL (ref 0.3–1)
ABSOLUTE NEUTROPHIL COUNT (OHS): 2.68 K/UL (ref 1.8–7.7)
ALBUMIN SERPL BCP-MCNC: 3.9 G/DL (ref 3.5–5.2)
ALP SERPL-CCNC: 163 UNIT/L (ref 40–150)
ALT SERPL W/O P-5'-P-CCNC: 35 UNIT/L (ref 10–44)
AMYLASE SERPL-CCNC: 70 UNIT/L (ref 20–110)
ANION GAP (OHS): 10 MMOL/L (ref 8–16)
AST SERPL-CCNC: 34 UNIT/L (ref 11–45)
BASOPHILS # BLD AUTO: 0.08 K/UL
BASOPHILS NFR BLD AUTO: 1.4 %
BILIRUB SERPL-MCNC: 0.4 MG/DL (ref 0.1–1)
BUN SERPL-MCNC: 14 MG/DL (ref 8–23)
CALCIUM SERPL-MCNC: 8.8 MG/DL (ref 8.7–10.5)
CHLORIDE SERPL-SCNC: 106 MMOL/L (ref 95–110)
CO2 SERPL-SCNC: 25 MMOL/L (ref 23–29)
CREAT SERPL-MCNC: 0.8 MG/DL (ref 0.5–1.4)
CRP SERPL-MCNC: 0.3 MG/L
ERYTHROCYTE [DISTWIDTH] IN BLOOD BY AUTOMATED COUNT: 12.6 % (ref 11.5–14.5)
ERYTHROCYTE [SEDIMENTATION RATE] IN BLOOD BY PHOTOMETRIC METHOD: 2 MM/HR
GFR SERPLBLD CREATININE-BSD FMLA CKD-EPI: >60 ML/MIN/1.73/M2
GLUCOSE SERPL-MCNC: 109 MG/DL (ref 70–110)
HCT VFR BLD AUTO: 39.2 % (ref 37–48.5)
HGB BLD-MCNC: 12.8 GM/DL (ref 12–16)
IMM GRANULOCYTES # BLD AUTO: 0.01 K/UL (ref 0–0.04)
IMM GRANULOCYTES NFR BLD AUTO: 0.2 % (ref 0–0.5)
LIPASE SERPL-CCNC: 14 U/L (ref 4–60)
LYMPHOCYTES # BLD AUTO: 2.33 K/UL (ref 1–4.8)
MCH RBC QN AUTO: 29.3 PG (ref 27–31)
MCHC RBC AUTO-ENTMCNC: 32.7 G/DL (ref 32–36)
MCV RBC AUTO: 90 FL (ref 82–98)
NUCLEATED RBC (/100WBC) (OHS): 0 /100 WBC
PLATELET # BLD AUTO: 276 K/UL (ref 150–450)
PMV BLD AUTO: 10.5 FL (ref 9.2–12.9)
POTASSIUM SERPL-SCNC: 3.9 MMOL/L (ref 3.5–5.1)
PROT SERPL-MCNC: 6.8 GM/DL (ref 6–8.4)
RBC # BLD AUTO: 4.37 M/UL (ref 4–5.4)
RELATIVE EOSINOPHIL (OHS): 3 %
RELATIVE LYMPHOCYTE (OHS): 40.6 % (ref 18–48)
RELATIVE MONOCYTE (OHS): 8.2 % (ref 4–15)
RELATIVE NEUTROPHIL (OHS): 46.6 % (ref 38–73)
SODIUM SERPL-SCNC: 141 MMOL/L (ref 136–145)
WBC # BLD AUTO: 5.74 K/UL (ref 3.9–12.7)

## 2025-05-22 PROCEDURE — 82150 ASSAY OF AMYLASE: CPT

## 2025-05-22 PROCEDURE — 3078F DIAST BP <80 MM HG: CPT | Mod: CPTII,S$GLB,,

## 2025-05-22 PROCEDURE — 82374 ASSAY BLOOD CARBON DIOXIDE: CPT

## 2025-05-22 PROCEDURE — 3008F BODY MASS INDEX DOCD: CPT | Mod: CPTII,S$GLB,,

## 2025-05-22 PROCEDURE — 86140 C-REACTIVE PROTEIN: CPT

## 2025-05-22 PROCEDURE — 99214 OFFICE O/P EST MOD 30 MIN: CPT | Mod: S$GLB,,,

## 2025-05-22 PROCEDURE — 36415 COLL VENOUS BLD VENIPUNCTURE: CPT | Mod: PO

## 2025-05-22 PROCEDURE — 99999 PR PBB SHADOW E&M-EST. PATIENT-LVL IV: CPT | Mod: PBBFAC,,,

## 2025-05-22 PROCEDURE — 83690 ASSAY OF LIPASE: CPT

## 2025-05-22 PROCEDURE — 85025 COMPLETE CBC W/AUTO DIFF WBC: CPT

## 2025-05-22 PROCEDURE — 85652 RBC SED RATE AUTOMATED: CPT

## 2025-05-22 PROCEDURE — 86677 HELICOBACTER PYLORI ANTIBODY: CPT

## 2025-05-22 PROCEDURE — 3074F SYST BP LT 130 MM HG: CPT | Mod: CPTII,S$GLB,,

## 2025-05-22 NOTE — PROGRESS NOTES
[facility]    5/26/2025      Subjective:       Patient ID:  Halle is a 59 y.o. female .  has a past medical history of Allergic rhinitis, Diabetes mellitus type 1, Hypothyroidism due to Hashimoto's thyroiditis, and Pes planus.    History of Present Illness    CHIEF COMPLAINT:  Ms. Spence presents today with nausea and difficulty eating for three weeks    GASTROINTESTINAL SYMPTOMS:  She reports nausea with difficulty eating certain foods, particularly meat, with sensation of food getting stuck in abdomen during swallowing. She experiences severe indigestion at night with urge to vomit, which worsens when lying down. She reports pain in both upper and lower abdomen, including pain with straining during bowel movements. She had an initial bout of diarrhea which has since subsided and denies blood in stool.    MEDICAL HISTORY:  She has Type 1 diabetes managed with an insulin pump and a history of ulcers during college. In 2022, she was hospitalized for 7 days with pancreatitis, initially mistaken for COVID-19. She has had a previous appendectomy. Colonoscopy in 2024 showed polyps which were removed.    MEDICATIONS:  She takes Tums for indigestion and has prescribed nausea medication from endocrinologist, which she has taken once in the past three weeks during a severe episode.    DIET AND WEIGHT:  She drinks mostly water with occasional tea. She has stopped consuming soft drinks and alcohol, including her usual glass of wine. She reports recent weight loss.      ROS:  General: -fever, -chills, -fatigue, -weight gain, +weight loss  Eyes: -vision changes, -redness, -discharge  ENT: -ear pain, -nasal congestion, -sore throat  Cardiovascular: -chest pain, -palpitations, -lower extremity edema  Respiratory: -cough, -shortness of breath  Gastrointestinal: +abdominal pain, +nausea, -vomiting, -diarrhea, +constipation, -blood in stool, +difficulty swallowing, +indigestion, +shortness of breath  lying down, +pain with defecation  Genitourinary: -dysuria, -hematuria, -frequency  Musculoskeletal: -joint pain, -muscle pain  Skin: -rash, -lesion  Neurological: -headache, -dizziness, -numbness, -tingling  Psychiatric: -anxiety, -depression, -sleep difficulty           Problem List[1]      Last HgbA1C:    Lab Results   Component Value Date    HGBA1C 6.9 (A) 10/22/2024    HGBA1C 7.0 (A) 06/20/2024    HGBA1C 7.0 (A) 02/19/2024         Last Lipid Panel:    Lab Results   Component Value Date    HDL 74 02/15/2025    HDL 94 03/14/2022    HDL 83 (H) 11/25/2019       Lab Results   Component Value Date    LDLCALC 106.2 02/15/2025    LDLCALC 139 (A) 03/14/2022    LDLCALC 123.6 11/25/2019       Lab Results   Component Value Date    TRIG 49 02/15/2025    TRIG 101 03/14/2022    TRIG 62 11/25/2019       Lab Results   Component Value Date    CHOLHDL 38.9 02/15/2025    CHOLHDL 37.9 11/25/2019         Review of patient's allergies indicates:   Allergen Reactions    Codeine Anaphylaxis and Nausea And Vomiting    Sulfamethoxazole-trimethoprim Hives, Itching and Rash    Diphenhydramine hcl Itching        Medication List with Changes/Refills   New Medications    AMOXICILLIN-CLAVULANATE 875-125MG (AUGMENTIN) 875-125 MG PER TABLET    Take 1 tablet by mouth every 12 (twelve) hours. for 7 days   Current Medications    BLOOD-GLUCOSE SENSOR (DEXCOM G7 SENSOR MISC)    wear daily change every 10 days for 30    BUTALBITAL-ACETAMINOPHEN-CAFFEINE -40 MG (FIORICET, ESGIC) -40 MG PER TABLET    Take 1 tablet by mouth daily as needed.    CONTOUR NEXT TEST STRIPS STRP    USE TO TEST 5 TIME PER DAY    FLUTICASONE PROPIONATE (FLONASE) 50 MCG/ACTUATION NASAL SPRAY    SHAKE LIQUID AND USE 2 SPRAYS(100 MCG) IN EACH NOSTRIL EVERY DAY    INSULIN LISPRO-AABC (LYUMJEV U-100 INSULIN) 100 UNIT/ML    INJECT 100 UNITS INTO PUMP DAILY    LEVOTHYROXINE (SYNTHROID) 88 MCG TABLET    Take 88 mcg by mouth once daily.     OMNIPOD 5 G6 INTRO KIT, GEN 5, CRTG  "   use as directed    OMNIPOD 5 G6 PODS, GEN 5, CRTG    CHANGE EACH POD EVERY 3 DAYS    ONDANSETRON (ZOFRAN-ODT) 4 MG TBDL    Place 4 mg under the tongue every 8 (eight) hours as needed.    RIZATRIPTAN (MAXALT) 10 MG TABLET    TAKE 1 TABLET BY MOUTH DAILY AS NEEDED FOR MIGRAINE. MAY REPEAT IN 2 HOURS AS NEEDED               Objective:      /78 (BP Location: Left arm, Patient Position: Sitting)   Pulse 70   Ht 5' 5" (1.651 m)   Wt 69 kg (152 lb 1.9 oz)   LMP 10/17/2003   SpO2 97%   BMI 25.31 kg/m²   Estimated body mass index is 25.31 kg/m² as calculated from the following:    Height as of this encounter: 5' 5" (1.651 m).    Weight as of this encounter: 69 kg (152 lb 1.9 oz).    Physical Exam  Vitals reviewed.   Constitutional:       Appearance: Normal appearance.   HENT:      Head: Normocephalic and atraumatic.   Eyes:      General: No scleral icterus.     Conjunctiva/sclera: Conjunctivae normal.   Cardiovascular:      Rate and Rhythm: Normal rate.   Pulmonary:      Effort: Pulmonary effort is normal. No respiratory distress.   Abdominal:      Tenderness: There is abdominal tenderness (tenderness in epigastric region and left lower quadrant). There is no right CVA tenderness or left CVA tenderness.   Musculoskeletal:      Cervical back: Normal range of motion.   Skin:     Coloration: Skin is not pale.   Neurological:      Mental Status: She is alert and oriented to person, place, and time.   Psychiatric:         Mood and Affect: Mood normal.         Behavior: Behavior normal.         Thought Content: Thought content normal.             Assessment and Plan:   1. Epigastric pain  - Amylase; Future  - CBC Auto Differential; Future  - Comprehensive Metabolic Panel; Future  - H. pylori Antibody, IgG; Future  - Lipase; Future  - C-Reactive Protein; Future  - Sedimentation rate; Future    2. Left lower quadrant pain  - CT Abdomen Pelvis With IV Contrast Routine Oral Contrast; Future  - Amylase; Future  - CBC " Auto Differential; Future  - Comprehensive Metabolic Panel; Future  - H. pylori Antibody, IgG; Future  - Lipase; Future  - C-Reactive Protein; Future  - Sedimentation rate; Future    3. Dysphagia, unspecified type    4. Type 1 diabetes mellitus with complications     Assessment & Plan    PLAN SUMMARY:   CT Abdomen with oral contrast ordered to evaluate further . Pt tolerated contrast well in the past. Pain possible but not limited to diverticulitis and possible h- pylori infection     Blood work ordered to check for H. Pylori and other possible causes.   Labs to evaluate  cause of persistent nausea and difficulty eating   Rule out other GI issues through imaging and labs    TYPE 1 DIABETES MELLITUS:   Ms. Spence is insulin dependent and wears a pump.   A1C values have been 6.8, 6.9 she reports and GFR has been stable.    Ms. Spence follows with an endocrinologist for diabetes management.    LEFT LOWER QUADRANT PAIN    Ms. Spence experiences nausea and indigestion, especially at night,while laying down.   Noted tenderness in the upper and lower abdominal areas on exam.    DYSPHAGIA:   Ms. Spence reports difficulty swallowing, especially with meat, feeling it gets stuck lower abdomen.  Will get abdominal imaging for further work up.     Epigastric pain     Ms. Spence has a history of diverticulosis and reports pain in the lower left quadrant.   Considering diverticulitis as possible diagnosis due to history and reported abdominal pain pattern.   Ordered CT Abdomen with oral contrast to evaluate for diverticulitis as well as labs to evaluate for other possible causes.     The patient was informed of the following statements     Emergency Care:Seek immediate medical attention in the emergency room if you experience any new or worsening symptoms, or if your current condition significantly changes or becomes more severe.  Patient Acknowledgment: Patient verbalizes understanding of the plan and agrees to proceed with the  recommended care.      Follow Up:  2/19/2026 Maida Alvares MD   Future Appointments   Date Time Provider Department Center   2/19/2026 11:20 AM Maida Alvares MD [facility]                     No follow-ups on file.    Other Orders Placed This Visit:  Orders Placed This Encounter   Procedures    CT Abdomen Pelvis With IV Contrast Routine Oral Contrast    Amylase    CBC Auto Differential    Comprehensive Metabolic Panel    H. pylori Antibody, IgG    Lipase    C-Reactive Protein    Sedimentation rate         This note was generated with the assistance of ambient listening technology. Verbal consent was obtained by the patient and accompanying visitor(s) for the recording of patient appointment to facilitate this note. I attest to having reviewed and edited the generated note for accuracy, though some syntax or spelling errors may persist. Please contact the author of this note for any clarification.        Louie Mohamud PA-C             [1]   Patient Active Problem List  Diagnosis    Seasonal allergic rhinitis due to pollen    Hypothyroidism due to Hashimoto's thyroiditis    Type 1 diabetes mellitus with complications    History of pancreatitis    Chronic migraine with aura without status migrainosus, not intractable    Vitamin D insufficiency

## 2025-05-23 ENCOUNTER — HOSPITAL ENCOUNTER (OUTPATIENT)
Dept: RADIOLOGY | Facility: HOSPITAL | Age: 60
Discharge: HOME | End: 2025-05-23
Payer: COMMERCIAL

## 2025-05-23 ENCOUNTER — RESULTS FOLLOW-UP (OUTPATIENT)
Dept: FAMILY MEDICINE | Facility: CLINIC | Age: 60
End: 2025-05-23

## 2025-05-23 DIAGNOSIS — R10.32 LEFT LOWER QUADRANT PAIN: ICD-10-CM

## 2025-05-23 LAB — H PYLORI IGG SERPL QL IA: NEGATIVE

## 2025-05-23 PROCEDURE — 74177 CT ABD & PELVIS W/CONTRAST: CPT | Mod: TC

## 2025-05-23 PROCEDURE — 25500020 PHARM REV CODE 255

## 2025-05-23 PROCEDURE — 74177 CT ABD & PELVIS W/CONTRAST: CPT | Mod: 26,,, | Performed by: STUDENT IN AN ORGANIZED HEALTH CARE EDUCATION/TRAINING PROGRAM

## 2025-05-23 RX ADMIN — IOHEXOL 30 ML: 350 INJECTION, SOLUTION INTRAVENOUS at 02:05

## 2025-05-23 RX ADMIN — IOHEXOL 75 ML: 350 INJECTION, SOLUTION INTRAVENOUS at 03:05

## 2025-05-26 DIAGNOSIS — R93.89 ABNORMAL FINDING ON CT SCAN: ICD-10-CM

## 2025-05-26 DIAGNOSIS — K57.92 DIVERTICULITIS: Primary | ICD-10-CM

## 2025-05-26 RX ORDER — AMOXICILLIN AND CLAVULANATE POTASSIUM 875; 125 MG/1; MG/1
1 TABLET, FILM COATED ORAL EVERY 12 HOURS
Qty: 14 TABLET | Refills: 0 | Status: SHIPPED | OUTPATIENT
Start: 2025-05-26 | End: 2025-06-02

## 2025-05-29 ENCOUNTER — HOSPITAL ENCOUNTER (OUTPATIENT)
Dept: RADIOLOGY | Facility: HOSPITAL | Age: 60
Discharge: HOME | End: 2025-05-29
Payer: COMMERCIAL

## 2025-05-29 DIAGNOSIS — R93.89 ABNORMAL FINDING ON CT SCAN: ICD-10-CM

## 2025-05-29 PROCEDURE — 93975 VASCULAR STUDY: CPT | Mod: TC

## 2025-06-03 ENCOUNTER — RESULTS FOLLOW-UP (OUTPATIENT)
Dept: FAMILY MEDICINE | Facility: CLINIC | Age: 60
End: 2025-06-03

## 2025-06-05 ENCOUNTER — PATIENT MESSAGE (OUTPATIENT)
Dept: FAMILY MEDICINE | Facility: CLINIC | Age: 60
End: 2025-06-05
Payer: COMMERCIAL

## 2025-06-09 ENCOUNTER — PATIENT MESSAGE (OUTPATIENT)
Dept: ADMINISTRATIVE | Facility: HOSPITAL | Age: 60
End: 2025-06-09
Payer: COMMERCIAL

## 2025-06-09 ENCOUNTER — OFFICE VISIT (OUTPATIENT)
Dept: GASTROENTEROLOGY | Facility: CLINIC | Age: 60
End: 2025-06-09
Payer: COMMERCIAL

## 2025-06-09 VITALS — WEIGHT: 152.13 LBS | BODY MASS INDEX: 25.31 KG/M2

## 2025-06-09 DIAGNOSIS — R10.32 LEFT LOWER QUADRANT PAIN: Primary | ICD-10-CM

## 2025-06-09 DIAGNOSIS — R13.10 DYSPHAGIA, UNSPECIFIED TYPE: ICD-10-CM

## 2025-06-09 PROCEDURE — 3008F BODY MASS INDEX DOCD: CPT | Mod: CPTII,S$GLB,,

## 2025-06-09 PROCEDURE — 99999 PR PBB SHADOW E&M-EST. PATIENT-LVL IV: CPT | Mod: PBBFAC,,,

## 2025-06-09 PROCEDURE — 99204 OFFICE O/P NEW MOD 45 MIN: CPT | Mod: S$GLB,,,

## 2025-06-09 PROCEDURE — 1159F MED LIST DOCD IN RCRD: CPT | Mod: CPTII,S$GLB,,

## 2025-06-09 NOTE — PATIENT INSTRUCTIONS
IMPORTANT INFORMATION TO KNOW BEFORE YOUR PROCEDURE    [facility] 2nd Floor     Date of procedure: T/B/D Arrive at: TB:D    If your procedure requires the administration of anesthesia, it is necessary for a responsible adult to drive you home. (Medical Transportation, Uber, Lyft, Taxi, etc. may ONLY be used if a responsible adult is present to accompany you home.  The responsible adult CAN'T be the  of the service).      person must be available to return to pick you up within 15 minutes of being notified of discharge.       Please bring a picture ID, insurance card, & copayment      Take Medications as directed below:    If you begin taking any blood thinning medications or injectable weight loss/diabetes medications (other than insulin) , please contact the endoscopy scheduling department listed below as soon as possible.    If you are diabetic see the attached instruction sheet regarding your medication.     If you take HEART, BLOOD PRESSURE, SEIZURE, PAIN, LUNG (including inhalers/nebulizers), ANTI-REJECTION (transplant patients), or PSYCHIATRIC medications, please take at your regular times with a sip of water or as directed by the scheduling nurse.     Important contact information:    Endoscopy [phone] Hours of operation Monday-Friday 8:00-4:30pm.    Questions about insurance or financial obligations call [phone] or [phone].    If you have questions regarding the prep or need to reschedule, please call [phone]. After hours questions requiring immediate assistance, contact [facility] nurse line at [phone] or [phone].   NOTE:     On occasion, unforeseen circumstances may cause a delay in your procedure start time. We respect your time and appreciate your patience during these circumstances.          Date of procedure: T/B/D with Dr. Clancy    Location of Department: [address] [place] [address]   Take the  Elevators to 2nd Floor Endoscopy Procedural Area    How to prep:    Day Before Procedure:   · You may have a light evening meal.   · No solid food after 7:00 pm.   · Continue drinking clear liquids.       Day of the Procedure:      · You may have water/clear liquids until 4 hours before your procedure or as directed by the scheduling nurse. See below for list.    What You CANNOT do:   · Do not drink milk or anything colored red.  · Do not drink alcohol.  · No gum chewing or candy morning of procedure.    Liquids That Are OK to Drink:   · Water  · Sports drinks (Gatorade, Power-Aid)  · Coffee or tea (no cream or nondairy creamer)  · Clear juices without pulp (apple, white grape)  · Gelatin desserts (no fruit or toppings)  · Clear soda (sprite, coke, ginger ale)  · Chicken broth (until 12 midnight the night before procedure

## 2025-06-09 NOTE — PROGRESS NOTES
GASTROENTEROLOGY CLINIC NOTE    Reason for visit: The primary encounter diagnosis was Left lower quadrant pain. A diagnosis of Dysphagia, unspecified type was also pertinent to this visit.  Referring provider/PCP: Maida Alvares MD    HPI:  Halle Spence is a 59 y.o. female here today for abd pain and dysphagia. She reports having LLQ abd pain for about 4 weeks- sharp and cramping, occurring intermittently. Associated nausea and loss of appetite, no vomiting, no fever. Also having diarrhea. Went to PCP who ordered CT-- demonstrated diverticulitis- tx with augmentin. Doing better today. She also has dysphagia- occurs when eating meats. Feels catch near sternum, sometimes regurgitates. She denies classic reflux symtpoms- does have hoarseness and throat clearing. HH seen previously on imaging. UTD on CRC screening.     Prior Endoscopy:  EGD:  Colon: 2024 with Dr. Clancy:   - Six 3 to 8 mm polyps in the transverse colon and in the ascending colon, removed with a cold snare.  Resected and retrieved.                - Diverticulosis in the sigmoid colon and in the transverse colon.                - The examination was otherwise normal on direct and retroflexion views.    - Repeat colonoscopy in 3 years for surveillance.     Final Pathologic Diagnosis 1. Colon, ascending, polyp x4, biopsy:  - Tubular and tubulovillous adenoma, multiple fragments.    2. Colon, transverse, polyp x2, biopsy:  - Sessile serrated lesion, single fragment.    - Tubular adenoma, single fragment.       (Portions of this note were dictated using voice recognition software and may contain dictation related errors in spelling/grammar/syntax not found on text review)    Review of Systems   Gastrointestinal:  Positive for abdominal pain (resolved).       Past Medical History: has a past medical history of Allergic rhinitis, Diabetes mellitus type 1, Hypothyroidism due to Hashimoto's thyroiditis, and Pes planus.    Past Surgical History: has a  past surgical history that includes Appendectomy; Hysterectomy; Foot surgery; Hand surgery; Oophorectomy; and Colonoscopy (N/A, 4/26/2024).    Home medications: Medications Ordered Prior to Encounter[1]    Vital signs:  Wt 69 kg (152 lb 1.9 oz)   LMP 10/17/2003   BMI 25.31 kg/m²     Physical Exam  Constitutional:       Appearance: Normal appearance. She is normal weight.   Abdominal:      General: Abdomen is flat. There is no distension.   Neurological:      Mental Status: She is alert.       I have reviewed associated labs, imaging and notes.     Assessment:  1. Left lower quadrant pain    2. Dysphagia, unspecified type      Recent CT with uncomplicated diverticulitis-- tx with augmentin. Did have colonoscopy last year-- discussed repeating vs CT- she would like to proceed with CT. New onset dysphagia and atypical reflux symptoms- schedule EGD for further eval, may require dilation.     Plan:  Orders Placed This Encounter    CT Abdomen Pelvis  Without Contrast    Case Request Endoscopy: EGD (ESOPHAGOGASTRODUODENOSCOPY)     Complete CT scan in about a month    Schedule EGD     Ling Armstrong NP  [facility]         [1]   Current Outpatient Medications on File Prior to Visit   Medication Sig Dispense Refill    blood-glucose sensor (DEXCOM G7 SENSOR MISC) wear daily change every 10 days for 30      butalbital-acetaminophen-caffeine -40 mg (FIORICET, ESGIC) -40 mg per tablet Take 1 tablet by mouth daily as needed.      CONTOUR NEXT TEST STRIPS Strp USE TO TEST 5 TIME PER DAY      fluticasone propionate (FLONASE) 50 mcg/actuation nasal spray SHAKE LIQUID AND USE 2 SPRAYS(100 MCG) IN EACH NOSTRIL EVERY DAY 16 g 0    insulin lispro-aabc (LYUMJEV U-100 INSULIN) 100 unit/mL INJECT 100 UNITS INTO PUMP DAILY      levothyroxine (SYNTHROID) 88 MCG tablet Take 88 mcg by mouth once daily.       OMNIPOD 5 G6 INTRO KIT, GEN 5, Crtg use as directed      OMNIPOD 5 G6 PODS, GEN 5, Crtg CHANGE EACH POD  EVERY 3 DAYS      ondansetron (ZOFRAN-ODT) 4 MG TbDL Place 4 mg under the tongue every 8 (eight) hours as needed.      rizatriptan (MAXALT) 10 MG tablet TAKE 1 TABLET BY MOUTH DAILY AS NEEDED FOR MIGRAINE. MAY REPEAT IN 2 HOURS AS NEEDED       No current facility-administered medications on file prior to visit.

## 2025-06-11 ENCOUNTER — TELEPHONE (OUTPATIENT)
Dept: GASTROENTEROLOGY | Facility: CLINIC | Age: 60
End: 2025-06-11
Payer: COMMERCIAL

## 2025-06-11 ENCOUNTER — PATIENT MESSAGE (OUTPATIENT)
Dept: GASTROENTEROLOGY | Facility: CLINIC | Age: 60
End: 2025-06-11
Payer: COMMERCIAL

## 2025-06-11 NOTE — TELEPHONE ENCOUNTER
Called & spoke with pt to get her scheduled for her procedure, scheduled for 7/25/2025. Verbal understanding

## 2025-06-11 NOTE — TELEPHONE ENCOUNTER
Tried calling pt to get her scheduled for her procedure, pt didn't answer left vm & sent a portal message.

## 2025-06-27 ENCOUNTER — HOSPITAL ENCOUNTER (OUTPATIENT)
Dept: RADIOLOGY | Facility: HOSPITAL | Age: 60
Discharge: HOME | End: 2025-06-27
Payer: COMMERCIAL

## 2025-06-27 DIAGNOSIS — R10.32 LEFT LOWER QUADRANT PAIN: ICD-10-CM

## 2025-06-27 PROCEDURE — 74176 CT ABD & PELVIS W/O CONTRAST: CPT | Mod: 26,,, | Performed by: RADIOLOGY

## 2025-06-27 PROCEDURE — 74176 CT ABD & PELVIS W/O CONTRAST: CPT | Mod: TC

## 2025-06-30 ENCOUNTER — RESULTS FOLLOW-UP (OUTPATIENT)
Dept: GASTROENTEROLOGY | Facility: CLINIC | Age: 60
End: 2025-06-30

## 2025-07-23 ENCOUNTER — TELEPHONE (OUTPATIENT)
Dept: ENDOSCOPY | Facility: HOSPITAL | Age: 60
End: 2025-07-23
Payer: COMMERCIAL

## 2025-07-23 NOTE — TELEPHONE ENCOUNTER
Called pt to verify date and arrival time of procedure. July 25,2025 for 0930 . Voicemail with call back number left at this time.

## 2025-07-24 ENCOUNTER — TELEPHONE (OUTPATIENT)
Dept: ENDOSCOPY | Facility: HOSPITAL | Age: 60
End: 2025-07-24
Payer: COMMERCIAL

## 2025-07-24 NOTE — TELEPHONE ENCOUNTER
Spoke with patient about arrival time @ 0930.   EGD confirmed, NPO @ 0630. Patient has insulin pump.

## 2025-07-25 ENCOUNTER — HOSPITAL ENCOUNTER (OUTPATIENT)
Facility: HOSPITAL | Age: 60
Discharge: HOME | End: 2025-07-25
Attending: INTERNAL MEDICINE | Admitting: INTERNAL MEDICINE
Payer: COMMERCIAL

## 2025-07-25 ENCOUNTER — ANESTHESIA EVENT (OUTPATIENT)
Dept: ENDOSCOPY | Facility: HOSPITAL | Age: 60
End: 2025-07-25
Payer: COMMERCIAL

## 2025-07-25 ENCOUNTER — ANESTHESIA (OUTPATIENT)
Dept: ENDOSCOPY | Facility: HOSPITAL | Age: 60
End: 2025-07-25
Payer: COMMERCIAL

## 2025-07-25 VITALS
SYSTOLIC BLOOD PRESSURE: 148 MMHG | DIASTOLIC BLOOD PRESSURE: 63 MMHG | BODY MASS INDEX: 24.16 KG/M2 | HEIGHT: 65 IN | OXYGEN SATURATION: 97 % | HEART RATE: 74 BPM | TEMPERATURE: 98 F | WEIGHT: 145 LBS | RESPIRATION RATE: 12 BRPM

## 2025-07-25 DIAGNOSIS — R13.10 DYSPHAGIA: ICD-10-CM

## 2025-07-25 LAB
POCT GLUCOSE: 157 MG/DL (ref 70–110)
POCT GLUCOSE: 162 MG/DL (ref 70–110)
POCT GLUCOSE: 163 MG/DL (ref 70–110)

## 2025-07-25 PROCEDURE — 25000003 PHARM REV CODE 250

## 2025-07-25 PROCEDURE — 43248 EGD GUIDE WIRE INSERTION: CPT | Mod: ,,, | Performed by: INTERNAL MEDICINE

## 2025-07-25 PROCEDURE — 37000009 HC ANESTHESIA EA ADD 15 MINS: Performed by: INTERNAL MEDICINE

## 2025-07-25 PROCEDURE — 43248 EGD GUIDE WIRE INSERTION: CPT | Performed by: INTERNAL MEDICINE

## 2025-07-25 PROCEDURE — 37000008 HC ANESTHESIA 1ST 15 MINUTES: Performed by: INTERNAL MEDICINE

## 2025-07-25 PROCEDURE — 63600175 PHARM REV CODE 636 W HCPCS

## 2025-07-25 RX ORDER — SODIUM CHLORIDE 9 MG/ML
INJECTION, SOLUTION INTRAVENOUS CONTINUOUS
Status: DISCONTINUED | OUTPATIENT
Start: 2025-07-25 | End: 2025-07-25 | Stop reason: HOSPADM

## 2025-07-25 RX ORDER — LIDOCAINE HYDROCHLORIDE 20 MG/ML
INJECTION INTRAVENOUS
Status: DISCONTINUED | OUTPATIENT
Start: 2025-07-25 | End: 2025-07-25

## 2025-07-25 RX ORDER — PANTOPRAZOLE SODIUM 40 MG/1
40 TABLET, DELAYED RELEASE ORAL DAILY
Qty: 30 TABLET | Refills: 6 | Status: SHIPPED | OUTPATIENT
Start: 2025-07-25 | End: 2026-07-25

## 2025-07-25 RX ORDER — SODIUM CHLORIDE 0.9 % (FLUSH) 0.9 %
20 SYRINGE (ML) INJECTION ONCE
Status: DISCONTINUED | OUTPATIENT
Start: 2025-07-25 | End: 2025-07-25 | Stop reason: HOSPADM

## 2025-07-25 RX ORDER — PROPOFOL 10 MG/ML
VIAL (ML) INTRAVENOUS
Status: DISCONTINUED | OUTPATIENT
Start: 2025-07-25 | End: 2025-07-25

## 2025-07-25 RX ORDER — PROPOFOL 10 MG/ML
VIAL (ML) INTRAVENOUS CONTINUOUS PRN
Status: DISCONTINUED | OUTPATIENT
Start: 2025-07-25 | End: 2025-07-25

## 2025-07-25 RX ADMIN — LIDOCAINE HYDROCHLORIDE 100 MG: 20 INJECTION, SOLUTION INTRAVENOUS at 12:07

## 2025-07-25 RX ADMIN — GLYCOPYRROLATE 0.1 MG: 0.2 INJECTION, SOLUTION INTRAMUSCULAR; INTRAVITREAL at 11:07

## 2025-07-25 RX ADMIN — PROPOFOL 125 MCG/KG/MIN: 10 INJECTION, EMULSION INTRAVENOUS at 12:07

## 2025-07-25 RX ADMIN — PROPOFOL 70 MG: 10 INJECTION, EMULSION INTRAVENOUS at 12:07

## 2025-07-25 RX ADMIN — SODIUM CHLORIDE: 0.9 INJECTION, SOLUTION INTRAVENOUS at 11:07

## 2025-07-25 NOTE — TRANSFER OF CARE
"Anesthesia Transfer of Care Note    Patient: Halle Spence    Procedure(s) Performed: Procedure(s) (LRB):  EGD (ESOPHAGOGASTRODUODENOSCOPY) (N/A)    Patient location: GI    Anesthesia Type: general    Transport from OR: Transported from OR on room air with adequate spontaneous ventilation    Post pain: adequate analgesia    Post assessment: no apparent anesthetic complications and tolerated procedure well    Post vital signs: stable    Level of consciousness: responds to stimulation and awake    Nausea/Vomiting: no nausea/vomiting    Complications: none    Transfer of care protocol was followed      Last vitals: Visit Vitals  /84   Pulse 71   Temp 36.6 °C (97.9 °F)   Resp 16   Ht 5' 5" (1.651 m)   Wt 65.8 kg (145 lb)   LMP 10/17/2003   SpO2 98%   Breastfeeding No   BMI 24.13 kg/m²     "

## 2025-07-25 NOTE — PROVATION PATIENT INSTRUCTIONS
Discharge Summary/Instructions after an Endoscopic Procedure  Patient Name: Halle Spence  Patient MRN: [id number]  Patient YOB: 1965 Friday, July 25, 2025  Simon Clancy MD  Dear patient,  As a result of recent federal legislation (The Federal Cures Act), you may   receive lab or pathology results from your procedure in your [facility]   account before your physician is able to contact you. Your physician or   their representative will relay the results to you with their   recommendations at their soonest availability.  Thank you,  Your health is very important to us during the Covid Crisis. Following your   procedure today, you will receive a daily text for 2 weeks asking about   signs or symptoms of Covid 19.  Please respond to this text when you   receive it so we can follow up and keep you as safe as possible.   RESTRICTIONS:  During your procedure today, you received medications for sedation.  These   medications may affect your judgment, balance and coordination.  Therefore,   for 24 hours, you have the following restrictions:   - DO NOT drive a car, operate machinery, make legal/financial decisions,   sign important papers or drink alcohol.    ACTIVITY:  Today: no heavy lifting, straining or running due to procedural   sedation/anesthesia.  The following day: return to full activity including work.  DIET:  Eat and drink normally unless instructed otherwise.     TREATMENT FOR COMMON SIDE EFFECTS:  - Mild abdominal pain, nausea, belching, bloating or excessive gas:  rest,   eat lightly and use a heating pad.  - Sore Throat: treat with throat lozenges and/or gargle with warm salt   water.  - Because air was used during the procedure, expelling large amounts of air   from your rectum or belching is normal.  - If a bowel prep was taken, you may not have a bowel movement for 1-3 days.    This is normal.  SYMPTOMS TO WATCH FOR AND REPORT TO YOUR PHYSICIAN:  1. Abdominal pain or bloating, other than gas  cramps.  2. Chest pain.  3. Back pain.  4. Signs of infection such as: chills or fever occurring within 24 hours   after the procedure.  5. Rectal bleeding, which would show as bright red, maroon, or black stools.   (A tablespoon of blood from the rectum is not serious, especially if   hemorrhoids are present.)  6. Vomiting.  7. Weakness or dizziness.  GO DIRECTLY TO THE NEAREST EMERGENCY ROOM IF YOU HAVE ANY OF THE FOLLOWING:      Difficulty breathing              Chills and/or fever over 101 F   Persistent vomiting and/or vomiting blood   Severe abdominal pain   Severe chest pain   Black, tarry stools   Bleeding- more than one tablespoon   Any other symptom or condition that you feel may need urgent attention  Your doctor recommends these additional instructions:  If any biopsies were taken, your doctors clinic will contact you in 1 to 2   weeks with any results.  - Discharge patient to home.   - Patient has a contact number available for emergencies.  The signs and   symptoms of potential delayed complications were discussed with the   patient.  Return to normal activities tomorrow.  Written discharge   instructions were provided to the patient.   - Resume previous diet.   - Continue present medications.   - Refer to surgeon for consideration of hiatal hernia repair.  For questions, problems or results please call your physician - Simon Clancy MD.  EMERGENCY PHONE NUMBER: [phone],  LAB RESULTS: [phone]  IF A COMPLICATION OR EMERGENCY SITUATION ARISES AND YOU ARE UNABLE TO REACH   YOUR PHYSICIAN - GO DIRECTLY TO THE EMERGENCY ROOM.  Simon Clancy MD  7/25/2025 12:13:48 PM  This report has been verified and signed electronically.  Dear patient,  As a result of recent federal legislation (The Federal Cures Act), you may   receive lab or pathology results from your procedure in your [facility]   account before your physician is able to contact you. Your physician or   their representative will  relay the results to you with their   recommendations at their soonest availability.  Thank you,  PROVATION

## 2025-07-25 NOTE — ANESTHESIA PREPROCEDURE EVALUATION
[facility]  Anesthesia Pre-Operative Evaluation         Patient Name: Halle Spence  YOB: 1965  MRN: [id number]    SUBJECTIVE:     Pre-operative evaluation for Procedure(s) (LRB):  EGD (ESOPHAGOGASTRODUODENOSCOPY) (N/A)     07/25/2025    Halle Spence is a 59 y.o. female w/ a significant PMHx of .    Patient now presents for the above procedure(s).      LDA: None documented.       Subcutaneous Infusion Pump Abdomen (Comment) (Active)   Number of days:        Prev airway: None documented.    Drips: None documented.      Problem List[1]    Review of patient's allergies indicates:   Allergen Reactions    Codeine Anaphylaxis and Nausea And Vomiting    Sulfamethoxazole-trimethoprim Hives, Itching and Rash    Diphenhydramine hcl Itching       Current Inpatient Medications:      Medications Ordered Prior to Encounter[2]    Past Surgical History:   Procedure Laterality Date    APPENDECTOMY      COLONOSCOPY N/A 4/26/2024    Procedure: COLONOSCOPY;  Surgeon: Simon Clancy MD;  Location: [facility];  Service: Endoscopy;  Laterality: N/A;  4/19-precall complete-MS    FOOT SURGERY      HAND SURGERY      HYSTERECTOMY      OOPHORECTOMY         Social History[3]    OBJECTIVE:     Vital Signs Range (Last 24H):         Significant Labs:  Lab Results   Component Value Date    WBC 5.74 05/22/2025    HGB 12.8 05/22/2025    HCT 39.2 05/22/2025     05/22/2025    CHOL 190 02/15/2025    TRIG 49 02/15/2025    HDL 74 02/15/2025    ALT 35 05/22/2025    AST 34 05/22/2025     05/22/2025    K 3.9 05/22/2025     05/22/2025    CREATININE 0.8 05/22/2025    BUN 14 05/22/2025    CO2 25 05/22/2025    TSH 0.780 02/15/2025    INR 0.9 03/28/2020    HGBA1C 6.9 (A) 10/22/2024       Diagnostic Studies: No relevant studies.    EKG:   Results for orders placed or performed in visit on 02/18/25   IN OFFICE EKG 12-LEAD (to Edgar)    Collection Time: 02/18/25 11:57 AM   Result Value Ref Range     QRS Duration 70 ms    [facility] QTC Calculation 435 ms    Narrative    Test Reason : Z00.01,E10.8,    Vent. Rate :  67 BPM     Atrial Rate :  67 BPM     P-R Int : 156 ms          QRS Dur :  70 ms      QT Int : 412 ms       P-R-T Axes :  26  52  49 degrees    QTcB Int : 435 ms    Normal sinus rhythm  Cannot rule out Anterior infarct ,age undetermined  Abnormal ECG  When compared with ECG of 29-Mar-2020 18:23,  QT has shortened  Confirmed by Darian Albrecht ([id number]) on 2/19/2025 9:37:24 PM    Referred By:            Confirmed By: Darian Albrecht       2D ECHO:  TTE:  No results found for this or any previous visit.    GRIS:  No results found for this or any previous visit.    ASSESSMENT/PLAN:           Pre-op Assessment    I have reviewed the Patient Summary Reports.    I have reviewed the NPO Status.   I have reviewed the Medications.     Review of Systems  Anesthesia Hx:   Neg history of prior surgery.          Denies Family Hx of Anesthesia complications.    Denies Personal Hx of Anesthesia complications.                    Hematology/Oncology:  Hematology Normal   Oncology Normal                                   EENT/Dental:  EENT/Dental Normal           Cardiovascular:  Cardiovascular Normal                                              Pulmonary:  Pulmonary Normal                       Renal/:  Renal/ Normal                 Hepatic/GI:  Hepatic/GI Normal                    Musculoskeletal:  Musculoskeletal Normal                Neurological:  Neurology Normal                                      Endocrine:  Diabetes, type 2 Hypothyroidism          Dermatological:  Skin Normal    Psych:  Psychiatric Normal                    Physical Exam  General: Well nourished    Airway:  Mallampati: II   Mouth Opening: Normal  Neck ROM: Normal ROM        Anesthesia Plan  Type of Anesthesia, risks & benefits discussed:    Anesthesia Type: Gen Natural Airway  Intra-op Monitoring Plan: Standard ASA Monitors  Induction:   IV  Informed Consent: Patient consented to blood products? No  ASA Score: 2  Day of Surgery Review of History & Physical: H&P Update referred to the surgeon/provider.    Ready For Surgery From Anesthesia Perspective.     .           [1]   Patient Active Problem List  Diagnosis    Seasonal allergic rhinitis due to pollen    Hypothyroidism due to Hashimoto's thyroiditis    Type 1 diabetes mellitus with complications    History of pancreatitis    Chronic migraine with aura without status migrainosus, not intractable    Vitamin D insufficiency   [2]   No current facility-administered medications on file prior to encounter.     Current Outpatient Medications on File Prior to Encounter   Medication Sig Dispense Refill    blood-glucose sensor (DEXCOM G7 SENSOR MISC) wear daily change every 10 days for 30      butalbital-acetaminophen-caffeine -40 mg (FIORICET, ESGIC) -40 mg per tablet Take 1 tablet by mouth daily as needed.      CONTOUR NEXT TEST STRIPS Strp USE TO TEST 5 TIME PER DAY      fluticasone propionate (FLONASE) 50 mcg/actuation nasal spray SHAKE LIQUID AND USE 2 SPRAYS(100 MCG) IN EACH NOSTRIL EVERY DAY 16 g 0    insulin lispro-aabc (LYUMJEV U-100 INSULIN) 100 unit/mL INJECT 100 UNITS INTO PUMP DAILY      levothyroxine (SYNTHROID) 88 MCG tablet Take 88 mcg by mouth once daily.       OMNIPOD 5 G6 INTRO KIT, GEN 5, Crtg use as directed      OMNIPOD 5 G6 PODS, GEN 5, Crtg CHANGE EACH POD EVERY 3 DAYS      ondansetron (ZOFRAN-ODT) 4 MG TbDL Place 4 mg under the tongue every 8 (eight) hours as needed.      rizatriptan (MAXALT) 10 MG tablet TAKE 1 TABLET BY MOUTH DAILY AS NEEDED FOR MIGRAINE. MAY REPEAT IN 2 HOURS AS NEEDED     [3]   Social History  Socioeconomic History    Marital status:    Tobacco Use    Smoking status: Never    Smokeless tobacco: Never   Substance and Sexual Activity    Alcohol use: Yes     Comment: occasional wine     Social Drivers of Health     Financial Resource Strain: Low  Risk  (12/19/2024)    Overall Financial Resource Strain (CARDIA)     Difficulty of Paying Living Expenses: Not hard at all   Food Insecurity: No Food Insecurity (12/19/2024)    Hunger Vital Sign     Worried About Running Out of Food in the Last Year: Never true     Ran Out of Food in the Last Year: Never true   Transportation Needs: No Transportation Needs (11/24/2019)    PRAPARE - Transportation     Lack of Transportation (Medical): No     Lack of Transportation (Non-Medical): No   Physical Activity: Sufficiently Active (12/19/2024)    Exercise Vital Sign     Days of Exercise per Week: 5 days     Minutes of Exercise per Session: 30 min   Stress: No Stress Concern Present (12/19/2024)    Citizen of [place] [employer] - Occupational Stress Questionnaire     Feeling of Stress : Only a little   Housing Stability: Unknown (12/19/2024)    Housing Stability Vital Sign     Unable to Pay for Housing in the Last Year: No

## 2025-07-25 NOTE — H&P
Short Stay Endoscopy History and Physical    PCP - Maida Alvares MD     Procedure - EGD  ASA - per anesthesia  Mallampati - per anesthesia  History of Anesthesia problems - no  Family history Anesthesia problems -  no   Plan of anesthesia - General    HPI:  This is a 59 y.o. female here for evaluation of :     dysphagia      ROS:  Constitutional: No fevers, chills, No weight loss  CV: No chest pain  Pulm: No cough, No shortness of breath  Ophtho: No vision changes  GI: see HPI  Derm: No rash    Medical History:  has a past medical history of Allergic rhinitis, Diabetes mellitus type 1, Hypothyroidism due to Hashimoto's thyroiditis, and Pes planus.    Surgical History:  has a past surgical history that includes Appendectomy; Hysterectomy; Foot surgery; Hand surgery; Oophorectomy; and Colonoscopy (N/A, 4/26/2024).    Family History: family history includes Atrial fibrillation in her mother; Diabetes in her father, maternal grandfather, and paternal grandfather; Heart disease in her mother.. Otherwise no colon cancer, inflammatory bowel disease, or GI malignancies.    Social History:  reports that she has never smoked. She has never used smokeless tobacco. She reports current alcohol use.    Review of patient's allergies indicates:   Allergen Reactions    Codeine Anaphylaxis and Nausea And Vomiting    Sulfamethoxazole-trimethoprim Hives, Itching and Rash    Diphenhydramine hcl Itching    Opioids - morphine analogues Palpitations       Medications:   Prescriptions Prior to Admission[1]    Physical Exam:    Vital Signs:   Vitals:    07/25/25 0940   BP: 137/84   Pulse: 71   Resp: 16   Temp: 97.9 °F (36.6 °C)       General Appearance: Well appearing in no acute distress  Eyes:    No scleral icterus  ENT: Neck supple, Lips, mucosa, and tongue normal; teeth and gums normal  Abdomen: Soft, non tender, non distended with normal bowel sounds. No hepatosplenomegaly, ascites, or mass.  Extremities: No edema  Skin: No  rash    Labs:  Lab Results   Component Value Date    WBC 5.74 05/22/2025    HGB 12.8 05/22/2025    HCT 39.2 05/22/2025     05/22/2025    CHOL 190 02/15/2025    TRIG 49 02/15/2025    HDL 74 02/15/2025    ALT 35 05/22/2025    AST 34 05/22/2025     05/22/2025    K 3.9 05/22/2025     05/22/2025    CREATININE 0.8 05/22/2025    BUN 14 05/22/2025    CO2 25 05/22/2025    TSH 0.780 02/15/2025    INR 0.9 03/28/2020    HGBA1C 6.9 (A) 10/22/2024       I have explained the risks and benefits of endoscopy procedures to the patient including but not limited to bleeding, perforation, infection, and death.  The patient was asked if they understand and allowed to ask any further questions to their satisfaction.      Simon Clancy MD         [1]   Medications Prior to Admission   Medication Sig Dispense Refill Last Dose/Taking    levothyroxine (SYNTHROID) 88 MCG tablet Take 88 mcg by mouth once daily.    7/24/2025    blood-glucose sensor (DEXCOM G7 SENSOR MISC) wear daily change every 10 days for 30       butalbital-acetaminophen-caffeine -40 mg (FIORICET, ESGIC) -40 mg per tablet Take 1 tablet by mouth daily as needed.       CONTOUR NEXT TEST STRIPS Strp USE TO TEST 5 TIME PER DAY       fluticasone propionate (FLONASE) 50 mcg/actuation nasal spray SHAKE LIQUID AND USE 2 SPRAYS(100 MCG) IN EACH NOSTRIL EVERY DAY 16 g 0     insulin lispro-aabc (LYUMJEV U-100 INSULIN) 100 unit/mL INJECT 100 UNITS INTO PUMP DAILY       OMNIPOD 5 G6 INTRO KIT, GEN 5, Crtg use as directed       OMNIPOD 5 G6 PODS, GEN 5, Crtg CHANGE EACH POD EVERY 3 DAYS       ondansetron (ZOFRAN-ODT) 4 MG TbDL Place 4 mg under the tongue every 8 (eight) hours as needed.       rizatriptan (MAXALT) 10 MG tablet TAKE 1 TABLET BY MOUTH DAILY AS NEEDED FOR MIGRAINE. MAY REPEAT IN 2 HOURS AS NEEDED

## 2025-08-04 NOTE — ANESTHESIA POSTPROCEDURE EVALUATION
Anesthesia Post Evaluation    Patient: Halle Spence    Procedure(s) Performed: Procedure(s) (LRB):  EGD (ESOPHAGOGASTRODUODENOSCOPY) (N/A)    Final Anesthesia Type: general      Patient location during evaluation: PACU  Patient participation: Yes- Able to Participate  Level of consciousness: awake  Post-procedure vital signs: reviewed and stable  Pain management: adequate  Airway patency: patent    PONV status at discharge: No PONV  Anesthetic complications: no      Cardiovascular status: blood pressure returned to baseline  Respiratory status: unassisted  Hydration status: euvolemic  Follow-up not needed.              Vitals Value Taken Time   /63 07/25/25 12:45   Temp  08/04/25 07:53   Pulse 74 07/25/25 12:45   Resp 12 07/25/25 12:45   SpO2 97 % 07/25/25 12:45         No case tracking events are documented in the log.      Pain/Nette Score: No data recorded